# Patient Record
Sex: FEMALE | Race: WHITE | Employment: OTHER | URBAN - METROPOLITAN AREA
[De-identification: names, ages, dates, MRNs, and addresses within clinical notes are randomized per-mention and may not be internally consistent; named-entity substitution may affect disease eponyms.]

---

## 2017-01-27 PROBLEM — I77.9 BILATERAL CAROTID ARTERY DISEASE: Status: ACTIVE | Noted: 2017-01-27

## 2017-01-27 PROBLEM — I77.9 BILATERAL CAROTID ARTERY DISEASE (HCC): Status: ACTIVE | Noted: 2017-01-27

## 2017-04-03 ENCOUNTER — HOSPITAL ENCOUNTER (OUTPATIENT)
Dept: CARDIOLOGY CLINIC | Facility: HOSPITAL | Age: 77
Discharge: HOME | End: 2017-04-03
Attending: INTERNAL MEDICINE

## 2017-04-03 DIAGNOSIS — I65.23 BILATERAL CAROTID ARTERY STENOSIS: ICD-10-CM

## 2017-04-07 ENCOUNTER — HOSPITAL ENCOUNTER (OUTPATIENT)
Dept: GENERAL RADIOLOGY | Facility: HOSPITAL | Age: 77
Discharge: HOME | End: 2017-04-07
Attending: INTERNAL MEDICINE
Payer: MEDICARE

## 2017-04-07 DIAGNOSIS — I25.10 CAD (CORONARY ARTERY DISEASE): ICD-10-CM

## 2017-04-07 PROCEDURE — 71020 XR CHEST PA + LAT CHEST (CPT=71020): CPT

## 2017-04-10 RX ORDER — FLUOXETINE HYDROCHLORIDE 20 MG/1
20 CAPSULE ORAL DAILY
COMMUNITY
End: 2018-07-16

## 2017-04-10 RX ORDER — AZELASTINE HYDROCHLORIDE 0.5 MG/ML
1 SOLUTION/ DROPS OPHTHALMIC 2 TIMES DAILY PRN
Status: ON HOLD | COMMUNITY
End: 2017-09-26

## 2017-04-10 NOTE — H&P
Eduardodharmesh Wendi  : 1940  ACCOUNT:  [id number]  [phone]  PCP: Dr. Katelyn Mccann    TODAY'S DATE: 2017  DICTATED BY:  CRISSY Richmond]    CHIEF COMPLAINT: [elevated BP, Followup of .  CAD, of native vessels, Followup of Carotid artery st GI: denies melena, hematochezia. : no hematuria. INTEG: no new rashes, normal skin texture and color. MS: no limiting arthritis. NEURO: lightheaded, dizziness and w/out orthostasis. HEM/LYMPH: easy bruising. ALL: no new allergies.     PAST HISTORY: lioes adequate gait for exercise testing/testing. EXT: no clubbing or cyanosis. SKIN: ICD incision well healed. NEURO/PSYCH: alert, oriented to time, place and person and normal affect. CV: PALP: no lifts, thrills or rub and PMI not displaced.  CAROTIDS: car 75MG      ONE TABLET BY MOUTH ONE TIME DAILY  12/15/16 *Sotalol HCl          80MG      ONE TABLET BY MOUTH TWICE A DAY AS D  07/29/16 *Zetia                10MG      ONE TABLET BY MOUTH   NIGHTLY AT BED  04/28/16 *Lovastatin           40MG      2 TABLET BY

## 2017-04-11 ENCOUNTER — HOSPITAL ENCOUNTER (OUTPATIENT)
Dept: INTERVENTIONAL RADIOLOGY/VASCULAR | Facility: HOSPITAL | Age: 77
Discharge: HOME | End: 2017-04-12
Attending: INTERNAL MEDICINE | Admitting: INTERNAL MEDICINE
Payer: MEDICARE

## 2017-04-11 DIAGNOSIS — R93.1 ABNORMAL NUCLEAR CARDIAC IMAGING TEST: ICD-10-CM

## 2017-04-11 PROCEDURE — B3121ZZ FLUOROSCOPY OF LEFT SUBCLAVIAN ARTERY USING LOW OSMOLAR CONTRAST: ICD-10-PCS | Performed by: INTERNAL MEDICINE

## 2017-04-11 PROCEDURE — 027034Z DILATION OF CORONARY ARTERY, ONE ARTERY WITH DRUG-ELUTING INTRALUMINAL DEVICE, PERCUTANEOUS APPROACH: ICD-10-PCS | Performed by: INTERNAL MEDICINE

## 2017-04-11 PROCEDURE — B240ZZ3 ULTRASONOGRAPHY OF SINGLE CORONARY ARTERY, INTRAVASCULAR: ICD-10-PCS | Performed by: INTERNAL MEDICINE

## 2017-04-11 PROCEDURE — 3E033PZ INTRODUCTION OF PLATELET INHIBITOR INTO PERIPHERAL VEIN, PERCUTANEOUS APPROACH: ICD-10-PCS | Performed by: INTERNAL MEDICINE

## 2017-04-11 PROCEDURE — 85347 COAGULATION TIME ACTIVATED: CPT

## 2017-04-11 PROCEDURE — 93459 L HRT ART/GRFT ANGIO: CPT

## 2017-04-11 PROCEDURE — 4A023N7 MEASUREMENT OF CARDIAC SAMPLING AND PRESSURE, LEFT HEART, PERCUTANEOUS APPROACH: ICD-10-PCS | Performed by: INTERNAL MEDICINE

## 2017-04-11 PROCEDURE — 93010 ELECTROCARDIOGRAM REPORT: CPT | Performed by: INTERNAL MEDICINE

## 2017-04-11 PROCEDURE — 92978 ENDOLUMINL IVUS OCT C 1ST: CPT

## 2017-04-11 PROCEDURE — 93005 ELECTROCARDIOGRAM TRACING: CPT

## 2017-04-11 PROCEDURE — B2151ZZ FLUOROSCOPY OF LEFT HEART USING LOW OSMOLAR CONTRAST: ICD-10-PCS | Performed by: INTERNAL MEDICINE

## 2017-04-11 PROCEDURE — B2121ZZ FLUOROSCOPY OF SINGLE CORONARY ARTERY BYPASS GRAFT USING LOW OSMOLAR CONTRAST: ICD-10-PCS | Performed by: INTERNAL MEDICINE

## 2017-04-11 PROCEDURE — B2111ZZ FLUOROSCOPY OF MULTIPLE CORONARY ARTERIES USING LOW OSMOLAR CONTRAST: ICD-10-PCS | Performed by: INTERNAL MEDICINE

## 2017-04-11 RX ORDER — ASPIRIN 81 MG/1
324 TABLET, CHEWABLE ORAL ONCE
Status: DISCONTINUED | OUTPATIENT
Start: 2017-04-11 | End: 2017-04-11 | Stop reason: HOSPADM

## 2017-04-11 RX ORDER — HEPARIN SODIUM 5000 [USP'U]/ML
INJECTION, SOLUTION INTRAVENOUS; SUBCUTANEOUS
Status: COMPLETED
Start: 2017-04-11 | End: 2017-04-11

## 2017-04-11 RX ORDER — FLUOXETINE HYDROCHLORIDE 20 MG/1
20 CAPSULE ORAL DAILY
Status: DISCONTINUED | OUTPATIENT
Start: 2017-04-12 | End: 2017-04-12

## 2017-04-11 RX ORDER — SODIUM CHLORIDE 9 MG/ML
INJECTION, SOLUTION INTRAVENOUS CONTINUOUS
Status: ACTIVE | OUTPATIENT
Start: 2017-04-11 | End: 2017-04-11

## 2017-04-11 RX ORDER — KETOTIFEN FUMARATE 0.35 MG/ML
1 SOLUTION/ DROPS OPHTHALMIC 2 TIMES DAILY
Status: DISCONTINUED | OUTPATIENT
Start: 2017-04-11 | End: 2017-04-12

## 2017-04-11 RX ORDER — SOTALOL HYDROCHLORIDE 80 MG/1
80 TABLET ORAL
Status: DISCONTINUED | OUTPATIENT
Start: 2017-04-11 | End: 2017-04-12

## 2017-04-11 RX ORDER — SODIUM CHLORIDE 9 MG/ML
INJECTION, SOLUTION INTRAVENOUS CONTINUOUS
Status: DISCONTINUED | OUTPATIENT
Start: 2017-04-11 | End: 2017-04-12

## 2017-04-11 RX ORDER — LIDOCAINE HYDROCHLORIDE 10 MG/ML
INJECTION, SOLUTION INFILTRATION; PERINEURAL
Status: COMPLETED
Start: 2017-04-11 | End: 2017-04-11

## 2017-04-11 RX ORDER — CLOPIDOGREL BISULFATE 75 MG/1
75 TABLET ORAL DAILY
Status: DISCONTINUED | OUTPATIENT
Start: 2017-04-11 | End: 2017-04-11

## 2017-04-11 RX ORDER — MIDAZOLAM HYDROCHLORIDE 1 MG/ML
INJECTION INTRAMUSCULAR; INTRAVENOUS
Status: COMPLETED
Start: 2017-04-11 | End: 2017-04-11

## 2017-04-11 RX ORDER — ASPIRIN 81 MG/1
81 TABLET ORAL DAILY
Status: DISCONTINUED | OUTPATIENT
Start: 2017-04-12 | End: 2017-04-12

## 2017-04-11 RX ORDER — CLOPIDOGREL BISULFATE 75 MG/1
75 TABLET ORAL DAILY
Status: DISCONTINUED | OUTPATIENT
Start: 2017-04-12 | End: 2017-04-12

## 2017-04-11 RX ORDER — LOVASTATIN 40 MG/1
80 TABLET ORAL NIGHTLY
Status: DISCONTINUED | OUTPATIENT
Start: 2017-04-11 | End: 2017-04-12

## 2017-04-11 RX ADMIN — SODIUM CHLORIDE: 9 INJECTION, SOLUTION INTRAVENOUS at 16:45:00

## 2017-04-11 RX ADMIN — SODIUM CHLORIDE: 9 INJECTION, SOLUTION INTRAVENOUS at 14:30:00

## 2017-04-11 RX ADMIN — SOTALOL HYDROCHLORIDE 80 MG: 80 TABLET ORAL at 22:32:00

## 2017-04-11 RX ADMIN — LOVASTATIN 80 MG: 40 TABLET ORAL at 22:33:00

## 2017-04-11 RX ADMIN — KETOTIFEN FUMARATE 1 DROP: 0.35 SOLUTION/ DROPS OPHTHALMIC at 21:00:00

## 2017-04-11 NOTE — H&P
History & Physical Examination    Patient Name: Darline Han  MRN: [id number]  CSN: [id number]  YOB: 1940    Diagnosis: CAD,s/p CABG, recurrent chest pain    Present Illness: Cath      Prescriptions prior to admission:  FLUoxetine HCl 20 diet. Check blood sugars three times a week, alternating am and 2 hours post prandial and prn. Disp: 50 each Rfl: 12 Unknown at Unknown time   LANCETS MICRO THIN 33G Does not apply Misc 1 Device by Does not apply route daily.  Disp: 50 each Rfl: 0 Unknown a Comment Medtronic ICD, intermittently dependent    CABG  1996    Comment lima lad/svg to om svg to rca    ANGIOGRAM      ANGIOPLASTY (CORONARY)      CATH PERCUTANEOUS  TRANSLUMINAL CORONARY ANGIOPLASTY      Comment x2 stents Feb/2016    ARTHROSCOPY OF

## 2017-04-11 NOTE — PROCEDURES
[facility]  PCI Procedure Note    Jesus Alvarado Location: Cath Lab    CSN [id number] MRN [id number]   Admission Date 4/11/2017 Procedure Date 4/11/2017   Attending Physician Yusuf Castillo MD Procedure Physician Anna Jackson MD     Pre-Proced artery. The lesion was crossed with a 0.014 run through. A series of 3.5 mm balloons were used to try to expand the previously stented segment. The lesion appeared to be quite fibrotic. A 3.5 x 26 mm resolute drug-eluting stent was deployed.   It was po described above  2. Three-vessel coronary disease  3. Patent stents in the right coronary artery  4. Patent LIMA to the LAD  5. Patent left subclavian artery stent  6.   Severe in-stent restenosis in the saphenous vein graft to the obtuse marginal arter

## 2017-04-11 NOTE — PLAN OF CARE
NURSING ADMISSION NOTE      Pt admit from cath lab w/ daughter at bedside. Pt A&O, denies pain at present. R groin w/ sheath in place- ACT to be checked @ 1830. Pt updated on POC. Will monitor. 1830: Act 224- to be re-checked @ 1930.

## 2017-04-12 VITALS
BODY MASS INDEX: 31.15 KG/M2 | WEIGHT: 165 LBS | OXYGEN SATURATION: 98 % | SYSTOLIC BLOOD PRESSURE: 136 MMHG | HEART RATE: 72 BPM | RESPIRATION RATE: 20 BRPM | TEMPERATURE: 98 F | DIASTOLIC BLOOD PRESSURE: 50 MMHG | HEIGHT: 61 IN

## 2017-04-12 PROCEDURE — 80048 BASIC METABOLIC PNL TOTAL CA: CPT | Performed by: INTERNAL MEDICINE

## 2017-04-12 PROCEDURE — 85027 COMPLETE CBC AUTOMATED: CPT | Performed by: INTERNAL MEDICINE

## 2017-04-12 PROCEDURE — 99211 OFF/OP EST MAY X REQ PHY/QHP: CPT

## 2017-04-12 RX ADMIN — ASPIRIN 81 MG: 81 TABLET ORAL at 09:37:00

## 2017-04-12 RX ADMIN — FLUOXETINE HYDROCHLORIDE 20 MG: 20 CAPSULE ORAL at 09:37:00

## 2017-04-12 RX ADMIN — SOTALOL HYDROCHLORIDE 80 MG: 80 TABLET ORAL at 05:47:00

## 2017-04-12 RX ADMIN — KETOTIFEN FUMARATE 1 DROP: 0.35 SOLUTION/ DROPS OPHTHALMIC at 09:37:00

## 2017-04-12 RX ADMIN — CLOPIDOGREL BISULFATE 75 MG: 75 TABLET ORAL at 09:37:00

## 2017-04-12 NOTE — PROGRESS NOTES
[facility]  Cardiology Progress Note    Rock Cool Patient Status:  Outpatient in a Bed    3/11/1940 MRN [id number]   [facility] 8NE-A Attending Rory Butts MD   Hosp Day # 1 PCP Rafaela Cordero MD     Subjective:  No being seen by Dr. Raymond Mahan.      CRISSY Gao  4/12/2017  10:04 AM  Pt seen and examined, OK for d/c  Maxine Escalante

## 2017-04-12 NOTE — PLAN OF CARE
Patient is alert and oriented. Saturates well on room, A-paced on the monitor. Denies any pain or SOB. Sheath pulled at 2102, manual pressure held for 25 mins. Quick clot was applied with a Tegaderm. Pedal pulses with doppler only.  Dressing is C/D/I, sit

## 2017-04-12 NOTE — DIETARY NOTE
Consult received per cardiac rehab standing order. Patient to be educated by Cardiac Rehab staff and encouraged to attend outpatient class, taught by RD. RD available for questions PRN.     Bernice Lutz, [address], [place], [address] [facility]  Pager [phone] [place] [phone]

## 2017-05-22 ENCOUNTER — LAB ENCOUNTER (OUTPATIENT)
Dept: LAB | Facility: HOSPITAL | Age: 77
End: 2017-05-22
Attending: INTERNAL MEDICINE
Payer: MEDICARE

## 2017-05-22 DIAGNOSIS — I25.10 CORONARY ATHEROSCLEROSIS OF NATIVE CORONARY ARTERY: Primary | ICD-10-CM

## 2017-05-22 PROCEDURE — 80048 BASIC METABOLIC PNL TOTAL CA: CPT

## 2017-05-22 PROCEDURE — 36415 COLL VENOUS BLD VENIPUNCTURE: CPT

## 2017-09-25 ENCOUNTER — HOSPITAL ENCOUNTER (OUTPATIENT)
Dept: GENERAL RADIOLOGY | Facility: HOSPITAL | Age: 77
Discharge: HOME | End: 2017-09-25
Attending: INTERNAL MEDICINE
Payer: MEDICARE

## 2017-09-25 ENCOUNTER — LAB ENCOUNTER (OUTPATIENT)
Dept: LAB | Facility: HOSPITAL | Age: 77
End: 2017-09-25
Attending: INTERNAL MEDICINE
Payer: MEDICARE

## 2017-09-25 DIAGNOSIS — R07.9 CHEST PAIN: ICD-10-CM

## 2017-09-25 DIAGNOSIS — I25.10 CORONARY ATHEROSCLEROSIS OF NATIVE CORONARY ARTERY: Primary | ICD-10-CM

## 2017-09-25 DIAGNOSIS — I25.10 CORONARY ATHEROSCLEROSIS OF NATIVE CORONARY ARTERY: ICD-10-CM

## 2017-09-25 LAB
ALBUMIN SERPL-MCNC: 3.8 G/DL (ref 3.5–4.8)
ALP LIVER SERPL-CCNC: 71 U/L (ref 55–142)
ALT SERPL-CCNC: 29 U/L (ref 14–54)
AST SERPL-CCNC: 25 U/L (ref 15–41)
BASOPHILS # BLD AUTO: 0.05 X10(3) UL (ref 0–0.1)
BASOPHILS NFR BLD AUTO: 1.1 %
BILIRUB SERPL-MCNC: 1 MG/DL (ref 0.1–2)
BUN BLD-MCNC: 21 MG/DL (ref 8–20)
CALCIUM BLD-MCNC: 9.7 MG/DL (ref 8.3–10.3)
CHLORIDE: 105 MMOL/L (ref 101–111)
CO2: 28 MMOL/L (ref 22–32)
CREAT BLD-MCNC: 0.93 MG/DL (ref 0.55–1.02)
EOSINOPHIL # BLD AUTO: 0.13 X10(3) UL (ref 0–0.3)
EOSINOPHIL NFR BLD AUTO: 2.9 %
ERYTHROCYTE [DISTWIDTH] IN BLOOD BY AUTOMATED COUNT: 12.5 % (ref 11.5–16)
GLUCOSE BLD-MCNC: 101 MG/DL (ref 70–99)
HCT VFR BLD AUTO: 43.2 % (ref 34–50)
HGB BLD-MCNC: 15 G/DL (ref 12–16)
IMMATURE GRANULOCYTE COUNT: 0.02 X10(3) UL (ref 0–1)
IMMATURE GRANULOCYTE RATIO %: 0.4 %
LYMPHOCYTES # BLD AUTO: 1.02 X10(3) UL (ref 0.9–4)
LYMPHOCYTES NFR BLD AUTO: 22.5 %
M PROTEIN MFR SERPL ELPH: 7.5 G/DL (ref 6.1–8.3)
MCH RBC QN AUTO: 30.6 PG (ref 27–33.2)
MCHC RBC AUTO-ENTMCNC: 34.7 G/DL (ref 31–37)
MCV RBC AUTO: 88.2 FL (ref 81–100)
MONOCYTES # BLD AUTO: 0.42 X10(3) UL (ref 0.1–0.6)
MONOCYTES NFR BLD AUTO: 9.3 %
NEUTROPHIL ABS PRELIM: 2.9 X10 (3) UL (ref 1.3–6.7)
NEUTROPHILS # BLD AUTO: 2.9 X10(3) UL (ref 1.3–6.7)
NEUTROPHILS NFR BLD AUTO: 63.8 %
PLATELET # BLD AUTO: 141 10(3)UL (ref 150–450)
POTASSIUM SERPL-SCNC: 4.3 MMOL/L (ref 3.6–5.1)
RBC # BLD AUTO: 4.9 X10(6)UL (ref 3.8–5.1)
RED CELL DISTRIBUTION WIDTH-SD: 40.2 FL (ref 35.1–46.3)
SODIUM SERPL-SCNC: 140 MMOL/L (ref 136–144)
WBC # BLD AUTO: 4.5 X10(3) UL (ref 4–13)

## 2017-09-25 PROCEDURE — 36415 COLL VENOUS BLD VENIPUNCTURE: CPT

## 2017-09-25 PROCEDURE — 71020 XR CHEST PA + LAT CHEST (CPT=71020): CPT | Performed by: INTERNAL MEDICINE

## 2017-09-25 PROCEDURE — 85025 COMPLETE CBC W/AUTO DIFF WBC: CPT

## 2017-09-25 PROCEDURE — 80053 COMPREHEN METABOLIC PANEL: CPT

## 2017-09-25 NOTE — HISTORICAL OFFICE NOTE
Goran Adame  : 1940  ACCOUNT:  [id number]  [phone]  PCP: Dr. Katelyn Mccann     TODAY'S DATE: 2017  DICTATED BY:  Lara Pitts M.D. ]    CHIEF COMPLAINT: [Followup of .  CAD, of native vessels.]    HPI:    [On 2017, Richard Olvera severe native three vessel disease, patent grafts to the LAD and OM and occluded graft to the RCA    FAMILY HISTORY: Significant for premature CAD. Negative for AAA. SOCIAL HISTORY: SMOKING: Never used tobacco. Denies smoking. CAFFEINE: none.  ALCOHOL: den pacemaker dependent  9. Mixed Hyperlipidemia  10. Old Myocardial Infarction  11. Renal insufficiency, chronic  12. Subclavian Stenosis    PLAN:    [1. Restart ACE inhibitors. 2. We will increase beta-blockers. 3. Reestablish exercise pattern.    4. Revi

## 2017-09-25 NOTE — HISTORICAL OFFICE NOTE
Anshu Garcia  : 1940  ACCOUNT:  [id number]  [phone]  PCP: Dr. Chelsea Fitzpatrick     TODAY'S DATE: 2017  DICTATED BY:  CRISSY Negrete]    CHIEF COMPLAINT: [Followup of .  CAD, of native vessels.]    HPI:    [On 2017, Nura Lima An bruising. ALL: no new allergies.       PAST HISTORY: depression, hepatitis C, knee surgery, carpal tunnel surgery, T and ovarian cyst and tubular pregnancy    PAST CV HISTORY: aortic insufficiency, CABG 1996, cardiac catheterization April 2010, carotid liliane thrills or rub and PMI not displaced. CAROTIDS: carotid endarterectomy scar right. ABD AORTA: difficult to assess abd aorta. FEM: no bruit on right. PEDAL: pedal pulses intact. EXT: trace edema bilaterally to mid-calf.      LABORATORY DATA: [No recent testi chronic  13. Subclavin Stenosis    Plan: [1.  Continue on current medications. 2.  Hold on exercise. 3.  Nonfasting labs and chest x-ray today. 4.  Contact the office with any new or worsening symptoms.   5.  Cardiac catheterization in a.m. with Dr. Lane Silva

## 2017-09-26 ENCOUNTER — HOSPITAL ENCOUNTER (OUTPATIENT)
Dept: INTERVENTIONAL RADIOLOGY/VASCULAR | Facility: HOSPITAL | Age: 77
Discharge: HOME | End: 2017-09-27
Attending: INTERNAL MEDICINE | Admitting: INTERNAL MEDICINE
Payer: MEDICARE

## 2017-09-26 DIAGNOSIS — I20.8 ANGINA EFFORT: ICD-10-CM

## 2017-09-26 DIAGNOSIS — I25.10 CAD (CORONARY ARTERY DISEASE): ICD-10-CM

## 2017-09-26 LAB
ATRIAL RATE: 70 BPM
ISTAT ACTIVATED CLOTTING TIME: 423 SECONDS (ref 74–137)
P AXIS: 18 DEGREES
P-R INTERVAL: 216 MS
Q-T INTERVAL: 432 MS
QRS DURATION: 106 MS
QTC CALCULATION (BEZET): 466 MS
R AXIS: 52 DEGREES
T AXIS: 117 DEGREES
VENTRICULAR RATE: 70 BPM

## 2017-09-26 PROCEDURE — 93005 ELECTROCARDIOGRAM TRACING: CPT

## 2017-09-26 PROCEDURE — 99153 MOD SED SAME PHYS/QHP EA: CPT

## 2017-09-26 PROCEDURE — 93010 ELECTROCARDIOGRAM REPORT: CPT | Performed by: INTERNAL MEDICINE

## 2017-09-26 PROCEDURE — B2121ZZ FLUOROSCOPY OF SINGLE CORONARY ARTERY BYPASS GRAFT USING LOW OSMOLAR CONTRAST: ICD-10-PCS | Performed by: INTERNAL MEDICINE

## 2017-09-26 PROCEDURE — 85347 COAGULATION TIME ACTIVATED: CPT

## 2017-09-26 PROCEDURE — 99152 MOD SED SAME PHYS/QHP 5/>YRS: CPT

## 2017-09-26 PROCEDURE — 027034Z DILATION OF CORONARY ARTERY, ONE ARTERY WITH DRUG-ELUTING INTRALUMINAL DEVICE, PERCUTANEOUS APPROACH: ICD-10-PCS | Performed by: INTERNAL MEDICINE

## 2017-09-26 PROCEDURE — 93455 CORONARY ART/GRFT ANGIO S&I: CPT

## 2017-09-26 RX ORDER — EZETIMIBE 10 MG/1
10 TABLET ORAL NIGHTLY
Status: DISCONTINUED | OUTPATIENT
Start: 2017-09-26 | End: 2017-09-27

## 2017-09-26 RX ORDER — LOVASTATIN 40 MG/1
80 TABLET ORAL NIGHTLY
Status: DISCONTINUED | OUTPATIENT
Start: 2017-09-26 | End: 2017-09-27

## 2017-09-26 RX ORDER — ASPIRIN 81 MG/1
81 TABLET, CHEWABLE ORAL DAILY
Status: DISCONTINUED | OUTPATIENT
Start: 2017-09-26 | End: 2017-09-27

## 2017-09-26 RX ORDER — CLOPIDOGREL BISULFATE 75 MG/1
75 TABLET ORAL DAILY
Status: DISCONTINUED | OUTPATIENT
Start: 2017-09-27 | End: 2017-09-27

## 2017-09-26 RX ORDER — CLOPIDOGREL BISULFATE 75 MG/1
75 TABLET ORAL DAILY
Status: DISCONTINUED | OUTPATIENT
Start: 2017-09-26 | End: 2017-09-27

## 2017-09-26 RX ORDER — SODIUM CHLORIDE 9 MG/ML
INJECTION, SOLUTION INTRAVENOUS CONTINUOUS
Status: DISCONTINUED | OUTPATIENT
Start: 2017-09-26 | End: 2017-09-27

## 2017-09-26 RX ORDER — FLUOXETINE HYDROCHLORIDE 20 MG/1
20 CAPSULE ORAL DAILY
Status: DISCONTINUED | OUTPATIENT
Start: 2017-09-26 | End: 2017-09-27

## 2017-09-26 RX ORDER — MIDAZOLAM HYDROCHLORIDE 1 MG/ML
INJECTION INTRAMUSCULAR; INTRAVENOUS
Status: COMPLETED
Start: 2017-09-26 | End: 2017-09-26

## 2017-09-26 RX ORDER — LIDOCAINE HYDROCHLORIDE 10 MG/ML
INJECTION, SOLUTION INFILTRATION; PERINEURAL
Status: COMPLETED
Start: 2017-09-26 | End: 2017-09-26

## 2017-09-26 RX ORDER — SODIUM CHLORIDE 9 MG/ML
INJECTION, SOLUTION INTRAVENOUS CONTINUOUS
Status: ACTIVE | OUTPATIENT
Start: 2017-09-26 | End: 2017-09-26

## 2017-09-26 RX ORDER — LISINOPRIL 40 MG/1
20 TABLET ORAL DAILY
Status: DISCONTINUED | OUTPATIENT
Start: 2017-09-26 | End: 2017-09-27

## 2017-09-26 RX ORDER — HEPARIN SODIUM 5000 [USP'U]/ML
INJECTION, SOLUTION INTRAVENOUS; SUBCUTANEOUS
Status: COMPLETED
Start: 2017-09-26 | End: 2017-09-26

## 2017-09-26 RX ORDER — CHLORAL HYDRATE 500 MG
1000 CAPSULE ORAL DAILY
Status: DISCONTINUED | OUTPATIENT
Start: 2017-09-26 | End: 2017-09-26 | Stop reason: RX

## 2017-09-26 RX ORDER — SOTALOL HYDROCHLORIDE 80 MG/1
80 TABLET ORAL 2 TIMES DAILY
Status: DISCONTINUED | OUTPATIENT
Start: 2017-09-26 | End: 2017-09-27

## 2017-09-26 RX ADMIN — EZETIMIBE 10 MG: 10 TABLET ORAL at 20:38:00

## 2017-09-26 RX ADMIN — SODIUM CHLORIDE: 9 INJECTION, SOLUTION INTRAVENOUS at 07:30:00

## 2017-09-26 RX ADMIN — SODIUM CHLORIDE: 9 INJECTION, SOLUTION INTRAVENOUS at 09:30:00

## 2017-09-26 RX ADMIN — SODIUM CHLORIDE: 9 INJECTION, SOLUTION INTRAVENOUS at 11:42:00

## 2017-09-26 RX ADMIN — SOTALOL HYDROCHLORIDE 80 MG: 80 TABLET ORAL at 20:38:00

## 2017-09-26 NOTE — H&P
History & Physical Examination    Patient Name: Eliana Carvajal  MRN: [id number]  CSN: [id number]  YOB: 1940    Diagnosis: CAD    Present Illness: Cath      Prescriptions Prior to Admission:  lisinopril 20 MG Oral Tab Take 20 mg by mouth mavis each Rfl: 3 Taking       Current Facility-Administered Medications:  0.9%  NaCl infusion  Intravenous Continuous       Allergies:   Statins                 Myalgia, Other (See Comments)    Comment:Elevated LFT's,muscle weakness    Past Medical History:   D KNEE AND CARPAL TUNNEL SURGERY  4/2016: OTHER SURGICAL HISTORY Right      Comment: right carotid endarterectomy  No date: TONSILLECTOMY  Family History   Problem Relation Age of Onset   • Heart Disorder Father    • Diabetes Father    • Heart Disorder Judy

## 2017-09-26 NOTE — PROGRESS NOTES
Received report from Cath. Nurse. Pt. Lying supine in bed. Femoral, radial, and pedal pulses all present. Bp and site assessment every 15 minutes. Site assessed: no crepitus, oozing, pain, or hematoma noted at the site. Pt. Is compliant.

## 2017-09-26 NOTE — DIETARY NOTE
Nutrition Short Note    Dietitian consult received per cardiac rehab/CHF protocol. Pt to be educated by cardiac rehab staff and encouraged to attend outpatient classes taught by RD. RD available PRN.     Jose Alejandro Garcia MS, RD, LDN

## 2017-09-26 NOTE — PROGRESS NOTES
Pharmacy medication note  The following order has been discontinued per Clinical policy 557 (non-FDA approved supplements):Fish oil    Thank you.   Ean Ratliff, PharmD  9/26/17  1250

## 2017-09-26 NOTE — PROCEDURES
[facility]  PCI Procedure Note    Ashley Cummins Location: Cath Lab    CSN [id number] MRN [id number]   Admission Date 9/26/2017 Procedure Date 9/26/2017   Attending Physician Dari Henry MD Procedure Physician Delores Laura MD     Pre-Proced oxygen, heart rate and blood pressure by nurse and myself during the exam from 8:06 AM to 8:50 AM.      Impression:  1. Angiography of the right coronary arteries is remarkable for this being a right dominant system.   There is 30-40% in-stent restenosis i

## 2017-09-27 VITALS
TEMPERATURE: 98 F | HEIGHT: 61 IN | WEIGHT: 160 LBS | HEART RATE: 71 BPM | DIASTOLIC BLOOD PRESSURE: 70 MMHG | OXYGEN SATURATION: 95 % | SYSTOLIC BLOOD PRESSURE: 137 MMHG | BODY MASS INDEX: 30.21 KG/M2 | RESPIRATION RATE: 18 BRPM

## 2017-09-27 LAB
ATRIAL RATE: 70 BPM
BUN BLD-MCNC: 21 MG/DL (ref 8–20)
CALCIUM BLD-MCNC: 9.1 MG/DL (ref 8.3–10.3)
CHLORIDE: 106 MMOL/L (ref 101–111)
CO2: 29 MMOL/L (ref 22–32)
CREAT BLD-MCNC: 0.86 MG/DL (ref 0.55–1.02)
ERYTHROCYTE [DISTWIDTH] IN BLOOD BY AUTOMATED COUNT: 12.2 % (ref 11.5–16)
GLUCOSE BLD-MCNC: 89 MG/DL (ref 70–99)
HCT VFR BLD AUTO: 40.1 % (ref 34–50)
HGB BLD-MCNC: 13.6 G/DL (ref 12–16)
MCH RBC QN AUTO: 30.3 PG (ref 27–33.2)
MCHC RBC AUTO-ENTMCNC: 33.9 G/DL (ref 31–37)
MCV RBC AUTO: 89.3 FL (ref 81–100)
P AXIS: 16 DEGREES
P-R INTERVAL: 244 MS
PLATELET # BLD AUTO: 123 10(3)UL (ref 150–450)
POTASSIUM SERPL-SCNC: 4.3 MMOL/L (ref 3.6–5.1)
Q-T INTERVAL: 436 MS
QRS DURATION: 108 MS
QTC CALCULATION (BEZET): 470 MS
R AXIS: 37 DEGREES
RBC # BLD AUTO: 4.49 X10(6)UL (ref 3.8–5.1)
RED CELL DISTRIBUTION WIDTH-SD: 40.2 FL (ref 35.1–46.3)
SODIUM SERPL-SCNC: 143 MMOL/L (ref 136–144)
T AXIS: 166 DEGREES
VENTRICULAR RATE: 70 BPM
WBC # BLD AUTO: 5.5 X10(3) UL (ref 4–13)

## 2017-09-27 PROCEDURE — 99211 OFF/OP EST MAY X REQ PHY/QHP: CPT

## 2017-09-27 PROCEDURE — 85027 COMPLETE CBC AUTOMATED: CPT | Performed by: INTERNAL MEDICINE

## 2017-09-27 PROCEDURE — 80048 BASIC METABOLIC PNL TOTAL CA: CPT | Performed by: INTERNAL MEDICINE

## 2017-09-27 RX ADMIN — SOTALOL HYDROCHLORIDE 80 MG: 80 TABLET ORAL at 08:38:00

## 2017-09-27 RX ADMIN — FLUOXETINE HYDROCHLORIDE 20 MG: 20 CAPSULE ORAL at 08:38:00

## 2017-09-27 RX ADMIN — CLOPIDOGREL BISULFATE 75 MG: 75 TABLET ORAL at 08:37:00

## 2017-09-27 RX ADMIN — LISINOPRIL 20 MG: 40 TABLET ORAL at 08:38:00

## 2017-09-27 RX ADMIN — ASPIRIN 81 MG: 81 TABLET, CHEWABLE ORAL at 08:38:00

## 2017-09-27 NOTE — PROGRESS NOTES
[facility]  Cardiology Progress Note    Subjective:  No chest pain or shortness of breath. Ambulating in auguste and room without groin pain. Right groin mildly ecchymotic but soft without hematoma or bruit.     Objective:  /52 (BP Location: Left

## 2017-09-27 NOTE — PLAN OF CARE
CARDIOVASCULAR - ADULT    • Maintains optimal cardiac output and hemodynamic stability Progressing    • Absence of cardiac arrhythmias or at baseline Progressing          Pt s/p MEHDI to OM of SVG. Pt axox4. Denies pain or sob. A-paced on tele.  Right groin w

## 2017-10-23 ENCOUNTER — LAB ENCOUNTER (OUTPATIENT)
Dept: LAB | Facility: HOSPITAL | Age: 77
End: 2017-10-23
Attending: INTERNAL MEDICINE
Payer: MEDICARE

## 2017-10-23 DIAGNOSIS — Z01.818 PRE-OP TESTING: Primary | ICD-10-CM

## 2017-10-23 PROCEDURE — 36415 COLL VENOUS BLD VENIPUNCTURE: CPT

## 2017-10-23 PROCEDURE — 80053 COMPREHEN METABOLIC PANEL: CPT

## 2017-10-23 PROCEDURE — 85025 COMPLETE CBC W/AUTO DIFF WBC: CPT

## 2017-10-24 NOTE — HISTORICAL OFFICE NOTE
Macarena Oumar  : 1940  ACCOUNT:  [id number]  [phone]  PCP: Dr. Iona Reynoso     TODAY'S DATE: 10/11/2017  DICTATED BY:  Arnulfo WOODWARD]    CHIEF COMPLAINT: [f/u hosp.d/c.]    HPI:    [On 10/11/2017, Jalen Elliott, a 68year old female, p HISTORY: 9/26/2017  PTCA/MEHDI (Prom[facility]) SVGt to CX secondary to in-stent restenosis. , aortic insufficiency, CABG 1996, cardiac catheterization April 2010, carotid artery stenosis, cath 1996, cath 1997, normal left ventricular filling pressures, mild left lawrence displaced. CAROTIDS: carotid endarterectomy scar right. ABD AORTA: difficult to assess abd aorta. FEM: no bruit on right. PEDAL: pedal pulses intact. EXT: no peripheral edema.      DECISION MAKING: Coronary artery disease, status post PTCA and drug-eluting 04/28/17 *Lovastatin           40MG      2 TABLET BY MOUTH NIGHTLY AT BEDTIME     12/15/16 *Plavix               75MG      ONE TABLET BY MOUTH ONE TIME DAILY       12/15/16 *Sotalol HCl          80MG      ONE TABLET BY MOUTH TWICE A DAY AS D

## 2017-10-24 NOTE — HISTORICAL OFFICE NOTE
Flavia Cho  : 1940  ACCOUNT:  [id number]  [phone]  PCP: Dr. Trini Locke     TODAY'S DATE: 2017  DICTATED BY:  Colt Shelton M.D. ]    CHIEF COMPLAINT: [Followup of .  CAD, of native vessels.]    HPI:    [On 2017, Joel Ball severe native three vessel disease, patent grafts to the LAD and OM and occluded graft to the RCA    FAMILY HISTORY: Significant for premature CAD. Negative for AAA. SOCIAL HISTORY: SMOKING: Never used tobacco. Denies smoking. CAFFEINE: none.  ALCOHOL: den pacemaker dependent  9. Mixed Hyperlipidemia  10. Old Myocardial Infarction  11. Renal insufficiency, chronic  12. Subclavian Stenosis    PLAN:    [1. Restart ACE inhibitors. 2. We will increase beta-blockers. 3. Reestablish exercise pattern.    4. Revi

## 2017-10-31 ENCOUNTER — HOSPITAL ENCOUNTER (INPATIENT)
Dept: INTERVENTIONAL RADIOLOGY/VASCULAR | Facility: HOSPITAL | Age: 77
LOS: 2 days | Discharge: HOME | DRG: 247 | End: 2017-11-02
Attending: INTERNAL MEDICINE | Admitting: INTERNAL MEDICINE
Payer: MEDICARE

## 2017-10-31 ENCOUNTER — APPOINTMENT (OUTPATIENT)
Dept: CV DIAGNOSTICS | Facility: HOSPITAL | Age: 77
DRG: 247 | End: 2017-10-31
Attending: INTERNAL MEDICINE
Payer: MEDICARE

## 2017-10-31 DIAGNOSIS — I25.82 CHRONIC TOTAL OCCLUSION OF CORONARY ARTERY: ICD-10-CM

## 2017-10-31 PROCEDURE — 85347 COAGULATION TIME ACTIVATED: CPT

## 2017-10-31 PROCEDURE — 87081 CULTURE SCREEN ONLY: CPT | Performed by: INTERNAL MEDICINE

## 2017-10-31 PROCEDURE — B41FYZZ FLUOROSCOPY OF RIGHT LOWER EXTREMITY ARTERIES USING OTHER CONTRAST: ICD-10-PCS | Performed by: INTERNAL MEDICINE

## 2017-10-31 PROCEDURE — 02713Z6 DILATION OF CORONARY ARTERY, TWO ARTERIES, BIFURCATION, PERCUTANEOUS APPROACH: ICD-10-PCS | Performed by: INTERNAL MEDICINE

## 2017-10-31 PROCEDURE — 93307 TTE W/O DOPPLER COMPLETE: CPT | Performed by: INTERNAL MEDICINE

## 2017-10-31 PROCEDURE — 4A033BC MEASUREMENT OF ARTERIAL PRESSURE, CORONARY, PERCUTANEOUS APPROACH: ICD-10-PCS | Performed by: INTERNAL MEDICINE

## 2017-10-31 PROCEDURE — 93010 ELECTROCARDIOGRAM REPORT: CPT | Performed by: INTERNAL MEDICINE

## 2017-10-31 PROCEDURE — B240ZZ3 ULTRASONOGRAPHY OF SINGLE CORONARY ARTERY, INTRAVASCULAR: ICD-10-PCS | Performed by: INTERNAL MEDICINE

## 2017-10-31 PROCEDURE — 027135Z DILATION OF CORONARY ARTERY, TWO ARTERIES WITH TWO DRUG-ELUTING INTRALUMINAL DEVICES, PERCUTANEOUS APPROACH: ICD-10-PCS | Performed by: INTERNAL MEDICINE

## 2017-10-31 PROCEDURE — B211YZZ FLUOROSCOPY OF MULTIPLE CORONARY ARTERIES USING OTHER CONTRAST: ICD-10-PCS | Performed by: INTERNAL MEDICINE

## 2017-10-31 PROCEDURE — B41GYZZ FLUOROSCOPY OF LEFT LOWER EXTREMITY ARTERIES USING OTHER CONTRAST: ICD-10-PCS | Performed by: INTERNAL MEDICINE

## 2017-10-31 PROCEDURE — 86901 BLOOD TYPING SEROLOGIC RH(D): CPT | Performed by: INTERNAL MEDICINE

## 2017-10-31 PROCEDURE — 92978 ENDOLUMINL IVUS OCT C 1ST: CPT

## 2017-10-31 PROCEDURE — 86850 RBC ANTIBODY SCREEN: CPT | Performed by: INTERNAL MEDICINE

## 2017-10-31 PROCEDURE — 93005 ELECTROCARDIOGRAM TRACING: CPT

## 2017-10-31 PROCEDURE — 86900 BLOOD TYPING SEROLOGIC ABO: CPT | Performed by: INTERNAL MEDICINE

## 2017-10-31 PROCEDURE — 93571 IV DOP VEL&/PRESS C FLO 1ST: CPT

## 2017-10-31 RX ORDER — NOREPINEPHRINE BITARTRATE 1 MG/ML
INJECTION, SOLUTION INTRAVENOUS
Status: COMPLETED
Start: 2017-10-31 | End: 2017-10-31

## 2017-10-31 RX ORDER — LIDOCAINE HYDROCHLORIDE 10 MG/ML
INJECTION, SOLUTION INFILTRATION; PERINEURAL
Status: COMPLETED
Start: 2017-10-31 | End: 2017-10-31

## 2017-10-31 RX ORDER — ASPIRIN 81 MG/1
81 TABLET ORAL DAILY
Status: DISCONTINUED | OUTPATIENT
Start: 2017-11-01 | End: 2017-11-02

## 2017-10-31 RX ORDER — CLOPIDOGREL BISULFATE 75 MG/1
75 TABLET ORAL DAILY
Status: DISCONTINUED | OUTPATIENT
Start: 2017-11-01 | End: 2017-11-02

## 2017-10-31 RX ORDER — SODIUM CHLORIDE 9 MG/ML
INJECTION, SOLUTION INTRAVENOUS CONTINUOUS
Status: DISCONTINUED | OUTPATIENT
Start: 2017-10-31 | End: 2017-11-02

## 2017-10-31 RX ORDER — MIDAZOLAM HYDROCHLORIDE 1 MG/ML
1 INJECTION INTRAMUSCULAR; INTRAVENOUS ONCE
Status: COMPLETED | OUTPATIENT
Start: 2017-10-31 | End: 2017-10-31

## 2017-10-31 RX ORDER — FLUOXETINE HYDROCHLORIDE 20 MG/1
20 CAPSULE ORAL DAILY
Status: DISCONTINUED | OUTPATIENT
Start: 2017-11-01 | End: 2017-11-02

## 2017-10-31 RX ORDER — DIPHENHYDRAMINE HYDROCHLORIDE 50 MG/ML
INJECTION INTRAMUSCULAR; INTRAVENOUS
Status: COMPLETED
Start: 2017-10-31 | End: 2017-10-31

## 2017-10-31 RX ORDER — EZETIMIBE 10 MG/1
10 TABLET ORAL NIGHTLY
Status: DISCONTINUED | OUTPATIENT
Start: 2017-10-31 | End: 2017-11-02

## 2017-10-31 RX ORDER — LOVASTATIN 40 MG/1
80 TABLET ORAL NIGHTLY
Status: DISCONTINUED | OUTPATIENT
Start: 2017-10-31 | End: 2017-11-02

## 2017-10-31 RX ORDER — SOTALOL HYDROCHLORIDE 80 MG/1
80 TABLET ORAL 2 TIMES DAILY
Status: DISCONTINUED | OUTPATIENT
Start: 2017-10-31 | End: 2017-11-02

## 2017-10-31 RX ORDER — NITROGLYCERIN 20 MG/100ML
INJECTION INTRAVENOUS
Status: DISCONTINUED
Start: 2017-10-31 | End: 2017-10-31 | Stop reason: WASHOUT

## 2017-10-31 RX ORDER — MAGNESIUM OXIDE 400 MG (241.3 MG MAGNESIUM) TABLET
100 TABLET DAILY
Status: DISCONTINUED | OUTPATIENT
Start: 2017-11-01 | End: 2017-11-02

## 2017-10-31 RX ORDER — LISINOPRIL 20 MG/1
20 TABLET ORAL DAILY
Status: DISCONTINUED | OUTPATIENT
Start: 2017-11-01 | End: 2017-11-02

## 2017-10-31 RX ORDER — LOVASTATIN 40 MG/1
80 TABLET ORAL NIGHTLY
Status: DISCONTINUED | OUTPATIENT
Start: 2017-10-31 | End: 2017-10-31

## 2017-10-31 RX ORDER — SODIUM CHLORIDE 9 MG/ML
INJECTION, SOLUTION INTRAVENOUS CONTINUOUS
Status: ACTIVE | OUTPATIENT
Start: 2017-10-31 | End: 2017-10-31

## 2017-10-31 RX ORDER — MIDAZOLAM HYDROCHLORIDE 1 MG/ML
INJECTION INTRAMUSCULAR; INTRAVENOUS
Status: COMPLETED
Start: 2017-10-31 | End: 2017-10-31

## 2017-10-31 RX ORDER — HEPARIN SODIUM 5000 [USP'U]/ML
INJECTION, SOLUTION INTRAVENOUS; SUBCUTANEOUS
Status: COMPLETED
Start: 2017-10-31 | End: 2017-10-31

## 2017-10-31 RX ADMIN — LOVASTATIN 80 MG: 40 TABLET ORAL at 20:02:00

## 2017-10-31 RX ADMIN — MIDAZOLAM HYDROCHLORIDE 1 MG: 1 INJECTION INTRAMUSCULAR; INTRAVENOUS at 15:50:00

## 2017-10-31 RX ADMIN — SODIUM CHLORIDE: 9 INJECTION, SOLUTION INTRAVENOUS at 08:00:00

## 2017-10-31 RX ADMIN — EZETIMIBE 10 MG: 10 TABLET ORAL at 20:02:00

## 2017-10-31 RX ADMIN — SODIUM CHLORIDE: 9 INJECTION, SOLUTION INTRAVENOUS at 14:15:00

## 2017-10-31 NOTE — PROGRESS NOTES
Called to room to pull 8F right femoral arterial sheath and 6F right femoral vein sheath. Both sheaths pulled without complications-patient tolerated well. Femostop applied to right groin. Manuela Bartlett RN, assessed site after Femostop applied.

## 2017-10-31 NOTE — PLAN OF CARE
Assumed care of patient at [phone] from cath lab. Left groin with femstop in place, bruised, but soft. Right groin with venous and arterial sheaths in place. Serial ACT's done and cath lab called to pull sheaths once ACT<180.  Patient given versed/fentanyl annalisa

## 2017-10-31 NOTE — PROGRESS NOTES
Patient with hx CABG multiple PCI OM graft, multiple PCI RCA PCI    8 Swazi sheath RFA and LFA  AR Guide with normal FFR  Then AL1 guide OM graft and 3.5 EBU LCA. Diffuse left main and 100% Cx. Retrograde with initally spiral and then Corsair Pro.  Retrogr

## 2017-11-01 ENCOUNTER — APPOINTMENT (OUTPATIENT)
Dept: ULTRASOUND IMAGING | Facility: HOSPITAL | Age: 77
DRG: 247 | End: 2017-11-01
Attending: INTERNAL MEDICINE
Payer: MEDICARE

## 2017-11-01 PROCEDURE — 3E053GC INTRODUCTION OF OTHER THERAPEUTIC SUBSTANCE INTO PERIPHERAL ARTERY, PERCUTANEOUS APPROACH: ICD-10-PCS | Performed by: RADIOLOGY

## 2017-11-01 PROCEDURE — 76882 US LMTD JT/FCL EVL NVASC XTR: CPT | Performed by: INTERNAL MEDICINE

## 2017-11-01 PROCEDURE — 36002 PSEUDOANEURYSM INJECTION TRT: CPT | Performed by: INTERNAL MEDICINE

## 2017-11-01 PROCEDURE — 76942 ECHO GUIDE FOR BIOPSY: CPT | Performed by: INTERNAL MEDICINE

## 2017-11-01 PROCEDURE — 80048 BASIC METABOLIC PNL TOTAL CA: CPT | Performed by: INTERNAL MEDICINE

## 2017-11-01 PROCEDURE — B44LZZZ ULTRASONOGRAPHY OF FEMORAL ARTERY: ICD-10-PCS | Performed by: RADIOLOGY

## 2017-11-01 PROCEDURE — 93925 LOWER EXTREMITY STUDY: CPT | Performed by: INTERNAL MEDICINE

## 2017-11-01 PROCEDURE — 85027 COMPLETE CBC AUTOMATED: CPT | Performed by: INTERNAL MEDICINE

## 2017-11-01 RX ADMIN — ASPIRIN 81 MG: 81 TABLET ORAL at 08:17:00

## 2017-11-01 RX ADMIN — SOTALOL HYDROCHLORIDE 80 MG: 80 TABLET ORAL at 20:55:00

## 2017-11-01 RX ADMIN — EZETIMIBE 10 MG: 10 TABLET ORAL at 20:55:00

## 2017-11-01 RX ADMIN — FLUOXETINE HYDROCHLORIDE 20 MG: 20 CAPSULE ORAL at 08:17:00

## 2017-11-01 RX ADMIN — MAGNESIUM OXIDE 400 MG (241.3 MG MAGNESIUM) TABLET 100 MG: TABLET at 08:19:00

## 2017-11-01 RX ADMIN — LOVASTATIN 80 MG: 40 TABLET ORAL at 20:55:00

## 2017-11-01 RX ADMIN — CLOPIDOGREL BISULFATE 75 MG: 75 TABLET ORAL at 08:17:00

## 2017-11-01 NOTE — PLAN OF CARE
Pt has remained hemodynamically stable throughout the day. Pt BP meds held this am for hypotension, in which BP has continued to improved. Pt remains asymptomatic, denies any dizziness or lightheadedness.   Pt up to the bathroom without difficulty or symp

## 2017-11-01 NOTE — PLAN OF CARE
Assumed care of pt @ 1930. Rec'd pt in bed, resting. VSS, afebrile. Tele=A paced, rate 70's. 'X systolic. Weaning levophed as tolerated to keep SBP >90.  R. DP absent, R. PT and L. DP/PT per doppler.   Bilateral groin sites intact, no hematoma no

## 2017-11-01 NOTE — PROCEDURES
[facility]  Procedure Note    Jesus Alvarado Patient Status:  Inpatient    3/11/1940 MRN [id number]   [facility] 2NE-A Attending Yusuf Castillo MD   Hosp Day # 1 PCP Fredo Herrera MD     Procedure: us guided pseudoaneurysm

## 2017-11-01 NOTE — PROGRESS NOTES
[facility]  Progress Note    Igor Theodore Patient Status:  Inpatient    3/11/1940 MRN [id number]   [facility] 6NE-A Attending Nneka Castillo MD   Hosp Day # 1 PCP Courtney Machado MD     Subjective:  Patient seen and exa 11/01/2017   MCHC 34.1 11/01/2017   RDW 12.4 11/01/2017   .0 11/01/2017       Lab Results  Component Value Date   INR 1.01 04/04/2016       Medications:    Current Facility-Administered Medications:  0.9%  NaCl infusion  Intravenous Continuous   nor

## 2017-11-01 NOTE — DIETARY NOTE
Nutrition Short Note    Dietitian consult received per cardiac rehab/CHF protocol. Pt to be educated by cardiac rehab staff and encouraged to attend outpatient classes taught by RD. RD available PRN.     Marie Garcia, [address] [address]

## 2017-11-02 ENCOUNTER — APPOINTMENT (OUTPATIENT)
Dept: ULTRASOUND IMAGING | Facility: HOSPITAL | Age: 77
DRG: 247 | End: 2017-11-02
Attending: RADIOLOGY
Payer: MEDICARE

## 2017-11-02 VITALS
RESPIRATION RATE: 18 BRPM | DIASTOLIC BLOOD PRESSURE: 56 MMHG | SYSTOLIC BLOOD PRESSURE: 139 MMHG | HEIGHT: 61.5 IN | TEMPERATURE: 98 F | WEIGHT: 164.25 LBS | BODY MASS INDEX: 30.61 KG/M2 | HEART RATE: 71 BPM | OXYGEN SATURATION: 99 %

## 2017-11-02 PROCEDURE — 93926 LOWER EXTREMITY STUDY: CPT | Performed by: RADIOLOGY

## 2017-11-02 RX ADMIN — MAGNESIUM OXIDE 400 MG (241.3 MG MAGNESIUM) TABLET 100 MG: TABLET at 08:10:00

## 2017-11-02 RX ADMIN — FLUOXETINE HYDROCHLORIDE 20 MG: 20 CAPSULE ORAL at 08:10:00

## 2017-11-02 RX ADMIN — CLOPIDOGREL BISULFATE 75 MG: 75 TABLET ORAL at 08:10:00

## 2017-11-02 RX ADMIN — SOTALOL HYDROCHLORIDE 80 MG: 80 TABLET ORAL at 08:10:00

## 2017-11-02 RX ADMIN — LISINOPRIL 20 MG: 20 TABLET ORAL at 08:10:00

## 2017-11-02 RX ADMIN — ASPIRIN 81 MG: 81 TABLET ORAL at 08:10:00

## 2017-11-02 NOTE — PLAN OF CARE
Problem: Patient/Family Goals  Goal: Patient/Family Long Term Goal  Patient's Long Term Goal: to be discharged home    Interventions:  -Assess discharge needs  -Provide discharge instructions  -Collaborate with interdisciplinary team & patient in regards t integrity  - Assess and document dressing/incision, wound bed, drain sites and surrounding tissue  - Implement wound care per orders  - Initiate isolation precautions as appropriate  - Initiate Pressure Ulcer prevention bundle as indicated   Outcome: Compl

## 2017-11-02 NOTE — CM/SW NOTE
11/02/17 1311   CM/SW Screening   Referral [phone] [facility] staff; Chart review;Nursing rounds   Patient's Mental Status Alert;Oriented   Patient's [address]   Patient lives with Alone   Patient Status Prior to Ad

## 2017-11-02 NOTE — PLAN OF CARE
Received from IR after ultrasound guided thrombin injection to left groin pseudoaneurysm  Awake alert and oriented x3, denies chest pain  Both groins soft, puffy and with ecchymosis, tender to touch  Left groin site with bandaid no bleeding noted  Flat in

## 2017-11-02 NOTE — PLAN OF CARE
NURSING DISCHARGE NOTE    Discharged to home via wheelchair. Accompanied by family, staff. Belongings sent. DC instructions given, pt verbalizes understanding.

## 2017-11-02 NOTE — PROGRESS NOTES
[facility]  Cardiology Progress Note    Irma Riley Patient Status:  Inpatient    3/11/1940 MRN [id number]   [facility] 2NE-A Attending Colette Mackey MD   Hosp Day # 2 PCP Neel Infante MD     Subjective:  Lucero Martel

## 2017-11-08 NOTE — DISCHARGE SUMMARY
[facility]  [phone]  : 1940  ACCOUNT:  [id number]  PCP: Ezequiel Bhatt M.D. CARDIOLOGIST: Rebekah Kolb M.D.    Hospital: [facility]   Admitted: 10/31/2017  Discharged:  2017    DISCHARGE SUMMARY    DISCHARGE DIAGNOSES:   1. Status pos

## 2017-11-30 NOTE — H&P
[facility]    PATIENT'S NAME: Saranya Cain   ATTENDING PHYSICIAN: Marion Chen M.D.    PATIENT ACCOUNT#:   [de-identified]    LOCATION:  [address] [place] [address]  MEDICAL RECORD #:   [id number]       YOB: 1940  ADMISSION DATE:       10/31 15:20:38  [facility] [id number]  [facility]/

## 2017-11-30 NOTE — PROCEDURES
[facility]    PATIENT'S NAME: Gotti Harris   ATTENDING PHYSICIAN: Patrick Lopez M.D. OPERATING PHYSICIAN: Chiki Billy M.D.    PATIENT ACCOUNT#:   [de-identified]    LOCATION:  [address] [place] [address]  MEDICAL RECORD #:   [id number]       DATE OF BIR

## 2018-02-06 PROBLEM — E78.00 PURE HYPERCHOLESTEROLEMIA: Status: ACTIVE | Noted: 2018-02-06

## 2018-02-06 PROBLEM — I25.5 ISCHEMIC CARDIOMYOPATHY: Status: ACTIVE | Noted: 2018-02-06

## 2018-02-06 PROCEDURE — 81003 URINALYSIS AUTO W/O SCOPE: CPT | Performed by: INTERNAL MEDICINE

## 2018-02-06 PROCEDURE — 82570 ASSAY OF URINE CREATININE: CPT | Performed by: INTERNAL MEDICINE

## 2018-02-06 PROCEDURE — 82043 UR ALBUMIN QUANTITATIVE: CPT | Performed by: INTERNAL MEDICINE

## 2018-05-16 ENCOUNTER — HOSPITAL ENCOUNTER (OUTPATIENT)
Dept: CARDIOLOGY CLINIC | Facility: HOSPITAL | Age: 78
Discharge: HOME | End: 2018-05-16
Attending: INTERNAL MEDICINE

## 2018-05-16 DIAGNOSIS — I65.23 BILATERAL CAROTID ARTERY STENOSIS: ICD-10-CM

## 2018-08-30 ENCOUNTER — LAB ENCOUNTER (OUTPATIENT)
Dept: LAB | Age: 78
End: 2018-08-30
Attending: INTERNAL MEDICINE
Payer: MEDICARE

## 2018-08-30 DIAGNOSIS — I10 ESSENTIAL HYPERTENSION, MALIGNANT: ICD-10-CM

## 2018-08-30 DIAGNOSIS — I65.23 BILATERAL CAROTID ARTERY STENOSIS: ICD-10-CM

## 2018-08-30 DIAGNOSIS — E78.2 MIXED HYPERLIPIDEMIA: Primary | ICD-10-CM

## 2018-08-30 LAB
ALT SERPL-CCNC: 33 U/L (ref 14–54)
AST SERPL-CCNC: 30 U/L (ref 15–41)
CHOLEST SMN-MCNC: 124 MG/DL (ref ?–200)
HDLC SERPL-MCNC: 65 MG/DL (ref 40–59)
LDLC SERPL CALC-MCNC: 42 MG/DL (ref ?–100)
NONHDLC SERPL-MCNC: 59 MG/DL (ref ?–130)
TRIGL SERPL-MCNC: 84 MG/DL (ref 30–149)
VLDLC SERPL CALC-MCNC: 17 MG/DL (ref 0–30)

## 2018-08-30 PROCEDURE — 84450 TRANSFERASE (AST) (SGOT): CPT

## 2018-08-30 PROCEDURE — 36415 COLL VENOUS BLD VENIPUNCTURE: CPT

## 2018-08-30 PROCEDURE — 80061 LIPID PANEL: CPT

## 2018-08-30 PROCEDURE — 84460 ALANINE AMINO (ALT) (SGPT): CPT

## 2019-02-08 PROCEDURE — 81003 URINALYSIS AUTO W/O SCOPE: CPT | Performed by: INTERNAL MEDICINE

## 2019-02-08 PROCEDURE — 82570 ASSAY OF URINE CREATININE: CPT | Performed by: INTERNAL MEDICINE

## 2019-02-08 PROCEDURE — 82043 UR ALBUMIN QUANTITATIVE: CPT | Performed by: INTERNAL MEDICINE

## 2019-06-03 PROBLEM — S42.251D CLOSED DISPLACED FRACTURE OF GREATER TUBEROSITY OF RIGHT HUMERUS WITH ROUTINE HEALING, SUBSEQUENT ENCOUNTER: Status: ACTIVE | Noted: 2019-06-03

## 2020-03-24 NOTE — HISTORICAL OFFICE NOTE
Progress Notes  - documented in this encounter  Daysi Diego MD - 2020 1:30 PM CST  Formatting of this note might be different from the original.  [address] [place] [address]  Clinic Note    Jojo Nipjeromy  : 3/11/1940  PCP: Joyce Simmons smoked. She has never used smokeless tobacco. She reports current alcohol use.     Allergies:  ALLERGIES:   Allergen Reactions   • Atorvastatin Other (See Comments)   Unknown   • No Name Available Other (See Comments)   No Known Food Allergies     Medicatio ]    Data:    Diagnosis:  Patient Active Problem List   Diagnosis   • Carotid artery occlusion   • Chronic total occlusion of coronary artery   • Essential hypertension   • Hx of CABG   • Ischemic cardiomyopathy   • Pure hypercholesterolemia   • S/P chano

## 2020-03-26 ENCOUNTER — HOSPITAL ENCOUNTER (OUTPATIENT)
Dept: INTERVENTIONAL RADIOLOGY/VASCULAR | Facility: HOSPITAL | Age: 80
Discharge: HOME | End: 2020-03-26
Attending: INTERNAL MEDICINE | Admitting: INTERNAL MEDICINE
Payer: MEDICARE

## 2020-03-26 VITALS
SYSTOLIC BLOOD PRESSURE: 105 MMHG | HEART RATE: 69 BPM | DIASTOLIC BLOOD PRESSURE: 38 MMHG | TEMPERATURE: 97 F | RESPIRATION RATE: 25 BRPM | OXYGEN SATURATION: 97 %

## 2020-03-26 DIAGNOSIS — I25.5 ISCHEMIC CARDIOMYOPATHY: ICD-10-CM

## 2020-03-26 DIAGNOSIS — Z45.02 ICD (IMPLANTABLE CARDIOVERTER-DEFIBRILLATOR) BATTERY DEPLETION: ICD-10-CM

## 2020-03-26 LAB
BASOPHILS # BLD AUTO: 0.06 X10(3) UL (ref 0–0.2)
BASOPHILS NFR BLD AUTO: 1.1 %
DEPRECATED RDW RBC AUTO: 40.1 FL (ref 35.1–46.3)
EOSINOPHIL # BLD AUTO: 0.16 X10(3) UL (ref 0–0.7)
EOSINOPHIL NFR BLD AUTO: 2.9 %
ERYTHROCYTE [DISTWIDTH] IN BLOOD BY AUTOMATED COUNT: 12 % (ref 11–15)
HCT VFR BLD AUTO: 45.9 % (ref 35–48)
HGB BLD-MCNC: 15.6 G/DL (ref 12–16)
IMM GRANULOCYTES # BLD AUTO: 0.01 X10(3) UL (ref 0–1)
IMM GRANULOCYTES NFR BLD: 0.2 %
LYMPHOCYTES # BLD AUTO: 1.6 X10(3) UL (ref 1–4)
LYMPHOCYTES NFR BLD AUTO: 29 %
MCH RBC QN AUTO: 31.1 PG (ref 26–34)
MCHC RBC AUTO-ENTMCNC: 34 G/DL (ref 31–37)
MCV RBC AUTO: 91.6 FL (ref 80–100)
MONOCYTES # BLD AUTO: 0.52 X10(3) UL (ref 0.1–1)
MONOCYTES NFR BLD AUTO: 9.4 %
NEUTROPHILS # BLD AUTO: 3.16 X10 (3) UL (ref 1.5–7.7)
NEUTROPHILS # BLD AUTO: 3.16 X10(3) UL (ref 1.5–7.7)
NEUTROPHILS NFR BLD AUTO: 57.4 %
PLATELET # BLD AUTO: 161 10(3)UL (ref 150–450)
RBC # BLD AUTO: 5.01 X10(6)UL (ref 3.8–5.3)
WBC # BLD AUTO: 5.5 X10(3) UL (ref 4–11)

## 2020-03-26 PROCEDURE — 99153 MOD SED SAME PHYS/QHP EA: CPT

## 2020-03-26 PROCEDURE — 99152 MOD SED SAME PHYS/QHP 5/>YRS: CPT

## 2020-03-26 PROCEDURE — 0JH608Z INSERTION OF DEFIBRILLATOR GENERATOR INTO CHEST SUBCUTANEOUS TISSUE AND FASCIA, OPEN APPROACH: ICD-10-PCS | Performed by: INTERNAL MEDICINE

## 2020-03-26 PROCEDURE — 3E0102A INTRODUCTION OF ANTI-INFECTIVE ENVELOPE INTO SUBCUTANEOUS TISSUE, OPEN APPROACH: ICD-10-PCS | Performed by: INTERNAL MEDICINE

## 2020-03-26 PROCEDURE — 36415 COLL VENOUS BLD VENIPUNCTURE: CPT

## 2020-03-26 PROCEDURE — 0JPT0PZ REMOVAL OF CARDIAC RHYTHM RELATED DEVICE FROM TRUNK SUBCUTANEOUS TISSUE AND FASCIA, OPEN APPROACH: ICD-10-PCS | Performed by: INTERNAL MEDICINE

## 2020-03-26 PROCEDURE — 85025 COMPLETE CBC W/AUTO DIFF WBC: CPT | Performed by: INTERNAL MEDICINE

## 2020-03-26 PROCEDURE — 33263 RMVL & RPLCMT DFB GEN 2 LEAD: CPT

## 2020-03-26 RX ORDER — SODIUM CHLORIDE 9 MG/ML
INJECTION, SOLUTION INTRAVENOUS
Status: COMPLETED | OUTPATIENT
Start: 2020-03-27 | End: 2020-03-26

## 2020-03-26 RX ORDER — CHLORHEXIDINE GLUCONATE 4 G/100ML
30 SOLUTION TOPICAL
Status: COMPLETED | OUTPATIENT
Start: 2020-03-27 | End: 2020-03-26

## 2020-03-26 RX ORDER — CEFAZOLIN SODIUM/WATER 2 G/20 ML
2 SYRINGE (ML) INTRAVENOUS
Status: DISCONTINUED | OUTPATIENT
Start: 2020-03-26 | End: 2020-03-26 | Stop reason: HOSPADM

## 2020-03-26 RX ORDER — MIDAZOLAM HYDROCHLORIDE 1 MG/ML
INJECTION INTRAMUSCULAR; INTRAVENOUS
Status: COMPLETED
Start: 2020-03-26 | End: 2020-03-26

## 2020-03-26 RX ORDER — BACITRACIN 50000 [USP'U]/1
INJECTION, POWDER, LYOPHILIZED, FOR SOLUTION INTRAMUSCULAR
Status: COMPLETED
Start: 2020-03-26 | End: 2020-03-26

## 2020-03-26 RX ORDER — CEFAZOLIN SODIUM/WATER 2 G/20 ML
SYRINGE (ML) INTRAVENOUS
Status: COMPLETED
Start: 2020-03-26 | End: 2020-03-26

## 2020-03-26 RX ORDER — LIDOCAINE HYDROCHLORIDE 10 MG/ML
INJECTION, SOLUTION EPIDURAL; INFILTRATION; INTRACAUDAL; PERINEURAL
Status: COMPLETED
Start: 2020-03-26 | End: 2020-03-26

## 2020-03-26 RX ADMIN — CHLORHEXIDINE GLUCONATE 30 ML: 4 SOLUTION TOPICAL at 07:51:00

## 2020-03-26 RX ADMIN — SODIUM CHLORIDE: 9 INJECTION, SOLUTION INTRAVENOUS at 07:50:00

## 2020-03-26 NOTE — H&P
[facility]  H&P    Maribel Ball Patient Status:  Outpatient in a Bed    3/11/1940 MRN [id number]   Location [address] [facility] Attending Gonzalez Arciniega MD   Hosp Day # 0 PCP Katelyn Mccann 2/2015;    S/p PCI/MEHDI SVG-OM2/2016   • Atherosclerosis of coronary artery    • [facility]    • Cardiomyopathy ([facility]     EF 40%   • CKD (chronic kidney disease)     2-3   • Coronary atherosclerosis of unspecified type of vessel, native or g 02/13/2013    stent to RCA   • ANGIOPLASTY (CORONARY)  02/27/2013    Left subclavian angioplasty   • ANGIOPLASTY (CORONARY)  07/13/2011    Left subclavian angioplasty/stent   • ANGIOPLASTY (CORONARY)  06/08/2011    stent to RCA   • ARTHROSCOPY OF JOINT UNL (36.3 °C), Min:97.4 °F (36.3 °C), Max:97.4 °F (36.3 °C)    Wt Readings from Last 3 Encounters:  02/17/20 : 158 lb  07/29/19 : 155 lb  07/02/19 : 156 lb        General: Alert and oriented in no apparent distress. HEENT: No focal deficits.   Neck: No JVD, ca

## 2020-03-26 NOTE — PROGRESS NOTES
Rc'd pt from cath lab in stable condition s/p gen chagne. VSS. Aquacel to left chest is soft, clean and dry. No bleeding or hematoma. Pt denies c/o pain or discomfort. Dr Jhonatan Matamoros at bedside along with Prince Joshi from [address] [place] [address] Str.. Daughter at bedside.      11:00: Pt

## 2020-03-26 NOTE — PROCEDURES
OPERATION(S) PERFORMED:   1. Dual chamber ICD implant. 2. ICD generator removal.     : Amor Zapien MD  INDICATION: Device at MARK  COMPLICATIONS: None     ESTIMATED BLOOD LOSS: Minimal.  SEDATION: IV was maintained by RN.  Patient was assessed by my

## 2020-06-22 ENCOUNTER — HOSPITAL ENCOUNTER (OUTPATIENT)
Dept: CARDIOLOGY CLINIC | Facility: HOSPITAL | Age: 80
Discharge: HOME | End: 2020-06-22
Attending: INTERNAL MEDICINE
Payer: MEDICARE

## 2020-06-22 DIAGNOSIS — I65.23 BILATERAL CAROTID ARTERY STENOSIS: ICD-10-CM

## 2021-04-22 PROBLEM — I25.5 ISCHEMIC CARDIOMYOPATHY: Status: RESOLVED | Noted: 2018-02-06 | Resolved: 2021-04-22

## 2021-06-07 ENCOUNTER — HOSPITAL ENCOUNTER (OUTPATIENT)
Dept: CARDIOLOGY CLINIC | Facility: HOSPITAL | Age: 81
Discharge: HOME | End: 2021-06-07
Attending: INTERNAL MEDICINE
Payer: MEDICARE

## 2021-06-07 DIAGNOSIS — I65.23 BILATERAL CAROTID ARTERY STENOSIS: ICD-10-CM

## 2022-02-28 ENCOUNTER — HOSPITAL ENCOUNTER (OUTPATIENT)
Dept: GENERAL RADIOLOGY | Facility: HOSPITAL | Age: 82
Discharge: HOME | End: 2022-02-28
Attending: INTERNAL MEDICINE
Payer: MEDICARE

## 2022-02-28 ENCOUNTER — LAB ENCOUNTER (OUTPATIENT)
Dept: LAB | Facility: HOSPITAL | Age: 82
End: 2022-02-28
Attending: INTERNAL MEDICINE
Payer: MEDICARE

## 2022-02-28 DIAGNOSIS — E55.9 VITAMIN D DEFICIENCY: ICD-10-CM

## 2022-02-28 DIAGNOSIS — I10 ESSENTIAL HYPERTENSION, MALIGNANT: ICD-10-CM

## 2022-02-28 DIAGNOSIS — I65.29 CAROTID ARTERY OCCLUSION: ICD-10-CM

## 2022-02-28 DIAGNOSIS — I25.82 COMPLETE OCCLUSION OF CORONARY ARTERY, CHRONIC: ICD-10-CM

## 2022-02-28 DIAGNOSIS — I25.5 ISCHEMIC CARDIOMYOPATHY: ICD-10-CM

## 2022-02-28 DIAGNOSIS — E11.22 TYPE 2 DIABETES MELLITUS WITH STAGE 3 CHRONIC KIDNEY DISEASE, WITHOUT LONG-TERM CURRENT USE OF INSULIN, UNSPECIFIED WHETHER STAGE 3A OR 3B CKD (HCC): ICD-10-CM

## 2022-02-28 DIAGNOSIS — E78.00 PURE HYPERCHOLESTEROLEMIA: ICD-10-CM

## 2022-02-28 DIAGNOSIS — I65.29 CAROTID ARTERY STENOSIS: Primary | ICD-10-CM

## 2022-02-28 DIAGNOSIS — D69.6 THROMBOCYTOPENIA (HCC): ICD-10-CM

## 2022-02-28 DIAGNOSIS — N18.30 STAGE 3 CHRONIC KIDNEY DISEASE, UNSPECIFIED WHETHER STAGE 3A OR 3B CKD (HCC): ICD-10-CM

## 2022-02-28 DIAGNOSIS — N18.30 TYPE 2 DIABETES MELLITUS WITH STAGE 3 CHRONIC KIDNEY DISEASE, WITHOUT LONG-TERM CURRENT USE OF INSULIN, UNSPECIFIED WHETHER STAGE 3A OR 3B CKD (HCC): ICD-10-CM

## 2022-02-28 LAB
ALBUMIN SERPL-MCNC: 3.6 G/DL (ref 3.4–5)
ALBUMIN/GLOB SERPL: 1.1 {RATIO} (ref 1–2)
ALP LIVER SERPL-CCNC: 81 U/L
ALT SERPL-CCNC: 28 U/L
ANION GAP SERPL CALC-SCNC: 5 MMOL/L (ref 0–18)
AST SERPL-CCNC: 24 U/L (ref 15–37)
BASOPHILS # BLD AUTO: 0.05 X10(3) UL (ref 0–0.2)
BASOPHILS NFR BLD AUTO: 0.8 %
BILIRUB SERPL-MCNC: 0.5 MG/DL (ref 0.1–2)
BILIRUB UR QL STRIP.AUTO: NEGATIVE
BUN BLD-MCNC: 22 MG/DL (ref 7–18)
CALCIUM BLD-MCNC: 9.4 MG/DL (ref 8.5–10.1)
CHLORIDE SERPL-SCNC: 106 MMOL/L (ref 98–112)
CHOLEST SERPL-MCNC: 166 MG/DL (ref ?–200)
CLARITY UR REFRACT.AUTO: CLEAR
CO2 SERPL-SCNC: 31 MMOL/L (ref 21–32)
COLOR UR AUTO: YELLOW
CREAT BLD-MCNC: 0.89 MG/DL
CREAT UR-SCNC: 53.2 MG/DL
EOSINOPHIL # BLD AUTO: 0.17 X10(3) UL (ref 0–0.7)
EOSINOPHIL NFR BLD AUTO: 2.7 %
ERYTHROCYTE [DISTWIDTH] IN BLOOD BY AUTOMATED COUNT: 12.1 %
EST. AVERAGE GLUCOSE BLD GHB EST-MCNC: 131 MG/DL (ref 68–126)
FASTING PATIENT LIPID ANSWER: NO
FASTING STATUS PATIENT QL REPORTED: NO
GLOBULIN PLAS-MCNC: 3.3 G/DL (ref 2.8–4.4)
GLUCOSE BLD-MCNC: 118 MG/DL (ref 70–99)
GLUCOSE UR STRIP.AUTO-MCNC: NEGATIVE MG/DL
HBA1C MFR BLD: 6.2 % (ref ?–5.7)
HCT VFR BLD AUTO: 44 %
HDLC SERPL-MCNC: 76 MG/DL (ref 40–59)
HGB BLD-MCNC: 14.6 G/DL
IMM GRANULOCYTES # BLD AUTO: 0.04 X10(3) UL (ref 0–1)
IMM GRANULOCYTES NFR BLD: 0.6 %
KETONES UR STRIP.AUTO-MCNC: NEGATIVE MG/DL
LDLC SERPL CALC-MCNC: 74 MG/DL (ref ?–100)
LEUKOCYTE ESTERASE UR QL STRIP.AUTO: NEGATIVE
LYMPHOCYTES # BLD AUTO: 1.09 X10(3) UL (ref 1–4)
LYMPHOCYTES NFR BLD AUTO: 17.5 %
MCH RBC QN AUTO: 30.4 PG (ref 26–34)
MCHC RBC AUTO-ENTMCNC: 33.2 G/DL (ref 31–37)
MCV RBC AUTO: 91.5 FL
MICROALBUMIN/CREAT 24H UR-RTO: 16.7 UG/MG (ref ?–30)
MONOCYTES # BLD AUTO: 0.55 X10(3) UL (ref 0.1–1)
MONOCYTES NFR BLD AUTO: 8.8 %
NEUTROPHILS # BLD AUTO: 4.34 X10 (3) UL (ref 1.5–7.7)
NEUTROPHILS # BLD AUTO: 4.34 X10(3) UL (ref 1.5–7.7)
NEUTROPHILS NFR BLD AUTO: 69.6 %
NITRITE UR QL STRIP.AUTO: NEGATIVE
NONHDLC SERPL-MCNC: 90 MG/DL (ref ?–130)
OSMOLALITY SERPL CALC.SUM OF ELEC: 298 MOSM/KG (ref 275–295)
PH UR STRIP.AUTO: 5 [PH] (ref 5–8)
PLATELET # BLD AUTO: 146 10(3)UL (ref 150–450)
POTASSIUM SERPL-SCNC: 4.2 MMOL/L (ref 3.5–5.1)
PROT SERPL-MCNC: 6.9 G/DL (ref 6.4–8.2)
PROT UR STRIP.AUTO-MCNC: NEGATIVE MG/DL
RBC # BLD AUTO: 4.81 X10(6)UL
RBC UR QL AUTO: NEGATIVE
SODIUM SERPL-SCNC: 142 MMOL/L (ref 136–145)
SP GR UR STRIP.AUTO: 1.01 (ref 1–1.03)
TRIGL SERPL-MCNC: 85 MG/DL (ref 30–149)
UROBILINOGEN UR STRIP.AUTO-MCNC: <2 MG/DL
VIT D+METAB SERPL-MCNC: 55.2 NG/ML (ref 30–100)
VLDLC SERPL CALC-MCNC: 13 MG/DL (ref 0–30)
WBC # BLD AUTO: 6.2 X10(3) UL (ref 4–11)

## 2022-02-28 PROCEDURE — 82043 UR ALBUMIN QUANTITATIVE: CPT

## 2022-02-28 PROCEDURE — 85025 COMPLETE CBC W/AUTO DIFF WBC: CPT

## 2022-02-28 PROCEDURE — 81003 URINALYSIS AUTO W/O SCOPE: CPT

## 2022-02-28 PROCEDURE — 80053 COMPREHEN METABOLIC PANEL: CPT

## 2022-02-28 PROCEDURE — 82570 ASSAY OF URINE CREATININE: CPT

## 2022-02-28 PROCEDURE — 71046 X-RAY EXAM CHEST 2 VIEWS: CPT | Performed by: INTERNAL MEDICINE

## 2022-02-28 PROCEDURE — 82306 VITAMIN D 25 HYDROXY: CPT

## 2022-02-28 PROCEDURE — 83036 HEMOGLOBIN GLYCOSYLATED A1C: CPT

## 2022-02-28 PROCEDURE — 80061 LIPID PANEL: CPT

## 2022-02-28 PROCEDURE — 36415 COLL VENOUS BLD VENIPUNCTURE: CPT

## 2022-03-19 ENCOUNTER — LAB ENCOUNTER (OUTPATIENT)
Dept: LAB | Facility: HOSPITAL | Age: 82
End: 2022-03-19
Attending: INTERNAL MEDICINE
Payer: MEDICARE

## 2022-03-19 DIAGNOSIS — Z01.818 ENCOUNTER FOR PREOPERATIVE ASSESSMENT: ICD-10-CM

## 2022-03-19 LAB — SARS-COV-2 RNA RESP QL NAA+PROBE: NOT DETECTED

## 2022-03-22 ENCOUNTER — HOSPITAL ENCOUNTER (OUTPATIENT)
Dept: INTERVENTIONAL RADIOLOGY/VASCULAR | Facility: HOSPITAL | Age: 82
Discharge: HOME | End: 2022-03-22
Attending: INTERNAL MEDICINE | Admitting: INTERNAL MEDICINE
Payer: MEDICARE

## 2022-03-22 VITALS
RESPIRATION RATE: 12 BRPM | TEMPERATURE: 97 F | OXYGEN SATURATION: 95 % | SYSTOLIC BLOOD PRESSURE: 109 MMHG | HEART RATE: 69 BPM | DIASTOLIC BLOOD PRESSURE: 58 MMHG

## 2022-03-22 DIAGNOSIS — R94.39 ABNORMAL NUCLEAR STRESS TEST: ICD-10-CM

## 2022-03-22 PROCEDURE — 4A023N7 MEASUREMENT OF CARDIAC SAMPLING AND PRESSURE, LEFT HEART, PERCUTANEOUS APPROACH: ICD-10-PCS | Performed by: INTERNAL MEDICINE

## 2022-03-22 PROCEDURE — B2131ZZ FLUOROSCOPY OF MULTIPLE CORONARY ARTERY BYPASS GRAFTS USING LOW OSMOLAR CONTRAST: ICD-10-PCS | Performed by: INTERNAL MEDICINE

## 2022-03-22 PROCEDURE — 99152 MOD SED SAME PHYS/QHP 5/>YRS: CPT

## 2022-03-22 PROCEDURE — 93459 L HRT ART/GRFT ANGIO: CPT

## 2022-03-22 RX ORDER — LIDOCAINE HYDROCHLORIDE 10 MG/ML
INJECTION, SOLUTION EPIDURAL; INFILTRATION; INTRACAUDAL; PERINEURAL
Status: COMPLETED
Start: 2022-03-22 | End: 2022-03-22

## 2022-03-22 RX ORDER — SODIUM CHLORIDE 9 MG/ML
INJECTION, SOLUTION INTRAVENOUS
Status: DISCONTINUED | OUTPATIENT
Start: 2022-03-23 | End: 2022-03-22 | Stop reason: HOSPADM

## 2022-03-22 RX ORDER — HEPARIN SODIUM 5000 [USP'U]/ML
INJECTION, SOLUTION INTRAVENOUS; SUBCUTANEOUS
Status: COMPLETED
Start: 2022-03-22 | End: 2022-03-22

## 2022-03-22 RX ORDER — MIDAZOLAM HYDROCHLORIDE 1 MG/ML
INJECTION INTRAMUSCULAR; INTRAVENOUS
Status: COMPLETED
Start: 2022-03-22 | End: 2022-03-22

## 2022-03-22 NOTE — PROGRESS NOTES
S/p lhc, right groin soft C/D/I. Pt arrived via bed awake without complaints, denies pain 0/10 on pain scale. Bedrest completed, tolerating PO intake. Up in room without complaints, voiding without complaints. MD speaking with patient/daughtert. Discharge instructions given/explained to patient/daughter, all questions answered, verbalized understanding. Tele dc'd, IV dc'd catheter intact, patient discharged via w/c instable condition. POC updated for patient to come back next week for OP laser procedure. [facility] called left message with Nelsy Nelson at [phone] for [facility]  to call patient to schedule procedure.

## 2022-03-24 ENCOUNTER — ORDER TRANSCRIPTION (OUTPATIENT)
Dept: ADMINISTRATIVE | Facility: HOSPITAL | Age: 82
End: 2022-03-24

## 2022-03-26 ENCOUNTER — LAB ENCOUNTER (OUTPATIENT)
Dept: LAB | Facility: HOSPITAL | Age: 82
End: 2022-03-26
Attending: INTERNAL MEDICINE
Payer: MEDICARE

## 2022-03-26 DIAGNOSIS — Z01.818 PREOP EXAMINATION: ICD-10-CM

## 2022-03-27 LAB — SARS-COV-2 RNA RESP QL NAA+PROBE: NOT DETECTED

## 2022-03-29 ENCOUNTER — HOSPITAL ENCOUNTER (OUTPATIENT)
Dept: INTERVENTIONAL RADIOLOGY/VASCULAR | Facility: HOSPITAL | Age: 82
Setting detail: OBSERVATION
Discharge: HOME | End: 2022-03-30
Attending: INTERNAL MEDICINE | Admitting: INTERNAL MEDICINE
Payer: MEDICARE

## 2022-03-29 DIAGNOSIS — I25.10 CAD (CORONARY ARTERY DISEASE): ICD-10-CM

## 2022-03-29 LAB
HCT VFR BLD AUTO: 38.7 %
HGB BLD-MCNC: 13 G/DL
ISTAT ACTIVATED CLOTTING TIME: 345 SECONDS (ref 74–137)
ISTAT ACTIVATED CLOTTING TIME: 458 SECONDS (ref 74–137)

## 2022-03-29 PROCEDURE — 99153 MOD SED SAME PHYS/QHP EA: CPT

## 2022-03-29 PROCEDURE — 93005 ELECTROCARDIOGRAM TRACING: CPT

## 2022-03-29 PROCEDURE — 92978 ENDOLUMINL IVUS OCT C 1ST: CPT

## 2022-03-29 PROCEDURE — 02F03ZZ FRAGMENTATION IN CORONARY ARTERY, ONE ARTERY, PERCUTANEOUS APPROACH: ICD-10-PCS | Performed by: INTERNAL MEDICINE

## 2022-03-29 PROCEDURE — B240ZZ3 ULTRASONOGRAPHY OF SINGLE CORONARY ARTERY, INTRAVASCULAR: ICD-10-PCS | Performed by: INTERNAL MEDICINE

## 2022-03-29 PROCEDURE — 93010 ELECTROCARDIOGRAM REPORT: CPT | Performed by: INTERNAL MEDICINE

## 2022-03-29 PROCEDURE — 027034Z DILATION OF CORONARY ARTERY, ONE ARTERY WITH DRUG-ELUTING INTRALUMINAL DEVICE, PERCUTANEOUS APPROACH: ICD-10-PCS | Performed by: INTERNAL MEDICINE

## 2022-03-29 PROCEDURE — 02C03ZZ EXTIRPATION OF MATTER FROM CORONARY ARTERY, ONE ARTERY, PERCUTANEOUS APPROACH: ICD-10-PCS | Performed by: INTERNAL MEDICINE

## 2022-03-29 PROCEDURE — 85014 HEMATOCRIT: CPT | Performed by: NURSE PRACTITIONER

## 2022-03-29 PROCEDURE — 99152 MOD SED SAME PHYS/QHP 5/>YRS: CPT

## 2022-03-29 PROCEDURE — 85347 COAGULATION TIME ACTIVATED: CPT

## 2022-03-29 PROCEDURE — 85018 HEMOGLOBIN: CPT | Performed by: NURSE PRACTITIONER

## 2022-03-29 RX ORDER — SODIUM CHLORIDE 9 MG/ML
INJECTION, SOLUTION INTRAVENOUS
Status: COMPLETED | OUTPATIENT
Start: 2022-03-30 | End: 2022-03-29

## 2022-03-29 RX ORDER — LIDOCAINE HYDROCHLORIDE 10 MG/ML
INJECTION, SOLUTION EPIDURAL; INFILTRATION; INTRACAUDAL; PERINEURAL
Status: COMPLETED
Start: 2022-03-29 | End: 2022-03-29

## 2022-03-29 RX ORDER — HEPARIN SODIUM 5000 [USP'U]/ML
INJECTION, SOLUTION INTRAVENOUS; SUBCUTANEOUS
Status: COMPLETED
Start: 2022-03-29 | End: 2022-03-29

## 2022-03-29 RX ORDER — NITROGLYCERIN 20 MG/100ML
INJECTION INTRAVENOUS
Status: COMPLETED
Start: 2022-03-29 | End: 2022-03-29

## 2022-03-29 RX ORDER — LOSARTAN POTASSIUM 50 MG/1
50 TABLET ORAL DAILY
Refills: 3 | Status: DISCONTINUED | OUTPATIENT
Start: 2022-03-30 | End: 2022-03-30

## 2022-03-29 RX ORDER — SOTALOL HYDROCHLORIDE 80 MG/1
80 TABLET ORAL
Status: DISCONTINUED | OUTPATIENT
Start: 2022-03-29 | End: 2022-03-30

## 2022-03-29 RX ORDER — MIDAZOLAM HYDROCHLORIDE 1 MG/ML
INJECTION INTRAMUSCULAR; INTRAVENOUS
Status: COMPLETED
Start: 2022-03-29 | End: 2022-03-29

## 2022-03-29 RX ORDER — ASPIRIN 81 MG/1
81 TABLET, CHEWABLE ORAL DAILY
Status: DISCONTINUED | OUTPATIENT
Start: 2022-03-30 | End: 2022-03-30

## 2022-03-29 RX ORDER — ACETAMINOPHEN 325 MG/1
650 TABLET ORAL EVERY 6 HOURS PRN
Status: DISCONTINUED | OUTPATIENT
Start: 2022-03-29 | End: 2022-03-30

## 2022-03-29 RX ORDER — ADENOSINE 3 MG/ML
INJECTION, SOLUTION INTRAVENOUS
Status: COMPLETED
Start: 2022-03-29 | End: 2022-03-29

## 2022-03-29 RX ORDER — DIPHENHYDRAMINE HYDROCHLORIDE 50 MG/ML
INJECTION INTRAMUSCULAR; INTRAVENOUS
Status: COMPLETED
Start: 2022-03-29 | End: 2022-03-29

## 2022-03-29 RX ORDER — HYDROCODONE BITARTRATE AND ACETAMINOPHEN 5; 325 MG/1; MG/1
2 TABLET ORAL EVERY 4 HOURS PRN
Status: DISCONTINUED | OUTPATIENT
Start: 2022-03-29 | End: 2022-03-30

## 2022-03-29 RX ORDER — CLOPIDOGREL BISULFATE 75 MG/1
75 TABLET ORAL DAILY
Status: DISCONTINUED | OUTPATIENT
Start: 2022-03-30 | End: 2022-03-30

## 2022-03-29 RX ORDER — ATORVASTATIN CALCIUM 20 MG/1
20 TABLET, FILM COATED ORAL NIGHTLY
Status: DISCONTINUED | OUTPATIENT
Start: 2022-03-29 | End: 2022-03-29

## 2022-03-29 RX ORDER — HYDROCODONE BITARTRATE AND ACETAMINOPHEN 5; 325 MG/1; MG/1
1 TABLET ORAL EVERY 4 HOURS PRN
Status: DISCONTINUED | OUTPATIENT
Start: 2022-03-29 | End: 2022-03-30

## 2022-03-29 RX ORDER — CLOPIDOGREL BISULFATE 75 MG/1
TABLET ORAL
Status: COMPLETED
Start: 2022-03-29 | End: 2022-03-29

## 2022-03-29 RX ADMIN — ACETAMINOPHEN 650 MG: 325 TABLET ORAL at 23:39:00

## 2022-03-29 RX ADMIN — SODIUM CHLORIDE: 9 INJECTION, SOLUTION INTRAVENOUS at 09:11:00

## 2022-03-29 RX ADMIN — SOTALOL HYDROCHLORIDE 80 MG: 80 TABLET ORAL at 18:58:00

## 2022-03-29 RX ADMIN — ACETAMINOPHEN 650 MG: 325 TABLET ORAL at 17:10:00

## 2022-03-29 NOTE — IVS NOTE
Cath lab charge was called by DELIA Jesus regarding groin oozing despite manual pressure x20 min. Angioseal deployed at time of angiogram (see dictation by Dr. Regi Grimes). Groin site is soft, +oozing, patient stable laying supine. Additional 30 minutes of manual pressure. Oozing improved but is still present. femstop applied. Dr. Regi Grimes called. New shift nurse DELIA Dowell and daughter Terrell Caren at bedside as well.

## 2022-03-29 NOTE — PROGRESS NOTES
Assumed care of patient around . Cath lab staff present at bedside assisting with oozing left groin site. Femstop applied. After about 15 minutes, gauze on site saturated. Cards APN and cath lab staff notified. Manual pressure held and quick clot and femstop reapplied by cath lab rn. Quick clot eventually saturated. Cards APN notified. APN and MD at bedside. Applied gauze and pressure tape as well as femstop. Instructed to keep on femstop for two hours and remain on bedrest for remainder of night. Vitals remain stable.

## 2022-03-29 NOTE — PROGRESS NOTES
NURSING ADMISSION NOTE      Patient admitted via Cart  Oriented to room. Safety precautions initiated. Bed in low position. Call light in reach. Admission navigator completed. Pt post cath. Oozing to left groin. Cardiology APN notified. This RN held pressure for approximately 20 minutes. Cath lab came to assist and look at site. Cath lab RN held pressure for additional 30 minutes. Fem stop applied 1515. Left groin still oozing. Pedal pulse 1+. Dr. Enrique Ramana to come bedside. Pt A&O4. A paced. Lungs clear on RA. Call light within reach. Skin check performed with [facility].

## 2022-03-29 NOTE — PROGRESS NOTES
Reviewed with Dr. Klaus Boles- will repostion fem-stop. Small ooze- discussed risk/ complication of surgery with patient/ family.  Since small intermittent ooze - should stop and believe this should stop

## 2022-03-30 VITALS
RESPIRATION RATE: 18 BRPM | OXYGEN SATURATION: 93 % | DIASTOLIC BLOOD PRESSURE: 51 MMHG | BODY MASS INDEX: 30.02 KG/M2 | WEIGHT: 159 LBS | HEART RATE: 70 BPM | SYSTOLIC BLOOD PRESSURE: 146 MMHG | TEMPERATURE: 98 F | HEIGHT: 61 IN

## 2022-03-30 LAB
ANION GAP SERPL CALC-SCNC: 3 MMOL/L (ref 0–18)
BASOPHILS # BLD AUTO: 0.02 X10(3) UL (ref 0–0.2)
BUN BLD-MCNC: 18 MG/DL (ref 7–18)
CALCIUM BLD-MCNC: 9.1 MG/DL (ref 8.5–10.1)
CHLORIDE SERPL-SCNC: 106 MMOL/L (ref 98–112)
CO2 SERPL-SCNC: 32 MMOL/L (ref 21–32)
CREAT BLD-MCNC: 0.75 MG/DL
EOSINOPHIL # BLD AUTO: 0.28 X10(3) UL (ref 0–0.7)
EOSINOPHIL NFR BLD AUTO: 5.1 %
ERYTHROCYTE [DISTWIDTH] IN BLOOD BY AUTOMATED COUNT: 12.2 %
GLUCOSE BLD-MCNC: 87 MG/DL (ref 70–99)
HCT VFR BLD AUTO: 37 %
HGB BLD-MCNC: 12.5 G/DL
IMM GRANULOCYTES # BLD AUTO: 0.01 X10(3) UL (ref 0–1)
LYMPHOCYTES # BLD AUTO: 0.7 X10(3) UL (ref 1–4)
LYMPHOCYTES NFR BLD AUTO: 12.7 %
MCH RBC QN AUTO: 31.1 PG (ref 26–34)
MCHC RBC AUTO-ENTMCNC: 33.8 G/DL (ref 31–37)
MCV RBC AUTO: 92 FL
MONOCYTES # BLD AUTO: 0.51 X10(3) UL (ref 0.1–1)
MONOCYTES NFR BLD AUTO: 9.3 %
NEUTROPHILS # BLD AUTO: 3.99 X10 (3) UL (ref 1.5–7.7)
NEUTROPHILS # BLD AUTO: 3.99 X10(3) UL (ref 1.5–7.7)
NEUTROPHILS NFR BLD AUTO: 72.3 %
OSMOLALITY SERPL CALC.SUM OF ELEC: 293 MOSM/KG (ref 275–295)
PLATELET # BLD AUTO: 129 10(3)UL (ref 150–450)
POTASSIUM SERPL-SCNC: 3.6 MMOL/L (ref 3.5–5.1)
RBC # BLD AUTO: 4.02 X10(6)UL
SODIUM SERPL-SCNC: 141 MMOL/L (ref 136–145)
WBC # BLD AUTO: 5.5 X10(3) UL (ref 4–11)

## 2022-03-30 PROCEDURE — 85025 COMPLETE CBC W/AUTO DIFF WBC: CPT

## 2022-03-30 PROCEDURE — 80048 BASIC METABOLIC PNL TOTAL CA: CPT

## 2022-03-30 PROCEDURE — 99211 OFF/OP EST MAY X REQ PHY/QHP: CPT

## 2022-03-30 RX ORDER — POTASSIUM CHLORIDE 20 MEQ/1
40 TABLET, EXTENDED RELEASE ORAL EVERY 4 HOURS
Status: DISCONTINUED | OUTPATIENT
Start: 2022-03-30 | End: 2022-03-30

## 2022-03-30 RX ADMIN — SOTALOL HYDROCHLORIDE 80 MG: 80 TABLET ORAL at 06:45:00

## 2022-03-30 RX ADMIN — ASPIRIN 81 MG: 81 TABLET, CHEWABLE ORAL at 10:08:00

## 2022-03-30 RX ADMIN — LOSARTAN POTASSIUM 50 MG: 50 TABLET ORAL at 10:08:00

## 2022-03-30 RX ADMIN — POTASSIUM CHLORIDE 40 MEQ: 20 TABLET, EXTENDED RELEASE ORAL at 10:08:00

## 2022-03-30 RX ADMIN — CLOPIDOGREL BISULFATE 75 MG: 75 TABLET ORAL at 10:08:00

## 2022-03-30 NOTE — PROGRESS NOTES
Assumed care at 0730. Pt is A&Ox4. Pt is on RA with , sats maintaining >90%, lungs clear. NSR on tele, VSS. No complaints of cardiac symptoms. Continent of B&B. Denies pain at this time. Up w/ SBA after bed rest due to oozing, tolerating well. Plan of care reviewed with patient, verbalizes understanding, all needs addressed at this time, pt seems to be resting comfortably. Cleared for DC by all consults.

## 2022-03-30 NOTE — PROCEDURES
[facility]    PATIENT'S NAME: Serge Swan   ATTENDING PHYSICIAN: Amber Melendez M.D. OPERATING PHYSICIAN: Amber Melendez M.D. PATIENT ACCOUNT#:   [de-identified]    LOCATION:  [address] [place] [address]  MEDICAL RECORD #:   [id number]       YOB: 1940  ADMISSION DATE:       03/29/2022      OPERATION DATE:  03/29/2022    CARDIAC PROCEDURE TRANSCRIPTION    CARDIAC CATHETERIZATION/PERCUTANEOUS CORONARY INTERVENTION     PREOPERATIVE DIAGNOSIS:    POSTOPERATIVE DIAGNOSIS:    PROCEDURE PERFORMED:      INDICATION:  This is an 80-year-old female with history of prior bypass surgery with stenting of the native circumflex, hypertension, chronic kidney disease, with EF down to 38%. She has prior ICD. She has had prior stenting of the right. Angiography had shown a near subtotal lesion at the origin of the right coronary artery. DESCRIPTION OF PROCEDURE:  The right and left groin were anesthetized. A 7-Slovenian sheath was introduced. An AR1 guide was used. There is a tiny bit of prior stent sticking out in the order of it may be 1 mm. It is easy to access with the AR1. It was accessed, and a Runthrough wire was placed. I initially tried laser, had to predilate with a 2.5 balloon. Then laser atherectomy was done at 60/40 and 80/80. IVUS was performed and showed some moderate heavy calcification of the proximal right. There was 1 area that was significantly compressed concentrically. Following the laser, the patient did have ST elevation we treated with intracoronary nitro and intracoronary adenosine. A Shockwave 3.0 was done in overlapping fashion. Then, the vessel was predilated with a 3.0 NC. A 3.0 x 28 Skypoint was deployed. This was postdilated with a 3.5 NC with an overall excellent result. IMPRESSION:    1. Subtotal ostial right with moderate to severe proximal disease. 2.   Successful laser atherectomy with 0.9 laser and Shockwave with a 3.0 Shockwave.   3.   Stent deployment of a 3.0 x 28 Skypoint. 4.   IVUS-guided intervention was performed.      Dictated By Evgeny Yanes M.D.  d: 03/29/2022 13:17:29  t: 03/29/2022 18:44:48  [facility] [id number]  [facility]

## 2022-03-30 NOTE — CARDIAC REHAB
CAD education completed with patient. Stent card given to patient. Patient referred to outpatient cardiac rehab at [facility] at [phone].

## 2022-03-30 NOTE — PLAN OF CARE
Received patient, alert and oriented. Denied chest pain, denied SOB. On bedrest S/P LHC. Left groin with pressure dressing and femstop. Femstop removed at 2000 as endorsed by AM RN. Pressure dressing with bloody dressing. Patient reminded on bedrest. Discussed POC. . Turned and repositioned. Safety measures reinforced, call light within reach. Bed alarm on. Needs attended to. Will continue to monitor.

## 2022-03-31 NOTE — PROCEDURES
[facility]    PATIENT'S NAME: Fadia Ferrera   ATTENDING PHYSICIAN: Jadon [place] M.D. OPERATING PHYSICIAN: Jadon [place] M.D. PATIENT ACCOUNT#:   [de-identified]    LOCATION:  [address]  MEDICAL RECORD #:   [id number]       YOB: 1940  ADMISSION DATE:       03/22/2022      OPERATION DATE:  03/22/2022    CARDIAC PROCEDURE TRANSCRIPTION    CARDIAC CATHETERIZATION     PREOPERATIVE DIAGNOSIS:    POSTOPERATIVE DIAGNOSIS:    PROCEDURE PERFORMED:      INDICATION:  Patient with prior stenting of her circumflex and right coronary artery, with an ejection fraction that dropped to 38% and some reversible defect. DESCRIPTION OF PROCEDURE:  LV angiogram revealed an inferobasal, almost akinetic segment, with overall ejection fraction approximately 40% to 45%. Left coronary arteriography:  Stent to the left main and circumflex is widely patent. The vein graft to the circumflex is occluded. The vein graft to the right is occluded. The right coronary artery has about a 95% in-stent restenosis at the origin of the right coronary artery stent. The LIMA to the LAD is patent. Angiography of the left subclavian artery stent is widely patent. IMPRESSION:    1. Mild left ventricular dysfunction with inferobasal hypokinesis, with overall ejection fraction of 40% to 45%. 2.   Occluded LAD. 3.   Patent LIMA to LAD. 4.   Patent stent to the circumflex. 5.   Occluded vein graft to the right. 6.   Occluded vein graft to the circumflex. 7.   A 95% stenosis, in-stent restenosis of the right coronary artery. RECOMMENDATION:  Consider intervention of the right coronary artery.     Dictated By Jadon [place] M.D.  d: 03/30/2022 15:19:02  t: 03/30/2022 23:19:57  [facility] [id number]  [facility]/

## 2022-04-01 LAB
ATRIAL RATE: 70 BPM
P-R INTERVAL: 128 MS
Q-T INTERVAL: 474 MS
QRS DURATION: 106 MS
QTC CALCULATION (BEZET): 511 MS
R AXIS: 26 DEGREES
T AXIS: 66 DEGREES
VENTRICULAR RATE: 70 BPM

## 2023-02-13 ENCOUNTER — HOSPITAL ENCOUNTER (INPATIENT)
Facility: HOSPITAL | Age: 83
LOS: 1 days | Discharge: HOME | End: 2023-02-15
Attending: EMERGENCY MEDICINE | Admitting: INTERNAL MEDICINE
Payer: MEDICARE

## 2023-02-13 ENCOUNTER — APPOINTMENT (OUTPATIENT)
Dept: CV DIAGNOSTICS | Facility: HOSPITAL | Age: 83
End: 2023-02-13
Attending: NURSE PRACTITIONER
Payer: MEDICARE

## 2023-02-13 DIAGNOSIS — I10 ESSENTIAL HYPERTENSION: ICD-10-CM

## 2023-02-13 DIAGNOSIS — I21.4 NSTEMI (NON-ST ELEVATED MYOCARDIAL INFARCTION) (HCC): Primary | ICD-10-CM

## 2023-02-13 DIAGNOSIS — R94.31 ABNORMAL EKG: ICD-10-CM

## 2023-02-13 DIAGNOSIS — R06.09 DOE (DYSPNEA ON EXERTION): ICD-10-CM

## 2023-02-13 LAB
ALBUMIN SERPL-MCNC: 3.6 G/DL (ref 3.4–5)
ALBUMIN/GLOB SERPL: 1 {RATIO} (ref 1–2)
ALP LIVER SERPL-CCNC: 85 U/L
ALT SERPL-CCNC: 30 U/L
ANION GAP SERPL CALC-SCNC: 4 MMOL/L (ref 0–18)
APTT PPP: 139 SECONDS (ref 23.3–35.6)
APTT PPP: 24.6 SECONDS (ref 23.3–35.6)
AST SERPL-CCNC: 30 U/L (ref 15–37)
ATRIAL RATE: 394 BPM
BASOPHILS # BLD AUTO: 0.03 X10(3) UL (ref 0–0.2)
BASOPHILS NFR BLD AUTO: 0.6 %
BILIRUB SERPL-MCNC: 0.8 MG/DL (ref 0.1–2)
BUN BLD-MCNC: 24 MG/DL (ref 7–18)
CALCIUM BLD-MCNC: 9.7 MG/DL (ref 8.5–10.1)
CHLORIDE SERPL-SCNC: 104 MMOL/L (ref 98–112)
CHOLEST SERPL-MCNC: 242 MG/DL (ref ?–200)
CO2 SERPL-SCNC: 32 MMOL/L (ref 21–32)
CREAT BLD-MCNC: 0.95 MG/DL
EOSINOPHIL # BLD AUTO: 0.09 X10(3) UL (ref 0–0.7)
EOSINOPHIL NFR BLD AUTO: 1.8 %
ERYTHROCYTE [DISTWIDTH] IN BLOOD BY AUTOMATED COUNT: 12 %
GFR SERPLBLD BASED ON 1.73 SQ M-ARVRAT: 60 ML/MIN/1.73M2 (ref 60–?)
GLOBULIN PLAS-MCNC: 3.7 G/DL (ref 2.8–4.4)
GLUCOSE BLD-MCNC: 119 MG/DL (ref 70–99)
GLUCOSE BLD-MCNC: 85 MG/DL (ref 70–99)
HCT VFR BLD AUTO: 45.2 %
HDLC SERPL-MCNC: 71 MG/DL (ref 40–59)
HGB BLD-MCNC: 15.4 G/DL
IMM GRANULOCYTES # BLD AUTO: 0.01 X10(3) UL (ref 0–1)
IMM GRANULOCYTES NFR BLD: 0.2 %
INR BLD: 1.03 (ref 0.85–1.16)
LDLC SERPL CALC-MCNC: 159 MG/DL (ref ?–100)
LYMPHOCYTES # BLD AUTO: 1.05 X10(3) UL (ref 1–4)
LYMPHOCYTES NFR BLD AUTO: 20.8 %
MAGNESIUM SERPL-MCNC: 2.1 MG/DL (ref 1.6–2.6)
MCH RBC QN AUTO: 30.7 PG (ref 26–34)
MCHC RBC AUTO-ENTMCNC: 34.1 G/DL (ref 31–37)
MCV RBC AUTO: 90.2 FL
MONOCYTES # BLD AUTO: 0.5 X10(3) UL (ref 0.1–1)
MONOCYTES NFR BLD AUTO: 9.9 %
NEUTROPHILS # BLD AUTO: 3.36 X10 (3) UL (ref 1.5–7.7)
NEUTROPHILS # BLD AUTO: 3.36 X10(3) UL (ref 1.5–7.7)
NEUTROPHILS NFR BLD AUTO: 66.7 %
NONHDLC SERPL-MCNC: 171 MG/DL (ref ?–130)
NT-PROBNP SERPL-MCNC: 3629 PG/ML (ref ?–450)
OSMOLALITY SERPL CALC.SUM OF ELEC: 295 MOSM/KG (ref 275–295)
P-R INTERVAL: 244 MS
PLATELET # BLD AUTO: 145 10(3)UL (ref 150–450)
POTASSIUM SERPL-SCNC: 3.8 MMOL/L (ref 3.5–5.1)
PROT SERPL-MCNC: 7.3 G/DL (ref 6.4–8.2)
PROTHROMBIN TIME: 13.5 SECONDS (ref 11.6–14.8)
Q-T INTERVAL: 394 MS
QRS DURATION: 112 MS
QTC CALCULATION (BEZET): 443 MS
R AXIS: 72 DEGREES
RBC # BLD AUTO: 5.01 X10(6)UL
SARS-COV-2 RNA RESP QL NAA+PROBE: NOT DETECTED
SODIUM SERPL-SCNC: 140 MMOL/L (ref 136–145)
T AXIS: -13 DEGREES
TRIGL SERPL-MCNC: 71 MG/DL (ref 30–149)
TROPONIN I HIGH SENSITIVITY: 730 NG/L
VENTRICULAR RATE: 76 BPM
VLDLC SERPL CALC-MCNC: 14 MG/DL (ref 0–30)
WBC # BLD AUTO: 5 X10(3) UL (ref 4–11)

## 2023-02-13 PROCEDURE — 82962 GLUCOSE BLOOD TEST: CPT

## 2023-02-13 PROCEDURE — 93306 TTE W/DOPPLER COMPLETE: CPT | Performed by: NURSE PRACTITIONER

## 2023-02-13 PROCEDURE — 85730 THROMBOPLASTIN TIME PARTIAL: CPT | Performed by: NURSE PRACTITIONER

## 2023-02-13 PROCEDURE — 96375 TX/PRO/DX INJ NEW DRUG ADDON: CPT

## 2023-02-13 PROCEDURE — 93010 ELECTROCARDIOGRAM REPORT: CPT

## 2023-02-13 PROCEDURE — 99291 CRITICAL CARE FIRST HOUR: CPT

## 2023-02-13 PROCEDURE — 85610 PROTHROMBIN TIME: CPT | Performed by: EMERGENCY MEDICINE

## 2023-02-13 PROCEDURE — 85025 COMPLETE CBC W/AUTO DIFF WBC: CPT

## 2023-02-13 PROCEDURE — 80053 COMPREHEN METABOLIC PANEL: CPT

## 2023-02-13 PROCEDURE — 85730 THROMBOPLASTIN TIME PARTIAL: CPT | Performed by: EMERGENCY MEDICINE

## 2023-02-13 PROCEDURE — 96365 THER/PROPH/DIAG IV INF INIT: CPT

## 2023-02-13 PROCEDURE — 84484 ASSAY OF TROPONIN QUANT: CPT

## 2023-02-13 PROCEDURE — 80061 LIPID PANEL: CPT

## 2023-02-13 PROCEDURE — 83880 ASSAY OF NATRIURETIC PEPTIDE: CPT | Performed by: EMERGENCY MEDICINE

## 2023-02-13 PROCEDURE — 99285 EMERGENCY DEPT VISIT HI MDM: CPT

## 2023-02-13 PROCEDURE — 93005 ELECTROCARDIOGRAM TRACING: CPT

## 2023-02-13 PROCEDURE — 83735 ASSAY OF MAGNESIUM: CPT | Performed by: EMERGENCY MEDICINE

## 2023-02-13 RX ORDER — METOPROLOL SUCCINATE 25 MG/1
25 TABLET, EXTENDED RELEASE ORAL NIGHTLY
Status: DISCONTINUED | OUTPATIENT
Start: 2023-02-13 | End: 2023-02-15

## 2023-02-13 RX ORDER — METOCLOPRAMIDE HYDROCHLORIDE 5 MG/ML
5 INJECTION INTRAMUSCULAR; INTRAVENOUS EVERY 8 HOURS PRN
Status: DISCONTINUED | OUTPATIENT
Start: 2023-02-13 | End: 2023-02-15

## 2023-02-13 RX ORDER — CLOPIDOGREL BISULFATE 75 MG/1
75 TABLET ORAL DAILY
Status: DISCONTINUED | OUTPATIENT
Start: 2023-02-14 | End: 2023-02-15

## 2023-02-13 RX ORDER — ACETAMINOPHEN 500 MG
500 TABLET ORAL EVERY 4 HOURS PRN
Status: DISCONTINUED | OUTPATIENT
Start: 2023-02-13 | End: 2023-02-15

## 2023-02-13 RX ORDER — CLOPIDOGREL BISULFATE 75 MG/1
75 TABLET ORAL DAILY
Status: DISCONTINUED | OUTPATIENT
Start: 2023-02-13 | End: 2023-02-13

## 2023-02-13 RX ORDER — ASPIRIN 81 MG/1
81 TABLET ORAL DAILY
Status: DISCONTINUED | OUTPATIENT
Start: 2023-02-14 | End: 2023-02-15

## 2023-02-13 RX ORDER — LOSARTAN POTASSIUM 25 MG/1
25 TABLET ORAL DAILY
Status: DISCONTINUED | OUTPATIENT
Start: 2023-02-14 | End: 2023-02-15

## 2023-02-13 RX ORDER — HEPARIN SODIUM 1000 [USP'U]/ML
60 INJECTION, SOLUTION INTRAVENOUS; SUBCUTANEOUS ONCE
Status: COMPLETED | OUTPATIENT
Start: 2023-02-13 | End: 2023-02-13

## 2023-02-13 RX ORDER — FLUOXETINE HYDROCHLORIDE 20 MG/1
20 CAPSULE ORAL EVERY MORNING
Status: DISCONTINUED | OUTPATIENT
Start: 2023-02-14 | End: 2023-02-15

## 2023-02-13 RX ORDER — FUROSEMIDE 10 MG/ML
40 INJECTION INTRAMUSCULAR; INTRAVENOUS DAILY
Status: DISCONTINUED | OUTPATIENT
Start: 2023-02-13 | End: 2023-02-15

## 2023-02-13 RX ORDER — ONDANSETRON 2 MG/ML
4 INJECTION INTRAMUSCULAR; INTRAVENOUS EVERY 6 HOURS PRN
Status: DISCONTINUED | OUTPATIENT
Start: 2023-02-13 | End: 2023-02-15

## 2023-02-13 RX ORDER — METOPROLOL SUCCINATE 25 MG/1
25 TABLET, EXTENDED RELEASE ORAL NIGHTLY
COMMUNITY

## 2023-02-13 RX ORDER — SODIUM CHLORIDE 9 MG/ML
INJECTION, SOLUTION INTRAVENOUS
Status: ACTIVE | OUTPATIENT
Start: 2023-02-14 | End: 2023-02-14

## 2023-02-13 RX ORDER — MELATONIN
3 NIGHTLY PRN
Status: DISCONTINUED | OUTPATIENT
Start: 2023-02-13 | End: 2023-02-15

## 2023-02-13 RX ORDER — MULTIVITAMIN/IRON/FOLIC ACID 18MG-0.4MG
125 TABLET ORAL DAILY
Status: DISCONTINUED | OUTPATIENT
Start: 2023-02-14 | End: 2023-02-15

## 2023-02-13 RX ORDER — HEPARIN SODIUM AND DEXTROSE 10000; 5 [USP'U]/100ML; G/100ML
12 INJECTION INTRAVENOUS ONCE
Status: COMPLETED | OUTPATIENT
Start: 2023-02-13 | End: 2023-02-14

## 2023-02-13 RX ORDER — HEPARIN SODIUM AND DEXTROSE 10000; 5 [USP'U]/100ML; G/100ML
INJECTION INTRAVENOUS CONTINUOUS
Status: DISCONTINUED | OUTPATIENT
Start: 2023-02-13 | End: 2023-02-15

## 2023-02-13 RX ORDER — POTASSIUM CHLORIDE 20 MEQ/1
40 TABLET, EXTENDED RELEASE ORAL DAILY
Status: DISCONTINUED | OUTPATIENT
Start: 2023-02-13 | End: 2023-02-15

## 2023-02-13 NOTE — ED INITIAL ASSESSMENT (HPI)
Pt to the ER via walk in d/t SOB and nausea that has been on going since Thursday. Pt states she has had similar symptoms and it was her heart.  \"47 stents, a pace maker\"

## 2023-02-13 NOTE — ED QUICK NOTES
Orders for admission, patient is aware of plan and ready to go upstairs. Any questions, please call ED DELIA Nieto at extension [phone].      Patient Covid vaccination status: Fully vaccinated     COVID Test Ordered in ED: Rapid SARS-CoV-2 by PCR    COVID Suspicion at Admission: Low clinical suspicion for COVID    Running Infusions:  None    Mental Status/LOC at time of transport: a/ox4    Other pertinent information:   CIWA score: N/A   NIH score:  N/A     Elevated troponin  SOB w/ exertion

## 2023-02-13 NOTE — ED PROVIDER NOTES
Briefly reviewed EKG from triage. CAD with multiple stents, occluded grafts. Questionable changes in the high lateral leads. I requested the patient be brought back immediately for further evaluation. ..  Dr. Shawanda Castro made aware

## 2023-02-14 ENCOUNTER — APPOINTMENT (OUTPATIENT)
Dept: INTERVENTIONAL RADIOLOGY/VASCULAR | Facility: HOSPITAL | Age: 83
End: 2023-02-14
Attending: NURSE PRACTITIONER
Payer: MEDICARE

## 2023-02-14 LAB
ANION GAP SERPL CALC-SCNC: 3 MMOL/L (ref 0–18)
APTT PPP: 47.5 SECONDS (ref 23.3–35.6)
ATRIAL RATE: 71 BPM
BASOPHILS # BLD AUTO: 0.05 X10(3) UL (ref 0–0.2)
BASOPHILS NFR BLD AUTO: 0.9 %
BILIRUB UR QL STRIP.AUTO: NEGATIVE
BUN BLD-MCNC: 24 MG/DL (ref 7–18)
CALCIUM BLD-MCNC: 9.8 MG/DL (ref 8.5–10.1)
CHLORIDE SERPL-SCNC: 104 MMOL/L (ref 98–112)
CO2 SERPL-SCNC: 32 MMOL/L (ref 21–32)
CREAT BLD-MCNC: 0.85 MG/DL
EOSINOPHIL # BLD AUTO: 0.1 X10(3) UL (ref 0–0.7)
EOSINOPHIL NFR BLD AUTO: 1.8 %
ERYTHROCYTE [DISTWIDTH] IN BLOOD BY AUTOMATED COUNT: 12 %
GFR SERPLBLD BASED ON 1.73 SQ M-ARVRAT: 68 ML/MIN/1.73M2 (ref 60–?)
GLUCOSE BLD-MCNC: 102 MG/DL (ref 70–99)
GLUCOSE UR STRIP.AUTO-MCNC: NEGATIVE MG/DL
HCT VFR BLD AUTO: 42.5 %
HGB BLD-MCNC: 15 G/DL
IMM GRANULOCYTES # BLD AUTO: 0.01 X10(3) UL (ref 0–1)
IMM GRANULOCYTES NFR BLD: 0.2 %
KETONES UR STRIP.AUTO-MCNC: NEGATIVE MG/DL
LEUKOCYTE ESTERASE UR QL STRIP.AUTO: NEGATIVE
LYMPHOCYTES # BLD AUTO: 1.49 X10(3) UL (ref 1–4)
LYMPHOCYTES NFR BLD AUTO: 27 %
MCH RBC QN AUTO: 31.2 PG (ref 26–34)
MCHC RBC AUTO-ENTMCNC: 35.3 G/DL (ref 31–37)
MCV RBC AUTO: 88.4 FL
MONOCYTES # BLD AUTO: 0.62 X10(3) UL (ref 0.1–1)
MONOCYTES NFR BLD AUTO: 11.2 %
NEUTROPHILS # BLD AUTO: 3.25 X10 (3) UL (ref 1.5–7.7)
NEUTROPHILS # BLD AUTO: 3.25 X10(3) UL (ref 1.5–7.7)
NEUTROPHILS NFR BLD AUTO: 58.9 %
NITRITE UR QL STRIP.AUTO: NEGATIVE
OSMOLALITY SERPL CALC.SUM OF ELEC: 292 MOSM/KG (ref 275–295)
P AXIS: 87 DEGREES
P-R INTERVAL: 242 MS
PH UR STRIP.AUTO: 5.5 [PH] (ref 5–8)
PLATELET # BLD AUTO: 148 10(3)UL (ref 150–450)
PLATELET # BLD AUTO: 148 10(3)UL (ref 150–450)
POTASSIUM SERPL-SCNC: 3.8 MMOL/L (ref 3.5–5.1)
Q-T INTERVAL: 444 MS
QRS DURATION: 110 MS
QTC CALCULATION (BEZET): 482 MS
R AXIS: 61 DEGREES
RBC # BLD AUTO: 4.81 X10(6)UL
RBC #/AREA URNS AUTO: >10 /HPF
RBC #/AREA URNS AUTO: >10 /HPF
SODIUM SERPL-SCNC: 139 MMOL/L (ref 136–145)
SP GR UR STRIP.AUTO: 1.01 (ref 1–1.03)
T AXIS: -25 DEGREES
TROPONIN I HIGH SENSITIVITY: 616 NG/L
UROBILINOGEN UR STRIP.AUTO-MCNC: 0.2 MG/DL
VENTRICULAR RATE: 71 BPM
WBC # BLD AUTO: 5.5 X10(3) UL (ref 4–11)
WBC #/AREA URNS AUTO: >50 /HPF
WBC #/AREA URNS AUTO: >50 /HPF

## 2023-02-14 PROCEDURE — 93459 L HRT ART/GRFT ANGIO: CPT | Performed by: INTERNAL MEDICINE

## 2023-02-14 PROCEDURE — 85730 THROMBOPLASTIN TIME PARTIAL: CPT | Performed by: HOSPITALIST

## 2023-02-14 PROCEDURE — 02C03ZZ EXTIRPATION OF MATTER FROM CORONARY ARTERY, ONE ARTERY, PERCUTANEOUS APPROACH: ICD-10-PCS | Performed by: INTERNAL MEDICINE

## 2023-02-14 PROCEDURE — 81001 URINALYSIS AUTO W/SCOPE: CPT | Performed by: INTERNAL MEDICINE

## 2023-02-14 PROCEDURE — 85347 COAGULATION TIME ACTIVATED: CPT

## 2023-02-14 PROCEDURE — 81015 MICROSCOPIC EXAM OF URINE: CPT | Performed by: INTERNAL MEDICINE

## 2023-02-14 PROCEDURE — B2121ZZ FLUOROSCOPY OF SINGLE CORONARY ARTERY BYPASS GRAFT USING LOW OSMOLAR CONTRAST: ICD-10-PCS | Performed by: INTERNAL MEDICINE

## 2023-02-14 PROCEDURE — 85049 AUTOMATED PLATELET COUNT: CPT | Performed by: NURSE PRACTITIONER

## 2023-02-14 PROCEDURE — 93005 ELECTROCARDIOGRAM TRACING: CPT

## 2023-02-14 PROCEDURE — 4A023N7 MEASUREMENT OF CARDIAC SAMPLING AND PRESSURE, LEFT HEART, PERCUTANEOUS APPROACH: ICD-10-PCS | Performed by: INTERNAL MEDICINE

## 2023-02-14 PROCEDURE — 92978 ENDOLUMINL IVUS OCT C 1ST: CPT | Performed by: INTERNAL MEDICINE

## 2023-02-14 PROCEDURE — 84484 ASSAY OF TROPONIN QUANT: CPT | Performed by: NURSE PRACTITIONER

## 2023-02-14 PROCEDURE — 99152 MOD SED SAME PHYS/QHP 5/>YRS: CPT | Performed by: INTERNAL MEDICINE

## 2023-02-14 PROCEDURE — 027034Z DILATION OF CORONARY ARTERY, ONE ARTERY WITH DRUG-ELUTING INTRALUMINAL DEVICE, PERCUTANEOUS APPROACH: ICD-10-PCS | Performed by: INTERNAL MEDICINE

## 2023-02-14 PROCEDURE — B2151ZZ FLUOROSCOPY OF LEFT HEART USING LOW OSMOLAR CONTRAST: ICD-10-PCS | Performed by: INTERNAL MEDICINE

## 2023-02-14 PROCEDURE — 85025 COMPLETE CBC W/AUTO DIFF WBC: CPT | Performed by: NURSE PRACTITIONER

## 2023-02-14 PROCEDURE — B2111ZZ FLUOROSCOPY OF MULTIPLE CORONARY ARTERIES USING LOW OSMOLAR CONTRAST: ICD-10-PCS | Performed by: INTERNAL MEDICINE

## 2023-02-14 PROCEDURE — 80048 BASIC METABOLIC PNL TOTAL CA: CPT | Performed by: NURSE PRACTITIONER

## 2023-02-14 PROCEDURE — 99153 MOD SED SAME PHYS/QHP EA: CPT | Performed by: INTERNAL MEDICINE

## 2023-02-14 PROCEDURE — 02F03ZZ FRAGMENTATION IN CORONARY ARTERY, ONE ARTERY, PERCUTANEOUS APPROACH: ICD-10-PCS | Performed by: INTERNAL MEDICINE

## 2023-02-14 RX ORDER — MIDAZOLAM HYDROCHLORIDE 1 MG/ML
INJECTION INTRAMUSCULAR; INTRAVENOUS
Status: COMPLETED
Start: 2023-02-14 | End: 2023-02-14

## 2023-02-14 RX ORDER — LIDOCAINE HYDROCHLORIDE 10 MG/ML
INJECTION, SOLUTION EPIDURAL; INFILTRATION; INTRACAUDAL; PERINEURAL
Status: COMPLETED
Start: 2023-02-14 | End: 2023-02-14

## 2023-02-14 RX ORDER — HEPARIN SODIUM 5000 [USP'U]/ML
INJECTION, SOLUTION INTRAVENOUS; SUBCUTANEOUS
Status: COMPLETED
Start: 2023-02-14 | End: 2023-02-14

## 2023-02-14 NOTE — PROGRESS NOTES
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

## 2023-02-14 NOTE — PLAN OF CARE
Received pt at 0730. AxOx4. Room air. Denies pain/SOB. Vitals stable. Apaced on tele. Heparin gtt infusing per ACS protocol. Hematuria consistent w previous description. Please refer to flowsheets for further assessments. Plan to continue heparin gtt, cath today, consents signed, pt prepped. NPO. Plan of care updated with pt, questions answered, verbalized understanding. Safety precautions in place. Instructed to call staff for help. Will continue to monitor. 1410: Pt returned from cath lab. R groin incision clean, dry, intact. Soft w no hematoma. Vitals running per protocol. Pt resting comfortably in bed. Daughter at bedside. Problem: Patient/Family Goals  Goal: Patient/Family Long Term Goal  Description: Patient's Long Term Goal: return home and stay out of hospital    Interventions:  - follow up appts  - medication regimen  - See additional Care Plan goals for specific interventions  Outcome: Progressing  Goal: Patient/Family Short Term Goal  Description: Patient's Short Term Goal: no shortness of breath    Interventions:   - cath lab  - cardiac consult  - See additional Care Plan goals for specific interventions  Outcome: Progressing     Problem: CARDIOVASCULAR - ADULT  Goal: Maintains optimal cardiac output and hemodynamic stability  Description: INTERVENTIONS:  - Monitor vital signs, rhythm, and trends  - Monitor for bleeding, hypotension and signs of decreased cardiac output  - Evaluate effectiveness of vasoactive medications to optimize hemodynamic stability  - Monitor arterial and/or venous puncture sites for bleeding and/or hematoma  - Assess quality of pulses, skin color and temperature  - Assess for signs of decreased coronary artery perfusion - ex.  Angina  - Evaluate fluid balance, assess for edema, trend weights  Outcome: Progressing  Goal: Absence of cardiac arrhythmias or at baseline  Description: INTERVENTIONS:  - Continuous cardiac monitoring, monitor vital signs, obtain 12 lead EKG if indicated  - Evaluate effectiveness of antiarrhythmic and heart rate control medications as ordered  - Initiate emergency measures for life threatening arrhythmias  - Monitor electrolytes and administer replacement therapy as ordered  Outcome: Progressing

## 2023-02-14 NOTE — PLAN OF CARE
Assumed care of patient at 1. Patient alert and oriented x4. A-paced on telemetry. On room air. Declines any pain. Heparin gtt infusing per orders. Plan for angiogram tomorrow. Consent signed, groins shaved and prepped. Patient updated on plan of care. Call light within reach, bed alarm in place. 0030: Urine appears to be red. MD notified. Order obtained for UA. UA sent. Patient also having severe leg cramping. Tylenol given. 0007: UA showing large amounts of blood in urine. MD notified. MD ordered to resume heparin gtt at 500units per hour (instead of 700units per protocol) due to blood in urine and elevated ptt. Problem: Patient/Family Goals  Goal: Patient/Family Long Term Goal  Description: Patient's Long Term Goal: return home and stay out of hospital    Interventions:  - follow up appts  - medication regimen  - See additional Care Plan goals for specific interventions  2/13/2023 2353 by Cesar Andrade RN  Outcome: Progressing    Goal: Patient/Family Short Term Goal  Description: Patient's Short Term Goal: no shortness of breath    Interventions:   - cath lab  - cardiac consult  - See additional Care Plan goals for specific interventions  2/13/2023 2353 by Cesar Andrade RN  Outcome: Progressing       Problem: CARDIOVASCULAR - ADULT  Goal: Maintains optimal cardiac output and hemodynamic stability  Description: INTERVENTIONS:  - Monitor vital signs, rhythm, and trends  - Monitor for bleeding, hypotension and signs of decreased cardiac output  - Evaluate effectiveness of vasoactive medications to optimize hemodynamic stability  - Monitor arterial and/or venous puncture sites for bleeding and/or hematoma  - Assess quality of pulses, skin color and temperature  - Assess for signs of decreased coronary artery perfusion - ex.  Angina  - Evaluate fluid balance, assess for edema, trend weights  Outcome: Progressing  Goal: Absence of cardiac arrhythmias or at baseline  Description: INTERVENTIONS:  - Continuous cardiac monitoring, monitor vital signs, obtain 12 lead EKG if indicated  - Evaluate effectiveness of antiarrhythmic and heart rate control medications as ordered  - Initiate emergency measures for life threatening arrhythmias  - Monitor electrolytes and administer replacement therapy as ordered  Outcome: Progressing

## 2023-02-15 VITALS
RESPIRATION RATE: 18 BRPM | DIASTOLIC BLOOD PRESSURE: 58 MMHG | SYSTOLIC BLOOD PRESSURE: 116 MMHG | OXYGEN SATURATION: 92 % | WEIGHT: 152.13 LBS | BODY MASS INDEX: 28.72 KG/M2 | HEIGHT: 61 IN | TEMPERATURE: 98 F | HEART RATE: 72 BPM

## 2023-02-15 LAB
ANION GAP SERPL CALC-SCNC: 4 MMOL/L (ref 0–18)
BASOPHILS # BLD AUTO: 0.03 X10(3) UL (ref 0–0.2)
BASOPHILS NFR BLD AUTO: 0.4 %
BUN BLD-MCNC: 22 MG/DL (ref 7–18)
CALCIUM BLD-MCNC: 9.5 MG/DL (ref 8.5–10.1)
CHLORIDE SERPL-SCNC: 102 MMOL/L (ref 98–112)
CO2 SERPL-SCNC: 31 MMOL/L (ref 21–32)
CREAT BLD-MCNC: 0.96 MG/DL
EOSINOPHIL # BLD AUTO: 0.19 X10(3) UL (ref 0–0.7)
EOSINOPHIL NFR BLD AUTO: 2.6 %
ERYTHROCYTE [DISTWIDTH] IN BLOOD BY AUTOMATED COUNT: 12.3 %
GFR SERPLBLD BASED ON 1.73 SQ M-ARVRAT: 59 ML/MIN/1.73M2 (ref 60–?)
GLUCOSE BLD-MCNC: 115 MG/DL (ref 70–99)
HCT VFR BLD AUTO: 42.3 %
HGB BLD-MCNC: 14.3 G/DL
IMM GRANULOCYTES # BLD AUTO: 0.02 X10(3) UL (ref 0–1)
IMM GRANULOCYTES NFR BLD: 0.3 %
ISTAT ACTIVATED CLOTTING TIME: 107 SECONDS (ref 74–137)
ISTAT ACTIVATED CLOTTING TIME: 342 SECONDS (ref 74–137)
LYMPHOCYTES # BLD AUTO: 0.64 X10(3) UL (ref 1–4)
LYMPHOCYTES NFR BLD AUTO: 8.9 %
MCH RBC QN AUTO: 30.8 PG (ref 26–34)
MCHC RBC AUTO-ENTMCNC: 33.8 G/DL (ref 31–37)
MCV RBC AUTO: 91.2 FL
MONOCYTES # BLD AUTO: 0.73 X10(3) UL (ref 0.1–1)
MONOCYTES NFR BLD AUTO: 10.1 %
NEUTROPHILS # BLD AUTO: 5.61 X10 (3) UL (ref 1.5–7.7)
NEUTROPHILS # BLD AUTO: 5.61 X10(3) UL (ref 1.5–7.7)
NEUTROPHILS NFR BLD AUTO: 77.7 %
OSMOLALITY SERPL CALC.SUM OF ELEC: 288 MOSM/KG (ref 275–295)
PLATELET # BLD AUTO: 145 10(3)UL (ref 150–450)
PLATELET # BLD AUTO: 145 10(3)UL (ref 150–450)
POTASSIUM SERPL-SCNC: 4.4 MMOL/L (ref 3.5–5.1)
RBC # BLD AUTO: 4.64 X10(6)UL
SODIUM SERPL-SCNC: 137 MMOL/L (ref 136–145)
WBC # BLD AUTO: 7.2 X10(3) UL (ref 4–11)

## 2023-02-15 PROCEDURE — 85025 COMPLETE CBC W/AUTO DIFF WBC: CPT | Performed by: HOSPITALIST

## 2023-02-15 PROCEDURE — 85049 AUTOMATED PLATELET COUNT: CPT | Performed by: NURSE PRACTITIONER

## 2023-02-15 PROCEDURE — 99211 OFF/OP EST MAY X REQ PHY/QHP: CPT

## 2023-02-15 PROCEDURE — 80048 BASIC METABOLIC PNL TOTAL CA: CPT | Performed by: HOSPITALIST

## 2023-02-15 RX ORDER — IRBESARTAN 150 MG/1
75 TABLET ORAL DAILY
Qty: 30 TABLET | Refills: 2 | Status: SHIPPED | OUTPATIENT
Start: 2023-02-15

## 2023-02-15 NOTE — PLAN OF CARE
PT A/O, 94% ON RA, APACED, UP VOIDING IN BATHROOM, URINE LIGHT PICK, DENIES PAIN, RT GROIN DRESSING WITH SCANT OLD DRAINAGE, NO HEMATOMA, LABS IN AM    Problem: CARDIOVASCULAR - ADULT  Goal: Maintains optimal cardiac output and hemodynamic stability  Description: INTERVENTIONS:  - Monitor vital signs, rhythm, and trends  - Monitor for bleeding, hypotension and signs of decreased cardiac output  - Evaluate effectiveness of vasoactive medications to optimize hemodynamic stability  - Monitor arterial and/or venous puncture sites for bleeding and/or hematoma  - Assess quality of pulses, skin color and temperature  - Assess for signs of decreased coronary artery perfusion - ex.  Angina  - Evaluate fluid balance, assess for edema, trend weights  Outcome: Progressing

## 2023-02-15 NOTE — PLAN OF CARE
Assumed patient care at 0730 this Am. Patient A&O x4. SPO2 maintained on RA, no c/o SOB. Received IV lasix this Am. Apaced on tele. Right groin dressing cdi, site without complications. Ambulated in halls, tolerated well. Continent of B&B. Denies pain. Up SBA for safety. Updated on POC, needs met. DC planning. 1130: Explained discharge instructions including medications and follow ups to the patient, verbalize understanding. Printed script for Irbesartan provided to pt. Clarified Lovastatin order with cardiology CRISSY Navarrete- pt does not need to take- med rec updated, AVS manually updated and reviewed with pt- verbalzed understanding. Reviewed incision site care/precautions, verbalized understanding. PIV removed, tele monitor discontinued, will be transported via wheelchair to [facility] home with yuli. Problem: CARDIOVASCULAR - ADULT  Goal: Maintains optimal cardiac output and hemodynamic stability  Description: INTERVENTIONS:  - Monitor vital signs, rhythm, and trends  - Monitor for bleeding, hypotension and signs of decreased cardiac output  - Evaluate effectiveness of vasoactive medications to optimize hemodynamic stability  - Monitor arterial and/or venous puncture sites for bleeding and/or hematoma  - Assess quality of pulses, skin color and temperature  - Assess for signs of decreased coronary artery perfusion - ex.  Angina  - Evaluate fluid balance, assess for edema, trend weights  Outcome: Adequate for Discharge  Goal: Absence of cardiac arrhythmias or at baseline  Description: INTERVENTIONS:  - Continuous cardiac monitoring, monitor vital signs, obtain 12 lead EKG if indicated  - Evaluate effectiveness of antiarrhythmic and heart rate control medications as ordered  - Initiate emergency measures for life threatening arrhythmias  - Monitor electrolytes and administer replacement therapy as ordered  Outcome: Adequate for Discharge

## 2023-02-15 NOTE — CARDIAC REHAB
Cardiac rehab received consult for CAD education. Education completed with patient. Stent card given to patient. Phase 2 offered and explained to patient, she will call if interested. She may go closer to home.

## 2023-03-27 ENCOUNTER — APPOINTMENT (OUTPATIENT)
Dept: GENERAL RADIOLOGY | Facility: HOSPITAL | Age: 83
End: 2023-03-27
Attending: EMERGENCY MEDICINE
Payer: MEDICARE

## 2023-03-27 ENCOUNTER — HOSPITAL ENCOUNTER (OUTPATIENT)
Facility: HOSPITAL | Age: 83
Setting detail: OBSERVATION
Discharge: HOME | End: 2023-03-29
Attending: EMERGENCY MEDICINE | Admitting: HOSPITALIST
Payer: MEDICARE

## 2023-03-27 DIAGNOSIS — R06.09 DOE (DYSPNEA ON EXERTION): Primary | ICD-10-CM

## 2023-03-27 LAB
ALBUMIN SERPL-MCNC: 3.7 G/DL (ref 3.4–5)
ALBUMIN/GLOB SERPL: 1.1 {RATIO} (ref 1–2)
ALP LIVER SERPL-CCNC: 101 U/L
ALT SERPL-CCNC: 46 U/L
ANION GAP SERPL CALC-SCNC: 4 MMOL/L (ref 0–18)
AST SERPL-CCNC: 47 U/L (ref 15–37)
BASOPHILS # BLD AUTO: 0.05 X10(3) UL (ref 0–0.2)
BASOPHILS NFR BLD AUTO: 0.9 %
BILIRUB SERPL-MCNC: 0.9 MG/DL (ref 0.1–2)
BUN BLD-MCNC: 25 MG/DL (ref 7–18)
CALCIUM BLD-MCNC: 9.7 MG/DL (ref 8.5–10.1)
CHLORIDE SERPL-SCNC: 107 MMOL/L (ref 98–112)
CO2 SERPL-SCNC: 30 MMOL/L (ref 21–32)
CREAT BLD-MCNC: 0.99 MG/DL
EOSINOPHIL # BLD AUTO: 0.12 X10(3) UL (ref 0–0.7)
EOSINOPHIL NFR BLD AUTO: 2.2 %
ERYTHROCYTE [DISTWIDTH] IN BLOOD BY AUTOMATED COUNT: 12.5 %
GFR SERPLBLD BASED ON 1.73 SQ M-ARVRAT: 57 ML/MIN/1.73M2 (ref 60–?)
GLOBULIN PLAS-MCNC: 3.4 G/DL (ref 2.8–4.4)
GLUCOSE BLD-MCNC: 138 MG/DL (ref 70–99)
HCT VFR BLD AUTO: 40.7 %
HGB BLD-MCNC: 14 G/DL
IMM GRANULOCYTES # BLD AUTO: 0.01 X10(3) UL (ref 0–1)
IMM GRANULOCYTES NFR BLD: 0.2 %
LYMPHOCYTES # BLD AUTO: 1.06 X10(3) UL (ref 1–4)
LYMPHOCYTES NFR BLD AUTO: 19.7 %
MCH RBC QN AUTO: 31.3 PG (ref 26–34)
MCHC RBC AUTO-ENTMCNC: 34.4 G/DL (ref 31–37)
MCV RBC AUTO: 90.8 FL
MONOCYTES # BLD AUTO: 0.51 X10(3) UL (ref 0.1–1)
MONOCYTES NFR BLD AUTO: 9.5 %
NEUTROPHILS # BLD AUTO: 3.64 X10 (3) UL (ref 1.5–7.7)
NEUTROPHILS # BLD AUTO: 3.64 X10(3) UL (ref 1.5–7.7)
NEUTROPHILS NFR BLD AUTO: 67.5 %
OSMOLALITY SERPL CALC.SUM OF ELEC: 299 MOSM/KG (ref 275–295)
PLATELET # BLD AUTO: 143 10(3)UL (ref 150–450)
POTASSIUM SERPL-SCNC: 4.1 MMOL/L (ref 3.5–5.1)
PROT SERPL-MCNC: 7.1 G/DL (ref 6.4–8.2)
RBC # BLD AUTO: 4.48 X10(6)UL
SARS-COV-2 RNA RESP QL NAA+PROBE: NOT DETECTED
SODIUM SERPL-SCNC: 141 MMOL/L (ref 136–145)
TROPONIN I HIGH SENSITIVITY: 16 NG/L
WBC # BLD AUTO: 5.4 X10(3) UL (ref 4–11)

## 2023-03-27 PROCEDURE — 84484 ASSAY OF TROPONIN QUANT: CPT | Performed by: EMERGENCY MEDICINE

## 2023-03-27 PROCEDURE — 85025 COMPLETE CBC W/AUTO DIFF WBC: CPT | Performed by: EMERGENCY MEDICINE

## 2023-03-27 PROCEDURE — 80053 COMPREHEN METABOLIC PANEL: CPT | Performed by: EMERGENCY MEDICINE

## 2023-03-27 PROCEDURE — 96372 THER/PROPH/DIAG INJ SC/IM: CPT

## 2023-03-27 PROCEDURE — 85025 COMPLETE CBC W/AUTO DIFF WBC: CPT

## 2023-03-27 PROCEDURE — 93005 ELECTROCARDIOGRAM TRACING: CPT

## 2023-03-27 PROCEDURE — 36415 COLL VENOUS BLD VENIPUNCTURE: CPT

## 2023-03-27 PROCEDURE — 84484 ASSAY OF TROPONIN QUANT: CPT

## 2023-03-27 PROCEDURE — 99285 EMERGENCY DEPT VISIT HI MDM: CPT

## 2023-03-27 PROCEDURE — 80053 COMPREHEN METABOLIC PANEL: CPT

## 2023-03-27 PROCEDURE — 71045 X-RAY EXAM CHEST 1 VIEW: CPT | Performed by: EMERGENCY MEDICINE

## 2023-03-27 PROCEDURE — 93010 ELECTROCARDIOGRAM REPORT: CPT

## 2023-03-27 RX ORDER — FLUOXETINE HYDROCHLORIDE 20 MG/1
20 CAPSULE ORAL EVERY MORNING
Status: DISCONTINUED | OUTPATIENT
Start: 2023-03-28 | End: 2023-03-29

## 2023-03-27 RX ORDER — BISACODYL 10 MG
10 SUPPOSITORY, RECTAL RECTAL
Status: DISCONTINUED | OUTPATIENT
Start: 2023-03-27 | End: 2023-03-29

## 2023-03-27 RX ORDER — SENNOSIDES 8.6 MG
17.2 TABLET ORAL NIGHTLY PRN
Status: DISCONTINUED | OUTPATIENT
Start: 2023-03-27 | End: 2023-03-29

## 2023-03-27 RX ORDER — ONDANSETRON 2 MG/ML
4 INJECTION INTRAMUSCULAR; INTRAVENOUS EVERY 6 HOURS PRN
Status: DISCONTINUED | OUTPATIENT
Start: 2023-03-27 | End: 2023-03-29

## 2023-03-27 RX ORDER — ASCORBIC ACID 500 MG
500 TABLET ORAL DAILY
Status: DISCONTINUED | OUTPATIENT
Start: 2023-03-28 | End: 2023-03-29

## 2023-03-27 RX ORDER — MELATONIN
1000 DAILY
Status: DISCONTINUED | OUTPATIENT
Start: 2023-03-28 | End: 2023-03-29

## 2023-03-27 RX ORDER — ACETAMINOPHEN 500 MG
500 TABLET ORAL EVERY 4 HOURS PRN
Status: DISCONTINUED | OUTPATIENT
Start: 2023-03-27 | End: 2023-03-29

## 2023-03-27 RX ORDER — POLYETHYLENE GLYCOL 3350 17 G/17G
17 POWDER, FOR SOLUTION ORAL DAILY PRN
Status: DISCONTINUED | OUTPATIENT
Start: 2023-03-27 | End: 2023-03-29

## 2023-03-27 RX ORDER — CLOPIDOGREL BISULFATE 75 MG/1
75 TABLET ORAL DAILY
Status: DISCONTINUED | OUTPATIENT
Start: 2023-03-28 | End: 2023-03-29

## 2023-03-27 RX ORDER — ASPIRIN 81 MG/1
81 TABLET, CHEWABLE ORAL DAILY
Status: DISCONTINUED | OUTPATIENT
Start: 2023-03-28 | End: 2023-03-29

## 2023-03-27 RX ORDER — HEPARIN SODIUM 5000 [USP'U]/ML
5000 INJECTION, SOLUTION INTRAVENOUS; SUBCUTANEOUS EVERY 8 HOURS SCHEDULED
Status: DISCONTINUED | OUTPATIENT
Start: 2023-03-27 | End: 2023-03-29

## 2023-03-27 RX ORDER — METOCLOPRAMIDE HYDROCHLORIDE 5 MG/ML
5 INJECTION INTRAMUSCULAR; INTRAVENOUS EVERY 8 HOURS PRN
Status: DISCONTINUED | OUTPATIENT
Start: 2023-03-27 | End: 2023-03-29

## 2023-03-27 RX ORDER — LOSARTAN POTASSIUM 25 MG/1
25 TABLET ORAL DAILY
Status: DISCONTINUED | OUTPATIENT
Start: 2023-03-28 | End: 2023-03-29

## 2023-03-27 RX ORDER — METOPROLOL SUCCINATE 25 MG/1
25 TABLET, EXTENDED RELEASE ORAL NIGHTLY
Status: DISCONTINUED | OUTPATIENT
Start: 2023-03-27 | End: 2023-03-29

## 2023-03-27 NOTE — ED INITIAL ASSESSMENT (HPI)
Pt reports shortness of breath intermittently x 1 week. Pt reports shortness of breath gets worse with movement. Pt also reports chest heaviness. Pt is a&ox4 and ambulatory.

## 2023-03-28 ENCOUNTER — APPOINTMENT (OUTPATIENT)
Dept: INTERVENTIONAL RADIOLOGY/VASCULAR | Facility: HOSPITAL | Age: 83
End: 2023-03-28
Attending: NURSE PRACTITIONER
Payer: MEDICARE

## 2023-03-28 LAB
ALBUMIN SERPL-MCNC: 3.2 G/DL (ref 3.4–5)
ALBUMIN/GLOB SERPL: 1 {RATIO} (ref 1–2)
ALP LIVER SERPL-CCNC: 82 U/L
ALT SERPL-CCNC: 40 U/L
ANION GAP SERPL CALC-SCNC: 3 MMOL/L (ref 0–18)
AST SERPL-CCNC: 36 U/L (ref 15–37)
ATRIAL RATE: 75 BPM
BASOPHILS # BLD AUTO: 0.05 X10(3) UL (ref 0–0.2)
BASOPHILS NFR BLD AUTO: 1.1 %
BILIRUB SERPL-MCNC: 1.1 MG/DL (ref 0.1–2)
BUN BLD-MCNC: 23 MG/DL (ref 7–18)
CALCIUM BLD-MCNC: 8.9 MG/DL (ref 8.5–10.1)
CHLORIDE SERPL-SCNC: 108 MMOL/L (ref 98–112)
CHOLEST SERPL-MCNC: 205 MG/DL (ref ?–200)
CO2 SERPL-SCNC: 30 MMOL/L (ref 21–32)
CREAT BLD-MCNC: 0.85 MG/DL
EOSINOPHIL # BLD AUTO: 0.15 X10(3) UL (ref 0–0.7)
EOSINOPHIL NFR BLD AUTO: 3.3 %
ERYTHROCYTE [DISTWIDTH] IN BLOOD BY AUTOMATED COUNT: 12.6 %
GFR SERPLBLD BASED ON 1.73 SQ M-ARVRAT: 68 ML/MIN/1.73M2 (ref 60–?)
GLOBULIN PLAS-MCNC: 3.1 G/DL (ref 2.8–4.4)
GLUCOSE BLD-MCNC: 96 MG/DL (ref 70–99)
HCT VFR BLD AUTO: 36.8 %
HDLC SERPL-MCNC: 69 MG/DL (ref 40–59)
HGB BLD-MCNC: 12.4 G/DL
IMM GRANULOCYTES # BLD AUTO: 0.02 X10(3) UL (ref 0–1)
IMM GRANULOCYTES NFR BLD: 0.4 %
LDLC SERPL CALC-MCNC: 122 MG/DL (ref ?–100)
LYMPHOCYTES # BLD AUTO: 1.41 X10(3) UL (ref 1–4)
LYMPHOCYTES NFR BLD AUTO: 30.7 %
MCH RBC QN AUTO: 30.7 PG (ref 26–34)
MCHC RBC AUTO-ENTMCNC: 33.7 G/DL (ref 31–37)
MCV RBC AUTO: 91.1 FL
MONOCYTES # BLD AUTO: 0.54 X10(3) UL (ref 0.1–1)
MONOCYTES NFR BLD AUTO: 11.8 %
NEUTROPHILS # BLD AUTO: 2.42 X10 (3) UL (ref 1.5–7.7)
NEUTROPHILS # BLD AUTO: 2.42 X10(3) UL (ref 1.5–7.7)
NEUTROPHILS NFR BLD AUTO: 52.7 %
NONHDLC SERPL-MCNC: 136 MG/DL (ref ?–130)
NT-PROBNP SERPL-MCNC: 3178 PG/ML (ref ?–450)
OSMOLALITY SERPL CALC.SUM OF ELEC: 296 MOSM/KG (ref 275–295)
PLATELET # BLD AUTO: 131 10(3)UL (ref 150–450)
POTASSIUM SERPL-SCNC: 3.7 MMOL/L (ref 3.5–5.1)
PROT SERPL-MCNC: 6.3 G/DL (ref 6.4–8.2)
Q-T INTERVAL: 408 MS
QRS DURATION: 110 MS
QTC CALCULATION (BEZET): 461 MS
R AXIS: 30 DEGREES
RBC # BLD AUTO: 4.04 X10(6)UL
SODIUM SERPL-SCNC: 141 MMOL/L (ref 136–145)
T AXIS: -10 DEGREES
TRIGL SERPL-MCNC: 79 MG/DL (ref 30–149)
TROPONIN I HIGH SENSITIVITY: 22 NG/L
VENTRICULAR RATE: 77 BPM
VLDLC SERPL CALC-MCNC: 14 MG/DL (ref 0–30)
WBC # BLD AUTO: 4.6 X10(3) UL (ref 4–11)

## 2023-03-28 PROCEDURE — 80053 COMPREHEN METABOLIC PANEL: CPT | Performed by: HOSPITALIST

## 2023-03-28 PROCEDURE — B2111ZZ FLUOROSCOPY OF MULTIPLE CORONARY ARTERIES USING LOW OSMOLAR CONTRAST: ICD-10-PCS | Performed by: INTERNAL MEDICINE

## 2023-03-28 PROCEDURE — 4A023N8 MEASUREMENT OF CARDIAC SAMPLING AND PRESSURE, BILATERAL, PERCUTANEOUS APPROACH: ICD-10-PCS | Performed by: INTERNAL MEDICINE

## 2023-03-28 PROCEDURE — 99152 MOD SED SAME PHYS/QHP 5/>YRS: CPT | Performed by: INTERNAL MEDICINE

## 2023-03-28 PROCEDURE — 93460 R&L HRT ART/VENTRICLE ANGIO: CPT | Performed by: INTERNAL MEDICINE

## 2023-03-28 PROCEDURE — 83880 ASSAY OF NATRIURETIC PEPTIDE: CPT | Performed by: HOSPITALIST

## 2023-03-28 PROCEDURE — 84484 ASSAY OF TROPONIN QUANT: CPT | Performed by: HOSPITALIST

## 2023-03-28 PROCEDURE — 93461 R&L HRT ART/VENTRICLE ANGIO: CPT | Performed by: INTERNAL MEDICINE

## 2023-03-28 PROCEDURE — 85347 COAGULATION TIME ACTIVATED: CPT

## 2023-03-28 PROCEDURE — B2161ZZ FLUOROSCOPY OF RIGHT AND LEFT HEART USING LOW OSMOLAR CONTRAST: ICD-10-PCS | Performed by: INTERNAL MEDICINE

## 2023-03-28 PROCEDURE — B2121ZZ FLUOROSCOPY OF SINGLE CORONARY ARTERY BYPASS GRAFT USING LOW OSMOLAR CONTRAST: ICD-10-PCS | Performed by: INTERNAL MEDICINE

## 2023-03-28 PROCEDURE — 80061 LIPID PANEL: CPT | Performed by: HOSPITALIST

## 2023-03-28 PROCEDURE — 85025 COMPLETE CBC W/AUTO DIFF WBC: CPT | Performed by: HOSPITALIST

## 2023-03-28 PROCEDURE — 96372 THER/PROPH/DIAG INJ SC/IM: CPT

## 2023-03-28 RX ORDER — LIDOCAINE HYDROCHLORIDE 10 MG/ML
INJECTION, SOLUTION EPIDURAL; INFILTRATION; INTRACAUDAL; PERINEURAL
Status: COMPLETED
Start: 2023-03-28 | End: 2023-03-28

## 2023-03-28 RX ORDER — HEPARIN SODIUM 5000 [USP'U]/ML
INJECTION, SOLUTION INTRAVENOUS; SUBCUTANEOUS
Status: COMPLETED
Start: 2023-03-28 | End: 2023-03-28

## 2023-03-28 RX ORDER — SODIUM CHLORIDE 9 MG/ML
INJECTION, SOLUTION INTRAVENOUS
Status: DISCONTINUED | OUTPATIENT
Start: 2023-03-29 | End: 2023-03-28 | Stop reason: HOSPADM

## 2023-03-28 RX ORDER — MIDAZOLAM HYDROCHLORIDE 1 MG/ML
INJECTION INTRAMUSCULAR; INTRAVENOUS
Status: COMPLETED
Start: 2023-03-28 | End: 2023-03-28

## 2023-03-28 NOTE — PROGRESS NOTES
79 y/o female with HTN, DM, CABG, stent RCA,   EF 45%, ICD with progressive exertional SOB significantly worse in last 1-2 weeks. Cathl today. If corns ok then presume HFpEF.  Has statin intolerance, on Repatha

## 2023-03-28 NOTE — PLAN OF CARE
Patient is A&O x4. Currently on room air. Continent of bowel and bladder. Currently A paced on the monitor. Went for CATH today. Held losartan per pre-cath orders.

## 2023-03-28 NOTE — PLAN OF CARE
Pt admitted overnight. On room air. Says she feels short of breath with longer walks. O2 sats wnl. Denies chest pain. NPO. All  needs met at this time. Problem: Patient/Family Goals  Goal: Patient/Family Long Term Goal  Description: Patient's Long Term Goal: \"to stay out of the hospital\"    Interventions:  - Comply with medication regimen  - Follow up with cardiology appointments  - Eat a heart healthy diet    - See additional Care Plan goals for specific interventions  Outcome: Progressing  Goal: Patient/Family Short Term Goal  Description: Patient's Short Term Goal: \"to go home\"    Interventions:   - Cardiology consult and plan of care  - q4 pain assessments  - See additional Care Plan goals for specific interventions  Outcome: Progressing     Problem: CARDIOVASCULAR - ADULT  Goal: Maintains optimal cardiac output and hemodynamic stability  Description: INTERVENTIONS:  - Monitor vital signs, rhythm, and trends  - Monitor for bleeding, hypotension and signs of decreased cardiac output  - Evaluate effectiveness of vasoactive medications to optimize hemodynamic stability  - Monitor arterial and/or venous puncture sites for bleeding and/or hematoma  - Assess quality of pulses, skin color and temperature  - Assess for signs of decreased coronary artery perfusion - ex.  Angina  - Evaluate fluid balance, assess for edema, trend weights  Outcome: Progressing  Goal: Absence of cardiac arrhythmias or at baseline  Description: INTERVENTIONS:  - Continuous cardiac monitoring, monitor vital signs, obtain 12 lead EKG if indicated  - Evaluate effectiveness of antiarrhythmic and heart rate control medications as ordered  - Initiate emergency measures for life threatening arrhythmias  - Monitor electrolytes and administer replacement therapy as ordered  Outcome: Progressing

## 2023-03-28 NOTE — ED QUICK NOTES
Orders for admission, patient is aware of plan and ready to go upstairs. Any questions, please call ED RN yoli at extension [phone].      Patient Covid vaccination status: Fully vaccinated     COVID Test Ordered in ED: Rapid SARS-CoV-2 by PCR    COVID Suspicion at Admission: Low clinical suspicion for COVID    Running Infusions:  None    Mental Status/LOC at time of transport: aox4    Other pertinent information:   CIWA score: N/A   NIH score:  N/A

## 2023-03-28 NOTE — PROGRESS NOTES
EF35% with inf basal aneurysm  LAD 1000%  Circ stent patent  RCA stent patent  Grafts occluded  LIMA patent  Left subclavian stent patent    [facility]  PA78/37 [id number]  Ao sat 97.4  PA sat 65.1    Change losartan to entresto  Start lasix  HF service to see

## 2023-03-29 ENCOUNTER — APPOINTMENT (OUTPATIENT)
Dept: ULTRASOUND IMAGING | Facility: HOSPITAL | Age: 83
End: 2023-03-29
Attending: HOSPITALIST
Payer: MEDICARE

## 2023-03-29 VITALS
OXYGEN SATURATION: 94 % | SYSTOLIC BLOOD PRESSURE: 131 MMHG | HEIGHT: 61 IN | BODY MASS INDEX: 30.3 KG/M2 | RESPIRATION RATE: 18 BRPM | HEART RATE: 74 BPM | DIASTOLIC BLOOD PRESSURE: 38 MMHG | WEIGHT: 160.5 LBS | TEMPERATURE: 98 F

## 2023-03-29 LAB — POTASSIUM SERPL-SCNC: 3.8 MMOL/L (ref 3.5–5.1)

## 2023-03-29 PROCEDURE — 96374 THER/PROPH/DIAG INJ IV PUSH: CPT

## 2023-03-29 PROCEDURE — 76882 US LMTD JT/FCL EVL NVASC XTR: CPT | Performed by: HOSPITALIST

## 2023-03-29 PROCEDURE — 84132 ASSAY OF SERUM POTASSIUM: CPT | Performed by: HOSPITALIST

## 2023-03-29 PROCEDURE — 96372 THER/PROPH/DIAG INJ SC/IM: CPT

## 2023-03-29 RX ORDER — FUROSEMIDE 10 MG/ML
40 INJECTION INTRAMUSCULAR; INTRAVENOUS ONCE
Status: COMPLETED | OUTPATIENT
Start: 2023-03-29 | End: 2023-03-29

## 2023-03-29 RX ORDER — SACUBITRIL AND VALSARTAN 24; 26 MG/1; MG/1
1 TABLET, FILM COATED ORAL 2 TIMES DAILY
Qty: 60 TABLET | Refills: 3 | Status: SHIPPED | OUTPATIENT
Start: 2023-03-29

## 2023-03-29 NOTE — PLAN OF CARE
Around 17:00 pt received from cath lab. R arterial and venous sheath in place. R groin c/d/i, soft. . APN paged for orders. 1930: arterial sheath removed. @ 1940: venous sheath removed. Manual pressure held until 19:50. Hemostasis achieved. Pt tolerated.

## 2023-03-29 NOTE — PLAN OF CARE
Patient alert and oriented x4 on room air , s/p  cardiac cath procedure , RT groin incision site intact , no hematoma, or bleeding , early night slight bleeding noted around the catheter , IS  provided , 750 only reached an encouraged ,voided and ambulated on the hallway, plan of care updated made comfortable, Us of Rt upper arm in am , no acute distress noted. Will continue to monitor closely    Problem: Patient/Family Goals  Goal: Patient/Family Long Term Goal  Description: Patient's Long Term Goal: \"to stay out of the hospital\"    Interventions:  - Comply with medication regimen  - Follow up with cardiology appointments  - Eat a heart healthy diet    - See additional Care Plan goals for specific interventions  Outcome: Progressing  Goal: Patient/Family Short Term Goal  Description: Patient's Short Term Goal: \"to go home\"    Interventions:   - Cardiology consult and plan of care  - q4 pain assessments  - See additional Care Plan goals for specific interventions  Outcome: Progressing     Problem: CARDIOVASCULAR - ADULT  Goal: Maintains optimal cardiac output and hemodynamic stability  Description: INTERVENTIONS:  - Monitor vital signs, rhythm, and trends  - Monitor for bleeding, hypotension and signs of decreased cardiac output  - Evaluate effectiveness of vasoactive medications to optimize hemodynamic stability  - Monitor arterial and/or venous puncture sites for bleeding and/or hematoma  - Assess quality of pulses, skin color and temperature  - Assess for signs of decreased coronary artery perfusion - ex.  Angina  - Evaluate fluid balance, assess for edema, trend weights  Outcome: Progressing  Goal: Absence of cardiac arrhythmias or at baseline  Description: INTERVENTIONS:  - Continuous cardiac monitoring, monitor vital signs, obtain 12 lead EKG if indicated  - Evaluate effectiveness of antiarrhythmic and heart rate control medications as ordered  - Initiate emergency measures for life threatening arrhythmias  - Monitor electrolytes and administer replacement therapy as ordered  Outcome: Progressing

## 2023-03-29 NOTE — PLAN OF CARE
Shift Note:  Assumed care of patient. Patient alert and oriented x4, resting in bed comfortable, pleasant. Spoke the patient this morning about code status and advance directives paperwork; per patient she has her son as POA, this RN added to contact list, and also has a filled living will. Does not recall what code status option, after discussion, patient prefers to be DNAR/Comfort. Hosp updated on code status; Patient wishes to hold on filling a POLST form until she is sure of what current paperwork lists. Patient on room air, denies difficulty breathing, lung sounds clear. Denies any cardiac symptoms, A-Paced with HR in 70s, on tele. Denies pain at this time, no further chest discomfort thus far. Continent of bowel and bladder. R Groin from [facility] appears C/D/I, soft with no hematoma, patient denies any tingling or numbness to extremity. Ambulates with stand by assist and a walker, from home alone with supportive family for resources, call light within reach, tolerating care well. POC:  - Jb Donovan today   - HF coordinator to see   - Hugo Alvarado ?

## 2023-03-29 NOTE — PROGRESS NOTES
Discharge Note:     Patient tele discontinued, IV discontinued with catheter intact. Patient discharge instructions reviewed with patient and daughter, verbalize understanding. This RN discussed extensively on HF guideline, patient was given binder. New prescription brought to patient via meds to beds, endorsed understanding to hold irbesartan for 3 days and then start Entresto. Patient escorted via wheelchair to the [facility] by this RN.

## 2023-03-30 ENCOUNTER — PATIENT OUTREACH (OUTPATIENT)
Dept: CASE MANAGEMENT | Age: 83
End: 2023-03-30

## 2023-03-30 NOTE — PROGRESS NOTES
VM received; pt requesting assistance w/confirming apts (dc 03/29)    Dr Gus Pena & Dr Sanchez Nicole  [address] [place] [id number] [phone]

## 2023-04-03 LAB
ISTAT ACTIVATED CLOTTING TIME: 155 SECONDS (ref 74–137)
ISTAT ACTIVATED CLOTTING TIME: 191 SECONDS (ref 74–137)

## 2023-04-13 ENCOUNTER — LAB ENCOUNTER (OUTPATIENT)
Dept: LAB | Age: 83
End: 2023-04-13
Attending: INTERNAL MEDICINE
Payer: MEDICARE

## 2023-04-13 DIAGNOSIS — I25.5 ISCHEMIC CARDIOMYOPATHY: ICD-10-CM

## 2023-04-13 DIAGNOSIS — R06.09 DOE (DYSPNEA ON EXERTION): Primary | ICD-10-CM

## 2023-04-13 DIAGNOSIS — N18.30 CHRONIC KIDNEY DISEASE (CKD), STAGE III (MODERATE) (HCC): ICD-10-CM

## 2023-04-13 DIAGNOSIS — I10 ESSENTIAL HYPERTENSION: ICD-10-CM

## 2023-04-13 DIAGNOSIS — E78.00 PURE HYPERCHOLESTEROLEMIA: ICD-10-CM

## 2023-04-13 LAB
ANION GAP SERPL CALC-SCNC: 6 MMOL/L (ref 0–18)
BUN BLD-MCNC: 24 MG/DL (ref 7–18)
BUN/CREAT SERPL: 23.5 (ref 10–20)
CALCIUM BLD-MCNC: 9.6 MG/DL (ref 8.5–10.1)
CHLORIDE SERPL-SCNC: 107 MMOL/L (ref 98–112)
CO2 SERPL-SCNC: 30 MMOL/L (ref 21–32)
CREAT BLD-MCNC: 1.02 MG/DL
FASTING STATUS PATIENT QL REPORTED: NO
GFR SERPLBLD BASED ON 1.73 SQ M-ARVRAT: 55 ML/MIN/1.73M2 (ref 60–?)
GLUCOSE BLD-MCNC: 167 MG/DL (ref 70–99)
OSMOLALITY SERPL CALC.SUM OF ELEC: 304 MOSM/KG (ref 275–295)
POTASSIUM SERPL-SCNC: 4.7 MMOL/L (ref 3.5–5.1)
SODIUM SERPL-SCNC: 143 MMOL/L (ref 136–145)

## 2023-04-13 PROCEDURE — 80048 BASIC METABOLIC PNL TOTAL CA: CPT

## 2023-04-13 PROCEDURE — 36415 COLL VENOUS BLD VENIPUNCTURE: CPT

## 2023-04-13 NOTE — CONSULTS
[address]    PATIENT'S NAME: Kirby Redmond   ATTENDING PHYSICIAN: Gloria Martinez. Sara Marroquin MD   CONSULTING PHYSICIAN: Jaylene Randall M.D. PATIENT ACCOUNT#:   [de-identified]    LOCATION:  [address] [place] [address]  MEDICAL RECORD #:   [id number]       YOB: 1940  ADMISSION DATE:       03/27/2023      CONSULT DATE:  03/28/2023    REPORT OF CONSULTATION    HISTORY OF PRESENT ILLNESS:  The patient is an 80-year-old female with hypertension, diabetes, bypass surgery, and prior stent to right coronary artery. Patient had a prior ejection fraction of 45%. Patient has known ICD. Patient complained of progressive exertion and shortness of breath, worse in the last 1 to 2 weeks. She denies any syncope. This is clearly a difference compared to how she felt before. Patient has primary biliary hepatitis with cirrhosis. PAST MEDICAL HISTORY:  Hepatitis C. Cirrhosis. ICD. Bypass surgery, ejection fraction 45%. Stenting of the right coronary artery. Hypertension. Dyslipidemia. Thrombocytopenia. REVIEW OF SYSTEMS:  Denies hemoptysis. Denies hematemesis. Denies hematuria. Rest of review of systems is normal except as in HPI. PHYSICAL EXAMINATION:    VITAL SIGNS:  Blood pressure 130/70, pulse 70. HEENT:  Pupils equal and reactive to light and accommodation. LUNGS:  Clear. HEART:  S1, S2. No S3. Soft systolic murmur. EXTREMITIES:  No edema. NEUROLOGIC:  Alert and oriented to person, place, and time. IMPRESSION:    1. Exertional dyspnea. Patient with prior bypass surgery with prior normal ejection fraction. At the present time, patient should have an echo. We will probably proceed with cardiac catheterization to rule out progression of ischemia. If coronaries are normal, we may have to presume heart failure with preserved ejection fraction. 2.   Statin intolerance on Repatha. 3.   History of hepatitis C with cirrhosis. 4.   Hypertension. 5.   Diabetes.   6.   History of stenting of the right coronary artery.      Dictated By Lois Dewey M.D.  d: 04/12/2023 16:19:57  t: 04/12/2023 18:09:09  [facility] [id number]  [facility]/

## 2023-04-13 NOTE — PROCEDURES
[address]    PATIENT'S NAME: Ale Fraga   ATTENDING PHYSICIAN: Cleo Melendrez MD   OPERATING PHYSICIAN: Allegra Wong M.D. PATIENT ACCOUNT#:   [de-identified]    LOCATION:  [address] [place] [address]  MEDICAL RECORD #:   [id number]       YOB: 1940  ADMISSION DATE:       03/27/2023      OPERATION DATE:  03/28/2023    CARDIAC PROCEDURE TRANSCRIPTION    CARDIAC CATHETERIZATION    PREOPERATIVE DIAGNOSIS:    POSTOPERATIVE DIAGNOSIS:    PROCEDURE PERFORMED:      INDICATIONS:  An 80-year-old female with bypass surgery, progressive shortness of breath, cirrhosis, hypertension, diabetes. DESCRIPTION OF PROCEDURE:  Right groin was anesthetized, a 6-Gambian sheath was introduced. Ejection fraction 35% with inferobasal aneurysm. Left coronary arteriography:  The LAD is 100%. The circumflex stent is patent. Right coronary stent is patent. Grafts are occluded. LIMA to LAD is patent. Left subclavian stent is patent. The right femoral vein was accessed. PA pressures were 78/37 with a wedge of 31. AO sat was 97. PA sat was 65. IMPRESSION:    1. Ejection fraction 35% with 100% left anterior descending and patent left internal mammary artery to the left anterior descending. 2.   Occluded grafts. 3.   Patent stents to the circumflex and right. 4.   Moderate pulmonary hypertension. RECOMMENDATIONS:    1.   Start Lasix. 2.   Change losartan to Entresto. 3.   Have Heart Failure Service to see.     Dictated By Allegra Wong M.D.  d: 04/12/2023 16:21:09  t: 04/13/2023 05:20:46  Job [id number]  [facility] [place] [place]/

## 2023-05-23 NOTE — CARDIAC REHAB
CAD education done. Pt states will attend cardiac rehab at [facility] which closer to her house. Occupational Therapy    Patient not seen in therapy.     Discontinue therapy: physician request    No skilled Occupational Therapy needs per Dr Nieves          OBJECTIVE                          Therapy procedure time and total treatment time can be found documented on the Time Entry flowsheet

## 2023-07-03 ENCOUNTER — LAB ENCOUNTER (OUTPATIENT)
Dept: LAB | Age: 83
End: 2023-07-03
Attending: STUDENT IN AN ORGANIZED HEALTH CARE EDUCATION/TRAINING PROGRAM
Payer: MEDICARE

## 2023-07-03 DIAGNOSIS — E78.00 PURE HYPERCHOLESTEROLEMIA: ICD-10-CM

## 2023-07-03 DIAGNOSIS — I65.29 CAROTID ARTERY OCCLUSION: ICD-10-CM

## 2023-07-03 DIAGNOSIS — M85.80 OSTEOPENIA, UNSPECIFIED LOCATION: Primary | ICD-10-CM

## 2023-07-03 DIAGNOSIS — R06.09 DOE (DYSPNEA ON EXERTION): ICD-10-CM

## 2023-07-03 DIAGNOSIS — I10 ESSENTIAL HYPERTENSION: ICD-10-CM

## 2023-07-03 DIAGNOSIS — I34.0 MODERATE MITRAL REGURGITATION: ICD-10-CM

## 2023-07-03 DIAGNOSIS — Z95.1 HX OF CABG: ICD-10-CM

## 2023-07-03 LAB
ANION GAP SERPL CALC-SCNC: 3 MMOL/L (ref 0–18)
BUN BLD-MCNC: 22 MG/DL (ref 7–18)
BUN/CREAT SERPL: 19.5 (ref 10–20)
CALCIUM BLD-MCNC: 9.8 MG/DL (ref 8.5–10.1)
CHLORIDE SERPL-SCNC: 107 MMOL/L (ref 98–112)
CO2 SERPL-SCNC: 30 MMOL/L (ref 21–32)
CREAT BLD-MCNC: 1.13 MG/DL
FASTING STATUS PATIENT QL REPORTED: NO
GFR SERPLBLD BASED ON 1.73 SQ M-ARVRAT: 48 ML/MIN/1.73M2 (ref 60–?)
GLUCOSE BLD-MCNC: 168 MG/DL (ref 70–99)
OSMOLALITY SERPL CALC.SUM OF ELEC: 297 MOSM/KG (ref 275–295)
POTASSIUM SERPL-SCNC: 4.3 MMOL/L (ref 3.5–5.1)
SODIUM SERPL-SCNC: 140 MMOL/L (ref 136–145)
VIT D+METAB SERPL-MCNC: 104.7 NG/ML (ref 30–100)

## 2023-07-03 PROCEDURE — 36415 COLL VENOUS BLD VENIPUNCTURE: CPT

## 2023-07-03 PROCEDURE — 80048 BASIC METABOLIC PNL TOTAL CA: CPT

## 2023-07-03 PROCEDURE — 82306 VITAMIN D 25 HYDROXY: CPT

## 2023-10-02 ENCOUNTER — LAB ENCOUNTER (OUTPATIENT)
Dept: LAB | Age: 83
End: 2023-10-02
Attending: INTERNAL MEDICINE
Payer: MEDICARE

## 2023-10-02 DIAGNOSIS — R06.09 DOE (DYSPNEA ON EXERTION): Primary | ICD-10-CM

## 2023-10-02 LAB
ANION GAP SERPL CALC-SCNC: 5 MMOL/L (ref 0–18)
BUN BLD-MCNC: 25 MG/DL (ref 7–18)
BUN/CREAT SERPL: 20.8 (ref 10–20)
CALCIUM BLD-MCNC: 9.5 MG/DL (ref 8.5–10.1)
CHLORIDE SERPL-SCNC: 105 MMOL/L (ref 98–112)
CO2 SERPL-SCNC: 32 MMOL/L (ref 21–32)
CREAT BLD-MCNC: 1.2 MG/DL
EGFRCR SERPLBLD CKD-EPI 2021: 45 ML/MIN/1.73M2 (ref 60–?)
FASTING STATUS PATIENT QL REPORTED: NO
GLUCOSE BLD-MCNC: 130 MG/DL (ref 70–99)
OSMOLALITY SERPL CALC.SUM OF ELEC: 300 MOSM/KG (ref 275–295)
POTASSIUM SERPL-SCNC: 4 MMOL/L (ref 3.5–5.1)
SODIUM SERPL-SCNC: 142 MMOL/L (ref 136–145)

## 2023-10-02 PROCEDURE — 80048 BASIC METABOLIC PNL TOTAL CA: CPT

## 2023-10-02 PROCEDURE — 36415 COLL VENOUS BLD VENIPUNCTURE: CPT

## 2023-12-05 ENCOUNTER — LAB ENCOUNTER (OUTPATIENT)
Dept: LAB | Age: 83
End: 2023-12-05
Attending: INTERNAL MEDICINE
Payer: MEDICARE

## 2023-12-05 DIAGNOSIS — R53.83 FATIGUE: ICD-10-CM

## 2023-12-05 DIAGNOSIS — I10 ESSENTIAL HYPERTENSION: ICD-10-CM

## 2023-12-05 DIAGNOSIS — I65.29 CAROTID ARTERY OCCLUSION: Primary | ICD-10-CM

## 2023-12-05 LAB
ANION GAP SERPL CALC-SCNC: 3 MMOL/L (ref 0–18)
BASOPHILS # BLD AUTO: 0.07 X10(3) UL (ref 0–0.2)
BASOPHILS NFR BLD AUTO: 1.7 %
BUN BLD-MCNC: 29 MG/DL (ref 9–23)
BUN/CREAT SERPL: 24.4 (ref 10–20)
CALCIUM BLD-MCNC: 9.8 MG/DL (ref 8.7–10.4)
CHLORIDE SERPL-SCNC: 103 MMOL/L (ref 98–112)
CO2 SERPL-SCNC: 32 MMOL/L (ref 21–32)
CREAT BLD-MCNC: 1.19 MG/DL
DEPRECATED RDW RBC AUTO: 40.8 FL (ref 35.1–46.3)
EGFRCR SERPLBLD CKD-EPI 2021: 45 ML/MIN/1.73M2 (ref 60–?)
EOSINOPHIL # BLD AUTO: 0.14 X10(3) UL (ref 0–0.7)
EOSINOPHIL NFR BLD AUTO: 3.3 %
ERYTHROCYTE [DISTWIDTH] IN BLOOD BY AUTOMATED COUNT: 12.1 % (ref 11–15)
FASTING STATUS PATIENT QL REPORTED: NO
GLUCOSE BLD-MCNC: 204 MG/DL (ref 70–99)
HCT VFR BLD AUTO: 41.7 %
HGB BLD-MCNC: 14.1 G/DL
IMM GRANULOCYTES # BLD AUTO: 0 X10(3) UL (ref 0–1)
IMM GRANULOCYTES NFR BLD: 0 %
LYMPHOCYTES # BLD AUTO: 1.12 X10(3) UL (ref 1–4)
LYMPHOCYTES NFR BLD AUTO: 26.6 %
MCH RBC QN AUTO: 31 PG (ref 26–34)
MCHC RBC AUTO-ENTMCNC: 33.8 G/DL (ref 31–37)
MCV RBC AUTO: 91.6 FL
MONOCYTES # BLD AUTO: 0.34 X10(3) UL (ref 0.1–1)
MONOCYTES NFR BLD AUTO: 8.1 %
NEUTROPHILS # BLD AUTO: 2.54 X10 (3) UL (ref 1.5–7.7)
NEUTROPHILS # BLD AUTO: 2.54 X10(3) UL (ref 1.5–7.7)
NEUTROPHILS NFR BLD AUTO: 60.3 %
OSMOLALITY SERPL CALC.SUM OF ELEC: 298 MOSM/KG (ref 275–295)
PLATELET # BLD AUTO: 153 10(3)UL (ref 150–450)
POTASSIUM SERPL-SCNC: 4.5 MMOL/L (ref 3.5–5.1)
RBC # BLD AUTO: 4.55 X10(6)UL
SODIUM SERPL-SCNC: 138 MMOL/L (ref 136–145)
WBC # BLD AUTO: 4.2 X10(3) UL (ref 4–11)

## 2023-12-05 PROCEDURE — 36415 COLL VENOUS BLD VENIPUNCTURE: CPT

## 2023-12-05 PROCEDURE — 80048 BASIC METABOLIC PNL TOTAL CA: CPT

## 2023-12-05 PROCEDURE — 85025 COMPLETE CBC W/AUTO DIFF WBC: CPT

## 2023-12-11 NOTE — PROGRESS NOTES
Preprocedure Instructions    Visitor Instructions    Adult Patients: 2 Adult Care Partners can accompany the patient day of procedure. 2 Care Partners may visit [phone] during inpatient stay     PreOp Instructions    You are scheduled for: a Cardiac Procedure    Date of Procedure: 12/13/23    Diet Instructions: Do not eat or drink anything after midnight    Other Medications: Last dose of Eliquis will be Dec 10th, continue all other medications as usual, even the aspirin. Skin Prep: Shower with antibacterial soap using a clean washcloth, prior to procedure    Arrival Time: You will receive a call the afternoon before your procedure after 3 pm on what time you should arrive the day of your procedure    Driving After Procedure: If sedation is given, you WILL NOT be able to drive home. You will need a responsible adult  to drive you home. Discharge Teaching: Your nurse will give you specific instructions before discharge; Most people can resume normal activities in 2-3 days; Any questions, please call the physician's office    [employer] parking is available starting at 6 am or park in the [place] parking garage at [address] in at the [place] reception desk. Our  will be there to check you in for your procedure. Please bring your insurance cards and ID with you. Please DO NOT respond to this message, the inbasket is not monitored for messages. For any questions, please call the physician's office.

## 2023-12-13 ENCOUNTER — HOSPITAL ENCOUNTER (OUTPATIENT)
Dept: INTERVENTIONAL RADIOLOGY/VASCULAR | Facility: HOSPITAL | Age: 83
Discharge: HOME | End: 2023-12-13
Attending: INTERNAL MEDICINE | Admitting: INTERNAL MEDICINE
Payer: MEDICARE

## 2023-12-13 VITALS
SYSTOLIC BLOOD PRESSURE: 115 MMHG | BODY MASS INDEX: 29.27 KG/M2 | HEIGHT: 61 IN | DIASTOLIC BLOOD PRESSURE: 51 MMHG | RESPIRATION RATE: 22 BRPM | TEMPERATURE: 97 F | OXYGEN SATURATION: 96 % | HEART RATE: 72 BPM | WEIGHT: 155 LBS

## 2023-12-13 DIAGNOSIS — R94.39 ABNORMAL STRESS TEST: ICD-10-CM

## 2023-12-13 DIAGNOSIS — I25.10 CAD (CORONARY ARTERY DISEASE): ICD-10-CM

## 2023-12-13 LAB
ATRIAL RATE: 70 BPM
P-R INTERVAL: 244 MS
Q-T INTERVAL: 440 MS
QRS DURATION: 112 MS
QTC CALCULATION (BEZET): 475 MS
R AXIS: 32 DEGREES
T AXIS: -37 DEGREES
VENTRICULAR RATE: 70 BPM

## 2023-12-13 PROCEDURE — 93459 L HRT ART/GRFT ANGIO: CPT | Performed by: INTERNAL MEDICINE

## 2023-12-13 PROCEDURE — 93010 ELECTROCARDIOGRAM REPORT: CPT | Performed by: INTERNAL MEDICINE

## 2023-12-13 PROCEDURE — 4A023N7 MEASUREMENT OF CARDIAC SAMPLING AND PRESSURE, LEFT HEART, PERCUTANEOUS APPROACH: ICD-10-PCS | Performed by: INTERNAL MEDICINE

## 2023-12-13 PROCEDURE — B2181ZZ FLUOROSCOPY OF LEFT INTERNAL MAMMARY BYPASS GRAFT USING LOW OSMOLAR CONTRAST: ICD-10-PCS | Performed by: INTERNAL MEDICINE

## 2023-12-13 PROCEDURE — B2131ZZ FLUOROSCOPY OF MULTIPLE CORONARY ARTERY BYPASS GRAFTS USING LOW OSMOLAR CONTRAST: ICD-10-PCS | Performed by: INTERNAL MEDICINE

## 2023-12-13 PROCEDURE — B2151ZZ FLUOROSCOPY OF LEFT HEART USING LOW OSMOLAR CONTRAST: ICD-10-PCS | Performed by: INTERNAL MEDICINE

## 2023-12-13 PROCEDURE — 99152 MOD SED SAME PHYS/QHP 5/>YRS: CPT | Performed by: INTERNAL MEDICINE

## 2023-12-13 PROCEDURE — B2111ZZ FLUOROSCOPY OF MULTIPLE CORONARY ARTERIES USING LOW OSMOLAR CONTRAST: ICD-10-PCS | Performed by: INTERNAL MEDICINE

## 2023-12-13 PROCEDURE — 93005 ELECTROCARDIOGRAM TRACING: CPT

## 2023-12-13 RX ORDER — SODIUM CHLORIDE 9 MG/ML
INJECTION, SOLUTION INTRAVENOUS
Status: DISCONTINUED | OUTPATIENT
Start: 2023-12-14 | End: 2023-12-13 | Stop reason: HOSPADM

## 2023-12-13 RX ORDER — HEPARIN SODIUM 5000 [USP'U]/ML
INJECTION, SOLUTION INTRAVENOUS; SUBCUTANEOUS
Status: COMPLETED
Start: 2023-12-13 | End: 2023-12-13

## 2023-12-13 RX ORDER — LIDOCAINE HYDROCHLORIDE 10 MG/ML
INJECTION, SOLUTION EPIDURAL; INFILTRATION; INTRACAUDAL; PERINEURAL
Status: COMPLETED
Start: 2023-12-13 | End: 2023-12-13

## 2023-12-13 RX ORDER — MIDAZOLAM HYDROCHLORIDE 1 MG/ML
INJECTION INTRAMUSCULAR; INTRAVENOUS
Status: DISCONTINUED
Start: 2023-12-13 | End: 2023-12-13 | Stop reason: WASHOUT

## 2023-12-13 RX ORDER — MIDAZOLAM HYDROCHLORIDE 1 MG/ML
INJECTION INTRAMUSCULAR; INTRAVENOUS
Status: COMPLETED
Start: 2023-12-13 | End: 2023-12-13

## 2023-12-13 RX ORDER — ACETAMINOPHEN 325 MG/1
TABLET ORAL
Status: COMPLETED
Start: 2023-12-13 | End: 2023-12-13

## 2023-12-13 RX ADMIN — ACETAMINOPHEN 650 MG: 325 TABLET ORAL at 09:40:00

## 2023-12-13 NOTE — PROCEDURES
[facility]    PATIENT'S NAME: Viri Hutchison   ATTENDING PHYSICIAN: Sweta Alba M.D. OPERATING PHYSICIAN: Sweta Alba M.D. PATIENT ACCOUNT#:   [de-identified]    LOCATION:  [address]  MEDICAL RECORD #:   [id number]       YOB: 1940  ADMISSION DATE:       12/13/2023      OPERATION DATE:  12/13/2023    CARDIAC PROCEDURE TRANSCRIPTION     #####EDITING MAY BE REQUIRED#####     CARDIAC CATHETERIZATION    PREOPERATIVE DIAGNOSIS:    POSTOPERATIVE DIAGNOSIS:    PROCEDURE PERFORMED:      DESCRIPTION OF PROCEDURE:  Right groin was anesthetized. There is some disease ______ small abdominal aneurysm. A 6-Malagasy sheath was placed. LV angiogram revealed inferobasal aneurysm with overall ejection fraction of approximately 40% to 45%. The stent to the circumflex is patent. The LAD is essentially occluded. The vein graft to the obtuse marginal is occluded. The vein graft to the right is occluded. The right coronary artery is 100% occluded. There are collaterals from the circumflex and also from the LAD into the PDA when we shot the subclavian.   ______ subclavian stent with a widely patent LIMA to the LAD with collaterals over the right coronary artery. IMPRESSION:    1. 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 2.   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 3.   A 100% LAD. 4.   Patent stent to the circumflex. 5.   Patent subclavian stent with patent LIMA to the LAD. 6.   Occluded right coronary artery with collaterals from the circumflex and the LAD. Will discuss  PCI in the future.     Dictated By Sweta Alba M.D.  d: 12/13/2023 07:40:48  t: 12/13/2023 08:02:06  Job [id number]  [facility]/

## 2023-12-13 NOTE — PROGRESS NOTES
Pt s/p [facility] with Dr. Yvon Brock. Pt recovered in holding. Pt daughter at bedside. Right groin dressing cdi, soft. +pedals. Pt flat for one hour then HOB elevated. Pt ate and drank. Discharge instructions reviewed with pt and daughter, copy given. After recovery pt out of bed with Bobbi Faustino to bathroom and auguste with no complaints. Right groin dressing remained cdi, soft. IV removed and pt dressed. Pt waited to talk to Dr. Yvon Brock before discharge. Pt discharged to [facility] via wheelchair by volunteer. Pt left with belonging. Pt daughter drove pt home.

## 2023-12-20 ENCOUNTER — HOSPITAL ENCOUNTER (OUTPATIENT)
Dept: CT IMAGING | Age: 83
Discharge: HOME | End: 2023-12-20
Attending: OBSTETRICS & GYNECOLOGY
Payer: MEDICARE

## 2023-12-20 DIAGNOSIS — C54.1 ENDOMETRIOID CARCINOMA OF ENDOMETRIUM (HCC): ICD-10-CM

## 2023-12-20 PROCEDURE — 71260 CT THORAX DX C+: CPT | Performed by: OBSTETRICS & GYNECOLOGY

## 2023-12-20 PROCEDURE — 74177 CT ABD & PELVIS W/CONTRAST: CPT | Performed by: OBSTETRICS & GYNECOLOGY

## 2024-02-13 NOTE — H&P
Purpose of the visit was to evaluate for: goals of care/advanced care planning. Spoke with RN, patient, and family and discussed palliative care and [facility].      Assessment:The patient is on 5stu, 2 liters nasal canula, heart monitor, does not have o2 at home, chronic pain and uses morphine 30 mg tablets. Reports pain is being managed here. The patient has no anxiety or nausea at this time.    Recommendations/Plan: [facility] referral.    Tasks Completed: EMS DNR, Code Status clarification, and Emotional Support.    Other Comments: Palliative care nurse visited with the patient and a son, daughter and grand daughter at the bedside. The patient talked about how his spouse had [facility] prior to her passing. The patient request to DC home with [facility] and will need oxygen and an hospital bed. [place] pharmacy [place] [facility] [place] [address]. The family request that the patient stay here until [facility] see's him and everything is set up in the home. I educated about the DC process and discussed code status. The patient wants to be NO CPR/DNI a this time and I educated on and completed an EMS DNR. Copy to chart and MR, original to family for home use as the patient reports he wants to DC via Car. Primary nurse and provider updated.[facility] referral made.     Update: [facility] appointment 2/14 at 11:30 with Maddie ARAUJO, RN, [facility]   Palliative Care    2/13/2024 11:33 EST     Seen and examined  No change

## 2024-03-14 ENCOUNTER — HOSPITAL ENCOUNTER (OUTPATIENT)
Dept: CT IMAGING | Age: 84
Discharge: HOME | End: 2024-03-14
Attending: RADIOLOGY
Payer: MEDICARE

## 2024-03-14 DIAGNOSIS — C54.1 ENDOMETRIAL CANCER (HCC): ICD-10-CM

## 2024-03-14 PROCEDURE — 71250 CT THORAX DX C-: CPT | Performed by: RADIOLOGY

## 2024-03-26 ENCOUNTER — HOSPITAL ENCOUNTER (INPATIENT)
Facility: HOSPITAL | Age: 84
LOS: 3 days | Discharge: HOME HEALTH | End: 2024-03-30
Attending: EMERGENCY MEDICINE | Admitting: HOSPITALIST
Payer: MEDICARE

## 2024-03-26 ENCOUNTER — APPOINTMENT (OUTPATIENT)
Dept: GENERAL RADIOLOGY | Facility: HOSPITAL | Age: 84
End: 2024-03-26
Attending: EMERGENCY MEDICINE
Payer: MEDICARE

## 2024-03-26 DIAGNOSIS — R06.00 DYSPNEA, UNSPECIFIED TYPE: Primary | ICD-10-CM

## 2024-03-26 DIAGNOSIS — D70.1 CHEMOTHERAPY INDUCED NEUTROPENIA (HCC): ICD-10-CM

## 2024-03-26 DIAGNOSIS — T45.1X5A CHEMOTHERAPY INDUCED NEUTROPENIA (HCC): ICD-10-CM

## 2024-03-26 PROBLEM — R19.7 DIARRHEA: Status: ACTIVE | Noted: 2024-03-26

## 2024-03-26 PROBLEM — J18.9 COMMUNITY ACQUIRED PNEUMONIA: Status: ACTIVE | Noted: 2024-03-26

## 2024-03-26 PROBLEM — E86.0 DEHYDRATION: Status: ACTIVE | Noted: 2024-03-26

## 2024-03-26 LAB
ALBUMIN SERPL-MCNC: 3.4 G/DL (ref 3.4–5)
ALBUMIN/GLOB SERPL: 1.1 {RATIO} (ref 1–2)
ALP LIVER SERPL-CCNC: 78 U/L
ALT SERPL-CCNC: 36 U/L
ANION GAP SERPL CALC-SCNC: 6 MMOL/L (ref 0–18)
AST SERPL-CCNC: 27 U/L (ref 15–37)
ATRIAL RATE: 80 BPM
BASOPHILS # BLD AUTO: 0.02 X10(3) UL (ref 0–0.2)
BASOPHILS NFR BLD AUTO: 1 %
BILIRUB SERPL-MCNC: 1 MG/DL (ref 0.1–2)
BILIRUB UR QL STRIP.AUTO: NEGATIVE
BUN BLD-MCNC: 30 MG/DL (ref 9–23)
CALCIUM BLD-MCNC: 9.8 MG/DL (ref 8.5–10.1)
CHLORIDE SERPL-SCNC: 103 MMOL/L (ref 98–112)
CLARITY UR REFRACT.AUTO: CLEAR
CO2 SERPL-SCNC: 26 MMOL/L (ref 21–32)
COLOR UR AUTO: YELLOW
CREAT BLD-MCNC: 1.12 MG/DL
EGFRCR SERPLBLD CKD-EPI 2021: 48 ML/MIN/1.73M2 (ref 60–?)
EOSINOPHIL # BLD AUTO: 0.22 X10(3) UL (ref 0–0.7)
EOSINOPHIL NFR BLD AUTO: 10.5 %
ERYTHROCYTE [DISTWIDTH] IN BLOOD BY AUTOMATED COUNT: 12.8 %
EST. AVERAGE GLUCOSE BLD GHB EST-MCNC: 151 MG/DL (ref 68–126)
GLOBULIN PLAS-MCNC: 3.2 G/DL (ref 2.8–4.4)
GLUCOSE BLD-MCNC: 108 MG/DL (ref 70–99)
GLUCOSE BLD-MCNC: 131 MG/DL (ref 70–99)
GLUCOSE BLD-MCNC: 157 MG/DL (ref 70–99)
GLUCOSE BLD-MCNC: 185 MG/DL (ref 70–99)
GLUCOSE UR STRIP.AUTO-MCNC: NORMAL MG/DL
HBA1C MFR BLD: 6.9 % (ref ?–5.7)
HCT VFR BLD AUTO: 35.4 %
HGB BLD-MCNC: 12.8 G/DL
HYALINE CASTS #/AREA URNS AUTO: PRESENT /LPF
IMM GRANULOCYTES # BLD AUTO: 0.02 X10(3) UL (ref 0–1)
IMM GRANULOCYTES NFR BLD: 1 %
KETONES UR STRIP.AUTO-MCNC: NEGATIVE MG/DL
LEUKOCYTE ESTERASE UR QL STRIP.AUTO: 25
LYMPHOCYTES # BLD AUTO: 0.82 X10(3) UL (ref 1–4)
LYMPHOCYTES NFR BLD AUTO: 39.2 %
MCH RBC QN AUTO: 30.9 PG (ref 26–34)
MCHC RBC AUTO-ENTMCNC: 36.2 G/DL (ref 31–37)
MCV RBC AUTO: 85.5 FL
MONOCYTES # BLD AUTO: 0.08 X10(3) UL (ref 0.1–1)
MONOCYTES NFR BLD AUTO: 3.8 %
NEUTROPHILS # BLD AUTO: 0.93 X10 (3) UL (ref 1.5–7.7)
NEUTROPHILS # BLD AUTO: 0.93 X10(3) UL (ref 1.5–7.7)
NEUTROPHILS NFR BLD AUTO: 44.5 %
NITRITE UR QL STRIP.AUTO: NEGATIVE
OSMOLALITY SERPL CALC.SUM OF ELEC: 289 MOSM/KG (ref 275–295)
P-R INTERVAL: 186 MS
PH UR STRIP.AUTO: 6.5 [PH] (ref 5–8)
PLATELET # BLD AUTO: 80 10(3)UL (ref 150–450)
POTASSIUM SERPL-SCNC: 4.4 MMOL/L (ref 3.5–5.1)
PROCALCITONIN SERPL-MCNC: 0.07 NG/ML (ref ?–0.16)
PROT SERPL-MCNC: 6.6 G/DL (ref 6.4–8.2)
Q-T INTERVAL: 386 MS
QRS DURATION: 94 MS
QTC CALCULATION (BEZET): 445 MS
R AXIS: 21 DEGREES
RBC # BLD AUTO: 4.14 X10(6)UL
RBC UR QL AUTO: NEGATIVE
SODIUM SERPL-SCNC: 135 MMOL/L (ref 136–145)
SP GR UR STRIP.AUTO: 1.02 (ref 1–1.03)
T AXIS: 120 DEGREES
TROPONIN I SERPL HS-MCNC: 20 NG/L
TROPONIN I SERPL HS-MCNC: 22 NG/L
TROPONIN I SERPL HS-MCNC: 22 NG/L
UROBILINOGEN UR STRIP.AUTO-MCNC: NORMAL MG/DL
VENTRICULAR RATE: 80 BPM
WBC # BLD AUTO: 2.1 X10(3) UL (ref 4–11)

## 2024-03-26 PROCEDURE — 99223 1ST HOSP IP/OBS HIGH 75: CPT | Performed by: HOSPITALIST

## 2024-03-26 PROCEDURE — 71045 X-RAY EXAM CHEST 1 VIEW: CPT | Performed by: EMERGENCY MEDICINE

## 2024-03-26 RX ORDER — NICOTINE POLACRILEX 4 MG
30 LOZENGE BUCCAL
Status: DISCONTINUED | OUTPATIENT
Start: 2024-03-26 | End: 2024-03-30

## 2024-03-26 RX ORDER — ACETAMINOPHEN 500 MG
500 TABLET ORAL EVERY 4 HOURS PRN
Status: DISCONTINUED | OUTPATIENT
Start: 2024-03-26 | End: 2024-03-30

## 2024-03-26 RX ORDER — FLUOXETINE HYDROCHLORIDE 20 MG/1
20 CAPSULE ORAL EVERY MORNING
Status: DISCONTINUED | OUTPATIENT
Start: 2024-03-27 | End: 2024-03-30

## 2024-03-26 RX ORDER — NICOTINE POLACRILEX 4 MG
15 LOZENGE BUCCAL
Status: DISCONTINUED | OUTPATIENT
Start: 2024-03-26 | End: 2024-03-30

## 2024-03-26 RX ORDER — ONDANSETRON 2 MG/ML
4 INJECTION INTRAMUSCULAR; INTRAVENOUS EVERY 6 HOURS PRN
Status: DISCONTINUED | OUTPATIENT
Start: 2024-03-26 | End: 2024-03-30

## 2024-03-26 RX ORDER — SODIUM CHLORIDE 9 MG/ML
INJECTION, SOLUTION INTRAVENOUS CONTINUOUS
Status: DISCONTINUED | OUTPATIENT
Start: 2024-03-26 | End: 2024-03-29

## 2024-03-26 RX ORDER — DEXTROSE MONOHYDRATE 25 G/50ML
50 INJECTION, SOLUTION INTRAVENOUS
Status: DISCONTINUED | OUTPATIENT
Start: 2024-03-26 | End: 2024-03-30

## 2024-03-26 RX ORDER — ASPIRIN 81 MG/1
81 TABLET ORAL NIGHTLY
Status: DISCONTINUED | OUTPATIENT
Start: 2024-03-26 | End: 2024-03-29

## 2024-03-26 RX ORDER — ASCORBIC ACID 500 MG
500 TABLET ORAL DAILY
Status: DISCONTINUED | OUTPATIENT
Start: 2024-03-26 | End: 2024-03-26 | Stop reason: ALTCHOICE

## 2024-03-26 RX ORDER — METOPROLOL SUCCINATE 50 MG/1
50 TABLET, EXTENDED RELEASE ORAL NIGHTLY
Status: DISCONTINUED | OUTPATIENT
Start: 2024-03-26 | End: 2024-03-30

## 2024-03-26 RX ORDER — MELATONIN
3 NIGHTLY PRN
Status: DISCONTINUED | OUTPATIENT
Start: 2024-03-26 | End: 2024-03-30

## 2024-03-26 RX ORDER — METOCLOPRAMIDE HYDROCHLORIDE 5 MG/ML
5 INJECTION INTRAMUSCULAR; INTRAVENOUS EVERY 8 HOURS PRN
Status: DISCONTINUED | OUTPATIENT
Start: 2024-03-26 | End: 2024-03-30

## 2024-03-26 NOTE — ED PROVIDER NOTES
Patient Seen in: [facility] Emergency Department      History     Chief Complaint   Patient presents with    Difficulty Breathing     Stated Complaint: Shortness of breath x 5 days    Subjective:   HPI    Patient has a complicated medical history.  Coronary artery disease status post CABG and multiple stents.  Has a known .  Per the patient there is a plan for intervention on this in April.  She is here progressive shortness of breath for 5 to 7 days, getting worse.  She states that to the point that she no longer feels comfortable walking around.  Denies any chest pain bad that she never gets chest pain.    No infectious symptoms.    Of note, she has a diagnosis of endometrial cancer,  finished chemotherapy, last dose was 1 week ago.        Objective:   Past Medical History:   Diagnosis Date    Acute hepatitis C without mention of hepatic coma(070.51)     Za genotype 7/06, bx 9/06    AICD (automatic cardioverter/defibrillator) present 06/2013    Anemia     ASHD (arteriosclerotic heart disease)     s/p CABG-complex RCA stent 6/11 R SC stent 6/11; status post angioplasty and drug-eluting stent 2/2015;    S/p PCI/MEHDI SVG-OM2/2016    Atherosclerosis of coronary artery     Autoimmune hepatitis (HCC)     Cardiomyopathy (HCC)     EF 40%    CKD (chronic kidney disease)     2-3    Coronary atherosclerosis of unspecified type of vessel, native or graft     CVD (cerebrovascular disease)     R 50% L <50% R vert > 50%; status post right carotid endarterectomy 4/2016    Depression     Elevated LFTs     hep c 7/06, bx 3/10    Essential hypertension ?    Glucose intolerance (pre-diabetes)     Heart attack (HCC)     Heart disease ?8628-3343    bypass/pacemaker/stents/TCO-    High blood pressure     High cholesterol     History of right shoulder fracture 2019    Hyperlipemia     Hyperlipidemia     Hypertension     b/p check R arm    Incontinence     Osteopenia     DEXA 10/2012-lumbar spine T score -1.9, femoral neck T  score -1.4; history of right shoulder fracture 2019    Positive VALARIE (antinuclear antibody)     Primary biliary cirrhosis (HCC)     PVD (peripheral vascular disease) (HCC)     Shortness of breath     Stage 3 chronic kidney disease (HCC)     Subclavian steal syndrome     Thrombocytopenia (HCC)     Type 2 diabetes mellitus with diabetic chronic kidney disease (HCC) 01/07/2016    Type 2 diabetes mellitus with stage 3 chronic kidney disease, without long-term current use of insulin (HCC)     Type II or unspecified type diabetes mellitus without mention of complication, not stated as uncontrolled (aka 50865)     Viral hepatitis C ??    went through treatment    Visual impairment               Past Surgical History:   Procedure Laterality Date    ANGIOGRAM  03/01/1996    done @[facility]; medical therapy    ANGIOGRAM  06/03/1996    done @[facility]; medical therapy    ANGIOGRAM  03/03/1997    ANGIOGRAM  03/28/2007    ANGIOGRAM  04/05/2010    ANGIOGRAM  06/06/2011    ANGIOGRAM  02/11/2013    ANGIOGRAM  02/01/2016    ANGIOPLASTY (CORONARY)  09/25/2017    stent to LAD    ANGIOPLASTY (CORONARY)  04/11/2017    stent to OM    ANGIOPLASTY (CORONARY)  02/05/2016    stent to OM    ANGIOPLASTY (CORONARY)  02/13/2013    stent to RCA    ANGIOPLASTY (CORONARY)  02/27/2013    Left subclavian angioplasty    ANGIOPLASTY (CORONARY)  07/13/2011    Left subclavian angioplasty/stent    ANGIOPLASTY (CORONARY)  06/08/2011    stent to RCA    ARTHROSCOPY OF JOINT UNLISTED      CABG  1996    mar lad/svg to om svg to rca    CARDIAC DEFIBRILLATOR PLACEMENT      Med[employer] ICD, intermittently dependent    CARDIAC PACEMAKER PLACEMENT      CARPAL TUNNEL RELEASE  ??    CATH DRUG ELUTING STENT      CATH PERCUTANEOUS  TRANSLUMINAL CORONARY ANGIOPLASTY      x2 stents Feb/2016    CHOLECYSTECTOMY      COLONOSCOPY      KNEE ARTHROSCOPY  ?    OPEN HEART SURGERY (PBP)      [id number]    ORTHOPEDIC SURG (PBP)      KNEE AND CARPAL TUNNEL  SURGERY    OTHER SURGICAL HISTORY Right 04/2016    right carotid endarterectomy    TONSILLECTOMY                  Social History     Socioeconomic History    Marital status:    Tobacco Use    Smoking status: Never    Smokeless tobacco: Never   Vaping Use    Vaping Use: Never used   Substance and Sexual Activity    Alcohol use: No    Drug use: Never              Review of Systems    Positive for stated complaint: Shortness of breath x 5 days  Other systems are as noted in HPI.  Constitutional and vital signs reviewed.      All other systems reviewed and negative except as noted above.    Physical Exam     ED Triage Vitals   BP 03/26/24 1030 114/79   Pulse 03/26/24 1030 83   Resp 03/26/24 1030 20   Temp 03/26/24 1028 97.4 °F (36.3 °C)   Temp src 03/26/24 1028 Temporal   SpO2 03/26/24 1030 97 %   O2 Device 03/26/24 1028 None (Room air)       Current:/82   Pulse 72   Temp 97.4 °F (36.3 °C) (Temporal)   Resp 21   Ht 154.9 cm (5' 1\")   Wt 67.1 kg   SpO2 100%   BMI 27.96 kg/m²         Physical Exam    Physical Exam   Constitutional: Awake, alert, well appearing  Head: Normocephalic and atraumatic.   Eyes: Conjunctivae are normal. Pupils are equal, round, and reactive to light.   Neck: Normal range of motion. Neck supple.   Cardiovascular: Normal rate, regular rhythm  Pulmonary/Chest: Normal effort.  No accessory muscle use.  No clubbing, no cyanosis.  Abdominal: Soft. Bowel sounds are normal.   Neurological: Pt is alert and oriented to person, place, and time. no cranial nerve deficits  Skin: Skin is warm and dry.    Lungs clear no lower EXTR edema or calf tenderness    ED Course     Labs Reviewed   COMP METABOLIC PANEL (14) - Abnormal; Notable for the following components:       Result Value    Glucose 157 (*)     Sodium 135 (*)     BUN 30 (*)     Creatinine 1.12 (*)     eGFR-Cr 48 (*)     All other components within normal limits   URINALYSIS, ROUTINE - Abnormal; Notable for the following components:     Protein Urine Trace (*)     Leukocyte Esterase Urine 25 (*)     Bacteria Urine Rare (*)     Squamous Epi. Cells Few (*)     Hyaline Casts Present (*)     All other components within normal limits   CBC W/ DIFFERENTIAL - Abnormal; Notable for the following components:    WBC 2.1 (*)     PLT 80.0 (*)     Neutrophil Absolute Prelim 0.93 (*)     Neutrophil Absolute 0.93 (*)     Lymphocyte Absolute 0.82 (*)     Monocyte Absolute 0.08 (*)     All other components within normal limits   TROPONIN I HIGH SENSITIVITY - Normal   CBC WITH DIFFERENTIAL WITH PLATELET    Narrative:     The following orders were created for panel order CBC With Differential With Platelet.  Procedure                               Abnormality         Status                     ---------                               -----------         ------                     CBC W/ DIFFERENTIAL[[id number]]          Abnormal            Final result                 Please view results for these tests on the individual orders.   SCAN SLIDE   RAINBOW DRAW LAVENDER   RAINBOW DRAW LIGHT GREEN   RAINBOW DRAW BLUE     EKG    Rate, intervals and axes as noted on EKG Report.  Rate: 80  Rhythm: Sinus Rhythm  Reading: Sinus rhythm, ST depression T wave changes in the high lateral leads are new compared to old EKG but have been seen on EKGs remotely in the past.                 All labs notable for low white count and low platelet count likely related to her her chemotherapy.  Troponin is negative.    XR CHEST AP PORTABLE  (CPT=71045)    Result Date: 3/26/2024  CONCLUSION:  1. Left lower lobe retrocardiac atelectasis and or scar, early consolidative process cannot be excluded, correlate clinically.   LOCATION:  [facility]      Dictated by (CST): Gauri Swanson MD on 3/26/2024 at 11:47 AM     Finalized by (CST): Gauri Swanson MD on 3/26/2024 at 11:49 AM        Lungs sound clear on exam, do not suspect pneumonia.         MDM          Differential diagnoses considered: Unstable angina,  pneumonia, side effect from chemotherapy, anemia.  -Suspect symptoms are multifactorial, negative troponin would argue against primary cardiac etiology but does not necessarily rule it out.  -Patient is specifically concerned about this representing ACS.  Discussed briefly with BC.  Will admit her for serial tropes and cardiology consultation.    -Possibly PE was considered but she is on Eliquis and reports compliance      *My independent interpretation of radiographs: No acute CHF on chest x-ray    *Discussion of ongoing management of this patient's care included:  admitting physician, BC  *Comorbidities contributing to the complexity of decision making: Coronary artery disease status post CABG and multiple stents, endometrial cancer recently completed chemotherapy  *External charts reviewed: Catheterization report from December 23 reviewed, notes .  *Additional sources of history: n/a    Shared decision making was done by: patient, myself.        1345  [facility] physician did come down and trying to the patient the patient requested  [facility] hospitalist.  I will have them page.                             Medical Decision Making      Disposition and Plan     Clinical Impression:  No diagnosis found.     Disposition:  There is no disposition on file for this visit.  There is no disposition time on file for this visit.    Follow-up:  No follow-up provider specified.        Medications Prescribed:  Current Discharge Medication List

## 2024-03-26 NOTE — HISTORICAL OFFICE NOTE
[facility]  [address] [place] [address], 4th floor, [place] [address]  [phone]      Sol Dee  Progress Note  Demographics:  Name: Sol Dee YOB: 1940  Age: 83, Female Medical Record No: [id number]  Visited Date/Time: 01/25/2024 02:30 PM    Chief Complaints  follow up   PET-11/14  History of Present Illness  Patient with history of bypass surgery with prior ejection fraction 35% which improved to 45% guideline directed medical therapy.  She is post ICD.  Patient had shockwave and stent of the right coronary in June 2022.  She had a patent LIMA to the LAD.  She has known stent to the circumflex.  Recent Lexiscan PET showed a small reversible in atrial septal defect.  Ejection fraction 42%  Cardiac risk factors Diabetes, Hypertension, Dyslipidemia, Family history of premature CAD, Physical inactivity and Never smoked  Past Medical History  1.Uterine cancer  2.Encounter for pre-operative cardiovascular clearance  3.Family history of early CAD  4.PAF (paroxysmal atrial fibrillation)  5.ACC/AHA stage C chronic systolic heart failure  6.Fatigue  7.Severe pulmonary hypertension  8.Ischemic cardiomyopathy  9.YOUNG (dyspnea on exertion)  10.Essential hypertension  11.Moderate tricuspid regurgitation  12.Moderate mitral regurgitation  13.Pure hypercholesterolemia  14.S/P angioplasty with stent  15.Stage 3 chronic kidney disease  16.Hx of CABG  17.Nausea  18.Type 2 diabetes mellitus with stage 3 chronic kidney disease, without long-term current use of insulin  19.ICD (implantable cardioverter-defibrillator) in place  20.Chronic total occlusion of coronary artery  21.Carotid artery occlusion  Past Surgical History  1.S/P angioplasty with stent  2.Hx of CABG  3.ICD (implantable cardioverter-defibrillator) in place  Family History  1. Father - CAD native (coronary artery disease); Presence of stent in artery; History of myocardial infarction (MI) - Hx; Hypercholesterolemia,  unspecified  2. Son - CAD native (coronary artery disease); Presence of stent in artery  Social History  Smoking status Never smoked  Tobacco usage - No (Non-smoker (finding))  Review of systems  Cardiovascular No history of Chest pain, YOUNG, Palpitations, Syncope, PND, Orthopnea, Edema and Claudication  Respiratory No history of SOB  Physical Examination  Vitals Right Arm Sitting  / 62 mmHg, Pulse rate 81 bpm, Regular, Height in 5' 1\", BMI: 28.3, Weight in 150 lbs (or) 68 kgs and BSA : 1.73 cc/m²  General Appearance No Acute Distress and Normal Body habitus  Head/Eyes/Ears/Nose/Mouth/Throat Conjunctiva pink and Mucous membranes Moist  Neck Normal carotid pulsations, No carotid bruits and No JVD  Respiratory Unlabored and Lungs clear with normal breath sounds  Cardiovascular Intact distal pulses and Regular rhythm. Normal rate present. Normal and normal S1 and S2    Gastrointestinal Abdomen soft and Non-tender  Musculoskeletal Normal spine  Lower Extremities Pulses 2+ and equal bilaterally and No edema  Skin Warm and dry and No rashes or lesions  Neurologic / Psychiatric Alert and Oriented  Speech Normal speech  Allergies  1.Atorvastatin(Reaction:, Severity:Mild)  2.Colestid Flavored - Drug Class(Reaction:unknown, Severity:Unknown)  3.Statins-Hmg-Coa Reductase Inhibitors - Allergen(Reaction:myalgia, Severity:Moderate)  Medications (Info obtained by: Verbal)  1.aspirin 81 mg tablet,delayed release, Take 1 tablet orally once a day.  2.cholecalciferol (vitamin D3) 25 mcg (1,000 unit) chewable tablet, Take 1 tablet orally once a day.  3.Eliquis 5 mg tablet, Take 1 tablet orally 2 times a day.  4.Entresto 24 mg-26 mg tablet, Take 1 tablet orally 2 times a day.  5.Fish OiL 360 mg-1,200 mg capsule, Take 1 capsule orally once a day.  6.FLUoxetine 20 mg tablet, Take 1 tablet orally once a day.  7.metoprolol succinate ER 50 mg tablet,extended release 24 hr, Take 1 tablet orally once a day.  8.Repatha Sureclick 140 Mg/Ml  Inj Amge, INJECT CONTENTS OF PEN ONCE EVERY 14 DAYS  9.Spironolactone 25 Mg Tab Oxfo, Take one-half tablet orally once a day  10.Vitamin C 100 mg tablet, Take 1 tablet orally once a day.  Impression  1.ACC/AHA stage C chronic systolic heart failure  2.Fatigue  3.Severe pulmonary hypertension  4.Ischemic cardiomyopathy  5.YOUNG (dyspnea on exertion)  6.Essential hypertension  7.Moderate tricuspid regurgitation  8.Moderate mitral regurgitation  9.Pure hypercholesterolemia  10.S/P angioplasty with stent  11.Stage 3 chronic kidney disease  12.Hx of CABG  13.Type 2 diabetes mellitus with stage 3 chronic kidney disease, without long-term current use of insulin  14.ICD (implantable cardioverter-defibrillator) in place  15.Chronic total occlusion of coronary artery  Assessment & Plan  This patient prior bypass surgery with ejection fraction that was 3035% which now improved to 40 to 45% with guideline directed medical therapy.  She is post stenting of the right coronary artery and circumflex.  She has small defect on her nuclear stress test.  Her dyspnea is somewhat improved.  She is post ICD.  She has known hypertension chronic kidney disease and diabetes.  Overall she feels better.  She will see me back in May.    Plan continue current therapy  2.  See me in May  Future appointments  1.Follow up visit - Rory Longoria MD (4 Months)  Miscellaneous  1.Weight monitoring (regime/therapy)  Nurses documentation  Refills: none  Upcoming surgeries: none  Use of assistive device: none  EKG: no  (JAIME GIRON)  Patient instructions  per verbal MD orders:   Recommend Dr. Liliya Villanueva () or Dr. Uziel Jacob ([phone])  Follow up with Dr. Longoria in May  Lab Details  BASIC METABOLIC PANEL (8)  12/05/2023 02:25:27 PM  GLUCOSE 204 70-99 mg/dL H F  SODIUM 138 136-145 mmol/L  F  POTASSIUM 4.5 3.5-5.1 mmol/L  F  CHLORIDE 103  mmol/L  F  CO2 32.0 21.0-32.0 mmol/L  F  ANION GAP 3 0-18 mmol/L  F  BUN 29 9-23  mg/dL H F  CREATININE 1.19 0.55-1.02 mg/dL H F  BUN/ CREAT RATIO 24.4 10.0-20.0 H F  CALCIUM 9.8 8.7-10.4 mg/dL  F  OSMOLALITY CALCULATED 298 275-295 mOsm/kg H F  E GFR CR 45 >=60 mL/min/1.73m2 L F  FASTING PATIENT BMP ANSWER No   F  CBC W/ DIFFERENTIAL  12/05/2023 12:58:43 PM  WBC 4.2 4.0-11.0 x10(3) uL  F  RED BLOOD COUNT 4.55 3.80-5.30 x10(6)uL  F  HGB 14.1 12.0-16.0 g/dL  F  HCT 41.7 35.0-48.0 %  F  MEAN CELL VOLUME 91.6 80.0-100.0 fL  F  MEAN CORPUSCULAR HEMOGLOBIN 31.0 26.0-34.0 pg  F  MEAN CORPUSCULAR HGB CONC 33.8 31.0-37.0 g/dL  F  RED CELL DISTRIBUTION WIDTH-SD 40.8 35.1-46.3 fL  F  RED CELL DISTRIBUTION WIDTH CV 12.1 11.0-15.0 %  F  PLATELETS 153.0 150.0-450.0 10(3)uL  F  NEUTROPHIL ABS PRELIM 2.54 1.50-7.70 x10 (3) uL  F  NEUTROPHIL ABSOLUTE 2.54 1.50-7.70 x10(3) uL  F  LYMPHOCYTE ABSOLUTE 1.12 1.00-4.00 x10(3) uL  F  MONOCYTE ABSOLUTE 0.34 0.10-1.00 x10(3) uL  F  EOSINOPHIL ABSOLUTE 0.14 0.00-0.70 x10(3) uL  F  BASOPHIL ABSOLUTE 0.07 0.00-0.20 x10(3) uL  F  IMMATURE GRANULOCYTE COUNT 0.00 0.00-1.00 x10(3) uL  F  NEUTROPHIL % 60.3 %  F  LYMPHOCYTE % 26.6 %  F  MONOCYTE % 8.1 %  F  EOSINOPHIL % 3.3 %  F  BASOPHIL % 1.7 %  F  IMMATURE GRANULOCYTE RATIO % 0.0 %  F  TROPONIN I HIGH SENSITIVITY  02/14/2023 06:35:34 AM  TROPONIN I HIGH SENSITIVITY 616 <=54 ng/L HH F  PLATELET COUNT  02/14/2023 05:55:31 AM  PLATELETS 148.0 150.0-450.0 10(3)uL L F  PTT, ACTIVATED  02/13/2023 11:53:47 PM  .0 23.3-35.6 seconds H F  Diagnostics Details  Nuclear PET 11/14/2023  1.Stress EKG is normal.    1.This is an abnormal perfusion study.    2.Small fixed inferobasal defect of moderate severity. Small fixed lateral wall perfusion defect of mild severity. Small reversible inferoseptal perfusion defect of mild severity.    3.The left ventricular cavity is noted to be normal on the stress studies. The stress left ventricular ejection fraction was calculated to be 42% and left ventricular global function is mildly reduced.  The rest left ventricular cavity is noted to be normal. The rest left ventricular ejection fraction was calculated to be 43% and rest left ventricular global function is mildly reduced. There was inferobasal, septal and lateral wall hypokinesia.    Trans Thoracic Echocardiogram 06/26/2023  1.The study quality is average.    2.The left ventricle is normal in size. Mild LV hypertrophy Global left ventricular systolic function is mildly decreased. The left ventricular ejection fraction is 45-50%. The left ventricle diastolic function is impaired (Grade I) with normal left atrial pressure. There is mild hypokinesis of basal and mid inferior wall region and mid lateral segment.    3.The left atrial diameter is mildly increased. Left atrial diameter is 4.2 cm.    4.There is aortic sclerosis.. Mild calcification of the aortic valve is noted with adequate cuspal excursion.    5.Mild calcification of the mitral valve is noted. Mild mitral regurgitation.    6. Mild (1+) tricuspid regurgitation.    7.The pulmonary artery systolic pressure is 30 mmHg, normal.    8.As compared to prior study - 02/2023 - LV systolic function improved LVEF increased from 25-30% to 45-50%, moderate MR decreased to mild degree, moderate TR decreased to mild degree, atria sizes decreased, pulmonary HTN decreased from 53mmHg to 30mmHg and LV filling pressures decreased.    Exercise Myocardial Perfusion Imaging 02/14/2022  1.Stress EKG is normal.    1.This is an abnormal perfusion study.    2.Medium sized, primarily fixed lateral wall perfusion defect of moderate intensity. Small, primarily fixed inferobasal defect of mild intensity.    3.The left ventricular cavity is noted to be normal on the stress study. The left ventricular ejection fraction was calculated to be 38% and left ventricular global function is moderately reduced. There is severe inferolateral hypokinesia and mild anterior wall hypokinesia.    4.Baseline ECG: NSR with IRBBB and possible  prior inferolateral MI (not an accelerated junctional rhythm as outlined below in the automated body of the report)    Care Providers: Rory Longoria MD, Twila Nick, Kya Scott and Charity Cervantes CCM  Electronically Authenticated by  Rory Longoria MD  01/27/2024 10:23:46 AM  Disclaimer: Components of this note were documented using voice recognition system and are subject to errors not corrected at proofreading. Contact the author of this note for any clarifications.

## 2024-03-26 NOTE — PROGRESS NOTES
After a few mins of conversation, patient said she did not want to be seen by [facility] physicians. Notified ER MD who will call [facility] hospitalist team. Please call for questions.

## 2024-03-26 NOTE — ED QUICK NOTES
Orders for admission, patient is aware of plan and ready to go upstairs. Any questions, please call ED RN Shaneka at extension [phone].     Patient Covid vaccination status: Fully vaccinated     COVID Test Ordered in ED: None    COVID Suspicion at Admission: N/A    Running Infusions:  None    Mental Status/LOC at time of transport: AOx4    Other pertinent information:   CIWA score: N/A   NIH score:  N/A

## 2024-03-26 NOTE — PROGRESS NOTES
03/26/24 1700 03/26/24 1703 03/26/24 1706   Vital Signs   /69 127/67 (!) 67/40   MAP (mmHg) 89 79 (!) 49   BP Location Right arm Right arm Right arm   BP Method Automatic Automatic Automatic   Patient Position Lying Sitting Standing

## 2024-03-26 NOTE — IMAGING NOTE
[facility] Cardiac PET 11/14/2023   This is an abnormal perfusion study.    Small fixed inferobasal defect of moderate severity. Small fixed lateral wall perfusion defect of mild severity. Small reversible inferoseptal perfusion defect of mild severity.   The left ventricular cavity is noted to be normal on the stress studies. The stress left ventricular ejection fraction was calculated to be 42% and left ventricular global function is mildly reduced. The rest left ventricular cavity is noted to be normal. The rest left ventricular ejection fraction was calculated to be 43% and rest left ventricular global function is mildly reduced. There was inferobasal, septal and lateral wall hypokinesia.        [facility] Echo 6/26/2023  1. The study quality is average.   2. The left ventricle is normal in size.  Mild LV hypertrophy  Global left ventricular systolic function is mildly decreased. The left ventricular ejection fraction is 45-50%. The left ventricle diastolic function is impaired (Grade I) with normal left atrial pressure.  There  is mild  hypokinesis of basal and mid inferior wall region and mid lateral segment.  3. The left atrial diameter is mildly increased. Left atrial diameter is 4.2 cm.   4. There is aortic sclerosis.. Mild calcification of the aortic valve is noted with adequate cuspal excursion.   5. Mild calcification of the mitral valve is noted. Mild mitral regurgitation.  6.  Mild (1+) tricuspid regurgitation.  7. The pulmonary artery systolic pressure is 30 mmHg, normal.   8. As compared to prior study - 02/2023 - LV systolic function improved LVEF increased from 25-30% to 45-50%, moderate MR decreased to mild degree, moderate TR decreased to mild degree, atria sizes decreased, pulmonary HTN decreased from 53mmHg to 30mmHg and LV filling pressures decreased.       [facility] Cardiac PET 9/2022     This is an abnormal perfusion study.    Small, fixed inferobasal perfusion defect of mild intensity (RCA distribution).   Small fixed lateral wall perfusion defect of mild intensity (LCX).   There is paradoxical septal motion with inferobasal and lateral wall hypokinesia. The resting LVEF is 49%. The LVEF at stress is 51%.

## 2024-03-26 NOTE — CONSULTS
Cardiology Consultation    Sol Dee Patient Status:  Emergency    3/11/1940 MRN [id number]   [facility] EMERGENCY DEPARTMENT Attending Darío Hall MD   Hosp Day # 0 PCP Atremio Reeder MD     Reason for Consultation:  dyspnea, CAD      History of Present Illness:  Sol Dee is a a(n) 84 year old female. Very sweet woman, follows with Dr. Longoria.  She has had prior CABG and PCI's.  Last cath 23 with  of RCA with occluded SVG.    Ef 40%, has AICD.  She has endometrial CA and finished round of chemo last week.  For the past 5 days she has been very weak and dyspneic walking room to room at home.  She has almost fallen.  She has had nausea, vomiting, and diarrhea.  She has not felt any better, so came to the ER today.  No edema or chest pain.  Here, troponin negative, CXR w/o clear CHF, Hb 12.8.  she was given fluids and to be admitted.    History:  Past Medical History:   Diagnosis Date    Acute hepatitis C without mention of hepatic coma(070.51)     Za genotype , bx     AICD (automatic cardioverter/defibrillator) present 2013    Anemia     ASHD (arteriosclerotic heart disease)     s/p CABG-complex RCA stent  R SC stent ; status post angioplasty and drug-eluting stent 2015;    S/p PCI/MEHDI [id number]    Atherosclerosis of coronary artery     Autoimmune hepatitis (HCC)     Cardiomyopathy (HCC)     EF 40%    CKD (chronic kidney disease)     2-3    Coronary atherosclerosis of unspecified type of vessel, native or graft     CVD (cerebrovascular disease)     R 50% L <50% R vert > 50%; status post right carotid endarterectomy 2016    Depression     Elevated LFTs     hep c , bx 3/10    Essential hypertension ?    Glucose intolerance (pre-diabetes)     Heart attack (HCC)     Heart disease ?[id number]    bypass/pacemaker/stents/TCO-    High blood pressure     High cholesterol     History of right shoulder fracture     Hyperlipemia      Hyperlipidemia     Hypertension     b/p check R arm    Incontinence     Osteopenia     DEXA 10/2012-lumbar spine T score -1.9, femoral neck T score -1.4; history of right shoulder fracture 2019    Positive VALARIE (antinuclear antibody)     Primary biliary cirrhosis (HCC)     PVD (peripheral vascular disease) (HCC)     Shortness of breath     Stage 3 chronic kidney disease (HCC)     Subclavian steal syndrome     Thrombocytopenia (HCC)     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    went through treatment    Visual impairment      Past Surgical History:   Procedure Laterality Date    ANGIOGRAM  03/01/1996    done @[facility]; medical therapy    ANGIOGRAM  06/03/1996    done @[facility]; medical therapy    ANGIOGRAM  03/03/1997    ANGIOGRAM  03/28/2007    ANGIOGRAM  04/05/2010    ANGIOGRAM  06/06/2011    ANGIOGRAM  02/11/2013    ANGIOGRAM  02/01/2016    ANGIOPLASTY (CORONARY)  09/25/2017    stent to LAD    ANGIOPLASTY (CORONARY)  04/11/2017    stent to OM    ANGIOPLASTY (CORONARY)  02/05/2016    stent to OM    ANGIOPLASTY (CORONARY)  02/13/2013    stent to RCA    ANGIOPLASTY (CORONARY)  02/27/2013    Left subclavian angioplasty    ANGIOPLASTY (CORONARY)  07/13/2011    Left subclavian angioplasty/stent    ANGIOPLASTY (CORONARY)  06/08/2011    stent to RCA    ARTHROSCOPY OF JOINT UNLISTED      CABG  1996    mar lad/svg to om svg to rca    CARDIAC DEFIBRILLATOR PLACEMENT      Medtronic ICD, intermittently dependent    CARDIAC PACEMAKER PLACEMENT      CARPAL TUNNEL RELEASE  ??    CATH DRUG ELUTING STENT      CATH PERCUTANEOUS  TRANSLUMINAL CORONARY ANGIOPLASTY      x2 stents Feb/2016    CHOLECYSTECTOMY       COLONOSCOPY      KNEE ARTHROSCOPY  ?    OPEN HEART SURGERY ([facility])      [id number]    ORTHOPEDIC SURG ([facility])      KNEE AND CARPAL TUNNEL SURGERY    OTHER SURGICAL HISTORY Right 04/2016    right carotid endarterectomy    TONSILLECTOMY       Family History   Problem Relation Age of Onset    Heart Disorder Father     Diabetes Father     Heart Disease Father     Heart Disorder Mother     Cancer Mother         breast cancer    Breast Cancer Mother 80        80    Arthritis Mother     Cancer Sister         breast cancer    Breast Cancer Sister 50        50    Heart Disease Son     Heart Disease Brother     Heart Disease Brother          Allergies:  Allergies   Allergen Reactions    Statins MYALGIA, OTHER (SEE COMMENTS) and UNKNOWN     Weak in the knees    Elevated LFT's,muscle weakness    Colestid Flavored OTHER (SEE COMMENTS)       Medications:      Continuous Infusions:      Social History:   reports that she has never smoked. She has never used smokeless tobacco. She reports that she does not drink alcohol and does not use drugs.    Review of Systems:  All systems were reviewed and are negative except as described above in HPI.    Physical Exam:      Temp:  [97.4 °F (36.3 °C)] 97.4 °F (36.3 °C)  Pulse:  [69-83] 69  Resp:  [16-27] 27  BP: ()/(41-82) 119/54  SpO2:  [97 %-100 %] 98 %    Last 3 Weights   03/26/24 1028 148 lb (67.1 kg)   12/11/23 0934 155 lb (70.3 kg)   03/27/23 2125 160 lb 8 oz (72.8 kg)   03/27/23 1839 155 lb (70.3 kg)           General: No apparent distress  HEENT: No focal deficits.  Neck: supple. JVP normal  Cardiac: Regular rhythm, S1, S2 normal,   No rub or gallop.  No murmur.  Lungs: CTA  Abdomen: Soft, non-tender.   Extremities: No edema  Neurologic: no focal deficits  Skin: Warm and dry.          Telemetry: a paced    Laboratories and Data:  Diagnostics:    EKG, 3/26/2024:  a paced, NSSTTW changes    CXR, 3/26/2024:  reviewed    Labs:   HEM:  Recent Labs   Lab 03/26/24  1047   WBC 2.1*   HGB 12.8    PLT 80.0*       Chem:  Recent Labs   Lab 03/26/24  1047   *   K 4.4      CO2 26.0   BUN 30*   CREATSERUM 1.12*   CA 9.8   *       Recent Labs   Lab 03/26/24  1047   ALT 36   AST 27   ALB 3.4       No results for input(s): \"PTP\", \"INR\" in the last 168 hours.    No results for input(s): \"TROP\", \"CK\" in the last 168 hours.      Impression:   Progressive weakness, dyspnea, N/V, and diarrhea - I do not feel an anginal equivalent.  Suspect post chemo, rule infection or just dehydration.  CAD, prior CABG, PCI's - last cath Dec 2023 in [facility].  Plan was to consider  in April.  Ischemic CM, EF 40-45%  Endometrial CA, ongoing chemo.  H/o HTN  CRI  PAF - on eliquis.    Plan:  Admit.  Gentle hydration.  Echo with doppler.  Continue metoprolol, hold entresto.  Same ASA and eliquis.      Chon Sotelo MD  3/26/2024  2:24 PM  C5

## 2024-03-26 NOTE — ED INITIAL ASSESSMENT (HPI)
PT TO THE ED WITH C/O DIFFICULTY BREATHING X 5 DAYS. N/V X 1 DAY. NO HX OF BLOOD CLOTS, BUT HAS EXTENSIVE CARDIAC HX.

## 2024-03-26 NOTE — H&P
[facility]     Sol Dee Patient Status:  Emergency    3/11/1940 MRN [id number]   Location [facility] EMERGENCY DEPARTMENT Attending Darío Hall MD   Hosp Day # 0 PCP Artemio Reeder MD     Chief Complaint: Shortness of breath    Subjective:    History of Present Illness:     Sol Dee is a 84 year old female with past medical history hepatitis C, CAD status post CABG, autoimmune hepatitis, CKD 3, hypertension, hyperlipidemia, DM2, primary biliary cirrhosis, endometrial cancer who presents with shortness of breath.  Had a cath in December with  of RCA and occluded SVG.  For the past 5 days has been short of breath with exertion and very weak.  Associated with some nausea vomiting and diarrhea.  Denies chest pain or lower extremity edema.  Just finished her last round of chemo last week. Diarrhea lasted 3 days, resolved today.     History/Other:    Past Medical History:  Past Medical History:   Diagnosis Date    Acute hepatitis C without mention of hepatic coma(070.51)     Za genotype , bx     AICD (automatic cardioverter/defibrillator) present 2013    Anemia     ASHD (arteriosclerotic heart disease)     s/p CABG-complex RCA stent  R SC stent ; status post angioplasty and drug-eluting stent 2015;    S/p PCI/MEHDI [id number]    Atherosclerosis of coronary artery     Autoimmune hepatitis (HCC)     Cardiomyopathy (HCC)     EF 40%    CKD (chronic kidney disease)     2-3    Coronary atherosclerosis of unspecified type of vessel, native or graft     CVD (cerebrovascular disease)     R 50% L <50% R vert > 50%; status post right carotid endarterectomy 2016    Depression     Elevated LFTs     hep c , bx 3/10    Essential hypertension ?    Glucose intolerance (pre-diabetes)     Heart attack (HCC)     Heart disease ?[id number]    bypass/pacemaker/stents/TCO-    High blood pressure     High cholesterol     History of right shoulder fracture  2019    Hyperlipemia     Hyperlipidemia     Hypertension     b/p check R arm    Incontinence     Osteopenia     DEXA 10/2012-lumbar spine T score -1.9, femoral neck T score -1.4; history of right shoulder fracture 2019    Positive VALARIE (antinuclear antibody)     Primary biliary cirrhosis (HCC)     PVD (peripheral vascular disease) (HCC)     Shortness of breath     Stage 3 chronic kidney disease (HCC)     Subclavian steal syndrome     Thrombocytopenia (HCC)     Type 2 diabetes mellitus with diabetic chronic kidney disease (HCC) 01/07/2016    Type 2 diabetes mellitus with stage 3 chronic kidney disease, without long-term current use of insulin (HCC)     Type II or unspecified type diabetes mellitus without mention of complication, not stated as uncontrolled (aka 96035)     Viral hepatitis C ??    went through treatment    Visual impairment      Past Surgical History:   Past Surgical History:   Procedure Laterality Date    ANGIOGRAM  03/01/1996    done @[facility]; medical [facility]    ANGIOGRAM  06/03/1996    done [facility]; medical therapy    ANGIOGRAM  03/03/1997    ANGIOGRAM  03/28/2007    ANGIOGRAM  04/05/2010    ANGIOGRAM  06/06/2011    ANGIOGRAM  02/11/2013    ANGIOGRAM  02/01/2016    ANGIOPLASTY (CORONARY)  09/25/2017    stent to LAD    ANGIOPLASTY (CORONARY)  04/11/2017    stent to OM    ANGIOPLASTY (CORONARY)  02/05/2016    stent to OM    ANGIOPLASTY (CORONARY)  02/13/2013    stent to RCA    ANGIOPLASTY (CORONARY)  02/27/2013    Left subclavian angioplasty    ANGIOPLASTY (CORONARY)  07/13/2011    Left subclavian angioplasty/stent    ANGIOPLASTY (CORONARY)  06/08/2011    stent to RCA    ARTHROSCOPY OF JOINT UNLISTED      CABG  1996    mar lad/svg to om svg to rca    CARDIAC DEFIBRILLATOR PLACEMENT      Medtronic ICD, intermittently dependent    CARDIAC PACEMAKER PLACEMENT      CARPAL TUNNEL RELEASE  ??    CATH DRUG ELUTING STENT      CATH PERCUTANEOUS  TRANSLUMINAL CORONARY  ANGIOPLASTY      x2 stents Feb/2016    CHOLECYSTECTOMY      COLONOSCOPY      KNEE ARTHROSCOPY  ?    OPEN HEART SURGERY ([facility])      [id number]    ORTHOPEDIC SURG ([facility])      KNEE AND CARPAL TUNNEL SURGERY    OTHER SURGICAL HISTORY Right 04/2016    right carotid endarterectomy    TONSILLECTOMY        Family History:   Family History   Problem Relation Age of Onset    Heart Disorder Father     Diabetes Father     Heart Disease Father     Heart Disorder Mother     Cancer Mother         breast cancer    Breast Cancer Mother 80        80    Arthritis Mother     Cancer Sister         breast cancer    Breast Cancer Sister 50        50    Heart Disease Son     Heart Disease Brother     Heart Disease Brother      Social History:    reports that she has never smoked. She has never used smokeless tobacco. She reports that she does not drink alcohol and does not use drugs.     Allergies:   Allergies   Allergen Reactions    Statins MYALGIA, OTHER (SEE COMMENTS) and UNKNOWN     Weak in the knees    Elevated LFT's,muscle weakness    Colestid Flavored OTHER (SEE COMMENTS)       Medications:    No current facility-administered medications on file prior to encounter.     Current Outpatient Medications on File Prior to Encounter   Medication Sig Dispense Refill    apixaban 5 MG Oral Tab Take 1 tablet (5 mg total) by mouth 2 (two) times daily.      sacubitril-valsartan (ENTRESTO) 24-26 MG Oral Tab Take 1 tablet by mouth 2 (two) times daily. 60 tablet 3    metoprolol succinate ER 25 MG Oral Tablet 24 Hr Take 1 tablet (25 mg total) by mouth nightly.      FLUoxetine HCl 20 MG Oral Cap Take 1 capsule (20 mg total) by mouth every morning. 90 capsule 3    Evolocumab 140 MG/ML Subcutaneous Solution Prefilled Syringe Inject 1 mL into the skin every 14 (fourteen) days.      LANCETS MICRO THIN 33G Does not apply Misc 1 each by Does not apply route daily. E11.22 (Patient not taking: Reported on 3/27/2023) 100 each 3    aspirin 81 MG Oral Tab Take 1  tablet (81 mg total) by mouth daily.      Magnesium 100 MG Oral Tab Take 1 tablet (100 mg total) by mouth daily.      Multiple Vitamins-Iron (MULTIVITAMIN/IRON) Oral Tab Take 1 tablet by mouth daily.      Omega-3 Fatty Acids (FISH OIL) 1000 MG Oral Cap Take 1,000 mg by mouth daily.      ascorbic acid (VITAMIN C) 500 MG Oral Tab Take 1 tablet (500 mg total) by mouth daily.      Cholecalciferol (VITAMIN D) 1000 UNITS Oral Cap Take 1,000 Units by mouth daily.           Review of Systems:   A comprehensive review of systems was completed.    Pertinent positives and negatives noted in the HPI.    Objective:   Physical Exam:    /54   Pulse 69   Temp 97.4 °F (36.3 °C) (Temporal)   Resp (!) 27   Ht 5' 1\" (1.549 m)   Wt 148 lb (67.1 kg)   SpO2 98%   BMI 27.96 kg/m²   General: No acute distress, Alert  Respiratory: No rhonchi, no wheezes  Cardiovascular: S1, S2. Regular rate and rhythm  Abdomen: Soft, Non-tender, non-distended, positive bowel sounds  Neuro: No new focal deficits  Extremities: No edema      Results:    Labs:      Labs Last 24 Hours:    Recent Labs   Lab 03/26/24  1047   RBC 4.14   HGB 12.8   HCT 35.4   MCV 85.5   MCH 30.9   MCHC 36.2   RDW 12.8   NEPRELIM 0.93*   WBC 2.1*   PLT 80.0*       Recent Labs   Lab 03/26/24  1047   *   BUN 30*   CREATSERUM 1.12*   EGFRCR 48*   CA 9.8   ALB 3.4   *   K 4.4      CO2 26.0   ALKPHO 78   AST 27   ALT 36   BILT 1.0   TP 6.6       Lab Results   Component Value Date    INR 1.03 02/13/2023    INR 1.01 04/04/2016       Recent Labs   Lab 03/26/24  1047   TROPHS 20       No results for input(s): \"TROP\", \"PBNP\" in the last 168 hours.    No results for input(s): \"PCT\" in the last 168 hours.    Imaging: Imaging data reviewed in Epic.    Assessment & Plan:      # Shortness of breath, generalized weakness  -Suspect secondary to chemo which just completed last week with dehydration vs PNA as below  -Cards on consult, does not feel this is  cardiac  -Echo  -IV fluids  -PT/OT    #dehydration  #diarrhea  #orthostatic hypotension  -if recurrs, check cdiff, stool PCR  -IVF  -orthostatics q shift    #Left lower lobe atelectasis versus pneumonia  -neg  procal  -empiric abx  -cxs. If bcxs negative, will consider stopped abx  -repeat 2V CXR tomorrow  -IVF    #CAD status post CABG  -Known  of RCA and occluded SVG  -Echo  -eliquis    #History of hepatitis C  #Autoimmune hepatitis  #CKD 3  #Hypertension  #Hyperlipidemia  #DM2  -Insulin sliding scale  #Primary biliary cirrhosis  #Endometrial cancer        Plan of care discussed with patient, ED physician    Rodo Baldwin MD    Supplementary Documentation:     The 21st Century Cures Act makes medical notes like these available to patients in the interest of transparency. Please be advised this is a medical document. Medical documents are intended to carry relevant information, facts as evident, and the clinical opinion of the practitioner. The medical note is intended as peer to peer communication and may appear blunt or direct. It is written in medical language and may contain abbreviations or verbiage that are unfamiliar.

## 2024-03-27 ENCOUNTER — APPOINTMENT (OUTPATIENT)
Dept: GENERAL RADIOLOGY | Facility: HOSPITAL | Age: 84
End: 2024-03-27
Attending: HOSPITALIST
Payer: MEDICARE

## 2024-03-27 ENCOUNTER — APPOINTMENT (OUTPATIENT)
Dept: CV DIAGNOSTICS | Facility: HOSPITAL | Age: 84
End: 2024-03-27
Attending: INTERNAL MEDICINE
Payer: MEDICARE

## 2024-03-27 PROBLEM — D61.810 ANTINEOPLASTIC CHEMOTHERAPY INDUCED PANCYTOPENIA (CODE) (HCC): Status: ACTIVE | Noted: 2024-03-27

## 2024-03-27 PROBLEM — D61.810 ANTINEOPLASTIC CHEMOTHERAPY INDUCED PANCYTOPENIA (CODE): Status: ACTIVE | Noted: 2024-03-27

## 2024-03-27 PROBLEM — C54.1 ENDOMETRIAL CANCER (HCC): Status: ACTIVE | Noted: 2024-03-27

## 2024-03-27 PROBLEM — I48.0 PAROXYSMAL ATRIAL FIBRILLATION (HCC): Status: ACTIVE | Noted: 2024-03-27

## 2024-03-27 LAB
ALBUMIN SERPL-MCNC: 2.7 G/DL (ref 3.4–5)
ALBUMIN/GLOB SERPL: 1.1 {RATIO} (ref 1–2)
ALP LIVER SERPL-CCNC: 65 U/L
ALT SERPL-CCNC: 28 U/L
ANION GAP SERPL CALC-SCNC: 5 MMOL/L (ref 0–18)
AST SERPL-CCNC: 23 U/L (ref 15–37)
BASOPHILS # BLD AUTO: 0.01 X10(3) UL (ref 0–0.2)
BASOPHILS NFR BLD AUTO: 0.6 %
BILIRUB SERPL-MCNC: 0.7 MG/DL (ref 0.1–2)
BUN BLD-MCNC: 26 MG/DL (ref 9–23)
C DIFF TOX B STL QL: NEGATIVE
CALCIUM BLD-MCNC: 8.4 MG/DL (ref 8.5–10.1)
CHLORIDE SERPL-SCNC: 112 MMOL/L (ref 98–112)
CO2 SERPL-SCNC: 26 MMOL/L (ref 21–32)
CREAT BLD-MCNC: 0.84 MG/DL
EGFRCR SERPLBLD CKD-EPI 2021: 68 ML/MIN/1.73M2 (ref 60–?)
EOSINOPHIL # BLD AUTO: 0.15 X10(3) UL (ref 0–0.7)
EOSINOPHIL NFR BLD AUTO: 9.6 %
ERYTHROCYTE [DISTWIDTH] IN BLOOD BY AUTOMATED COUNT: 12.9 %
GLOBULIN PLAS-MCNC: 2.5 G/DL (ref 2.8–4.4)
GLUCOSE BLD-MCNC: 144 MG/DL (ref 70–99)
GLUCOSE BLD-MCNC: 147 MG/DL (ref 70–99)
GLUCOSE BLD-MCNC: 179 MG/DL (ref 70–99)
GLUCOSE BLD-MCNC: 90 MG/DL (ref 70–99)
GLUCOSE BLD-MCNC: 98 MG/DL (ref 70–99)
HCT VFR BLD AUTO: 29.2 %
HGB BLD-MCNC: 10 G/DL
IMM GRANULOCYTES # BLD AUTO: 0 X10(3) UL (ref 0–1)
IMM GRANULOCYTES NFR BLD: 0 %
LYMPHOCYTES # BLD AUTO: 0.87 X10(3) UL (ref 1–4)
LYMPHOCYTES NFR BLD AUTO: 55.8 %
MAGNESIUM SERPL-MCNC: 1.3 MG/DL (ref 1.6–2.6)
MCH RBC QN AUTO: 30.8 PG (ref 26–34)
MCHC RBC AUTO-ENTMCNC: 34.2 G/DL (ref 31–37)
MCV RBC AUTO: 89.8 FL
MONOCYTES # BLD AUTO: 0.1 X10(3) UL (ref 0.1–1)
MONOCYTES NFR BLD AUTO: 6.4 %
NEUTROPHILS # BLD AUTO: 0.43 X10 (3) UL (ref 1.5–7.7)
NEUTROPHILS # BLD AUTO: 0.43 X10(3) UL (ref 1.5–7.7)
NEUTROPHILS NFR BLD AUTO: 27.6 %
OSMOLALITY SERPL CALC.SUM OF ELEC: 300 MOSM/KG (ref 275–295)
PLATELET # BLD AUTO: 52 10(3)UL (ref 150–450)
PLATELETS.RETICULATED NFR BLD AUTO: 9.5 % (ref 0–7)
POTASSIUM SERPL-SCNC: 4 MMOL/L (ref 3.5–5.1)
PROT SERPL-MCNC: 5.2 G/DL (ref 6.4–8.2)
RBC # BLD AUTO: 3.25 X10(6)UL
SODIUM SERPL-SCNC: 143 MMOL/L (ref 136–145)
WBC # BLD AUTO: 1.6 X10(3) UL (ref 4–11)

## 2024-03-27 PROCEDURE — 99232 SBSQ HOSP IP/OBS MODERATE 35: CPT | Performed by: HOSPITALIST

## 2024-03-27 PROCEDURE — 71046 X-RAY EXAM CHEST 2 VIEWS: CPT | Performed by: HOSPITALIST

## 2024-03-27 PROCEDURE — 93306 TTE W/DOPPLER COMPLETE: CPT | Performed by: INTERNAL MEDICINE

## 2024-03-27 PROCEDURE — 99223 1ST HOSP IP/OBS HIGH 75: CPT | Performed by: NURSE PRACTITIONER

## 2024-03-27 NOTE — PLAN OF CARE
Skin assessment completed with two nurses: Machelle and Marce. Patient has no skin breakdown, Her skin is totally intact. We only noticed a small bruise on her right hip. She has 5 small scars from her hysterectomy done in December. These five tiny scars are totally healed.

## 2024-03-27 NOTE — OCCUPATIONAL THERAPY NOTE
OCCUPATIONAL THERAPY EVALUATION - INPATIENT    Room Number: 8619/8619-A  Evaluation Date: 3/27/2024     Type of Evaluation: Initial  Presenting Problem: Dyspnea    Physician Order: IP Consult to Occupational Therapy  Reason for Therapy:  ADL/IADL Dysfunction and Discharge Planning      OCCUPATIONAL THERAPY ASSESSMENT   Patient is a 84 year old female admitted on 3/26/2024 with Presenting Problem: Dyspnea. Co-Morbidities : Endometrial CA s/p chemo, a. fib, CHF, HTN, CKD, CAD s/p CABG, ICD, cirrhosis, hepatits C, DM (s/p total hysterectomy, salpingooectomy)  Patient is currently functioning near baseline with toileting, lower body dressing, bed mobility, transfers, stating sitting balance, dynamic sitting balance, static standing balance, dynamic standing balance, and functional standing tolerance.  Prior to admission, patient's baseline is IND with BADLs and functional mobility.  Patient met all OT goals at supervision level 2/2 reports of dizziness.  Patient reports no further questions/concerns at this time.     WEIGHT BEARING RESTRICTION  Weight Bearing Restriction: None                Recommendations for nursing staff:   Transfers: 1p CGA c. FWW  Toileting location: Toilet    EVALUATION SESSION:  Patient at start of session: Semi-supine in bed  FUNCTIONAL TRANSFER ASSESSMENT  Sit to Stand: Edge of Bed  Edge of Bed: Stand-by Assist  Commode Transfer: Stand-by Assist    BED MOBILITY  Rolling: Modified Independent  Supine to Sit : Modified Independent  Sit to Supine (OT): Modified Independent  Scooting: IND to scoot EOB    BALANCE ASSESSMENT  Static Sitting: Independent  Sitting Unilateral: Independent  Static Standing: Modified Independent  Standing Bilateral: Modified Independent    FUNCTIONAL ADL ASSESSMENT  LB Dressing Seated: Modified Independent  LB Dressing Standing: Supervision (sup for safety 2/2 c/o dizziness)  Toileting Seated: Supervision (stood for pericares c. sup for safety)      ACTIVITY TOLERANCE:  Impaired 2/2 dizziness  Pulse: 72 (72 sitting, 78 standing)  Heart Rate Source: Monitor     BP: 113/56 (104/74 sitting EOB)  BP Location: Right arm  BP Method: Automatic  Patient Position: Lying    O2 SATURATIONS       COGNITION  Overall Cognitive Status:  WFL - within functional limits  COGNITION ASSESSMENTS       Upper Extremity:   ROM: within functional limits   Strength: is within functional limits   Coordination:  Gross motor: WFL  Fine motor: WFL  Sensation: Light touch:  intact    EDUCATION PROVIDED  Patient: Role of Occupational Therapy; Plan of Care; DME Recommendations; Functional Transfer Techniques; Fall Prevention; Compensatory ADL Techniques  Patient's Response to Education: Verbalized Understanding; Returned Demonstration    Equipment used: FWW, commode  Demonstrates functional use    Therapist comments: Pt pleasant and agreeable to therapy. Reported urge to urinate. Orthostatics taken in supine and sitting, no significant drop. Reported c/o dizziness. Assisted SBA c. FWW to commode on L side from EOB. Pt completed pericares and LBD in standing after toileting with supervision for safety. Educated pt on alternative approaches to LBD for safety 2/2 dizziness.     Patient End of Session: In bed;Needs met;Call light within reach;All patient questions and concerns addressed;Alarm set    OCCUPATIONAL PROFILE    HOME SITUATION  Type of Home: House  Home Layout: Two level  Lives With: Alone    Toilet and Equipment: Standard height toilet  Shower/Tub and Equipment: Tub-shower combo;Shower chair  Other Equipment: None          Drives: Yes       Prior Level of Function: IND with BADLs and functional mobility    SUBJECTIVE  \"I need to use the bathroom\"    PAIN ASSESSMENT  Ratin  Location: None reported or observed       OBJECTIVE  Precautions: Bed/chair alarm  Fall Risk: High fall risk    WEIGHT BEARING RESTRICTION  Weight Bearing Restriction: None                AM-PAC ‘6-Clicks’ Inpatient Daily Activity  Short Form  -   Putting on and taking off regular lower body clothing?: A Little  -   Bathing (including washing, rinsing, drying)?: A Little  -   Toileting, which includes using toilet, bedpan or urinal? : A Little  -   Putting on and taking off regular upper body clothing?: None  -   Taking care of personal grooming such as brushing teeth?: None  -   Eating meals?: None    AM-PAC Score:  Score: 21  Approx Degree of Impairment: 32.79%  Standardized Score (AM-PAC Scale): 44.27      ADDITIONAL TESTS     NEUROLOGICAL FINDINGS        PLAN   Patient has been evaluated and presents with no skilled Occupational Therapy needs at this time.  Patient discharged from Occupational Therapy services.  Please re-order if a new functional limitation presents during this admission.      Patient Evaluation Complexity Level:   Occupational Profile/Medical History LOW - Brief history including review of medical or therapy records    Specific performance deficits impacting engagement in ADL/IADL LOW  1 - 3 performance deficits    Client Assessment/Performance Deficits LOW - No comorbidities nor modifications of tasks    Clinical Decision Making LOW - Analysis of occupational profile, problem-focused assessments, limited treatment options    Overall Complexity LOW     OT Session Time: 15 minutes  Self-Care Home Management: 0 minutes  Therapeutic Activity: 7 minutes  Neuromuscular Re-education: 0 minutes  Therapeutic Exercise: 0 minutes  Cognitive Skills: 0 minutes  Sensory Integrative: 0 minutes  Orthotic Management and Trainin minutes  Can add/delete any of these

## 2024-03-27 NOTE — CM/SW NOTE
HH referrals sent in Aidin. PT/OT anticipates HH at discharge. Await options to provide choice list.     AURE Yoon, LSW  Discharge Planner  [id number]

## 2024-03-27 NOTE — PLAN OF CARE
Problem: Diabetes/Glucose Control  Goal: Glucose maintained within prescribed range  Description: INTERVENTIONS:  - Monitor Blood Glucose as ordered  - Assess for signs and symptoms of hyperglycemia and hypoglycemia  - Administer ordered medications to maintain glucose within target range  - Assess barriers to adequate nutritional intake and initiate nutrition consult as needed  - Instruct patient on self management of diabetes  Outcome: Progressing

## 2024-03-27 NOTE — PROGRESS NOTES
[facility] Pharmacy Note:  Renal Adjustment for ampicillin/sulbactam (UNASYN)    Sol Dee is a 84 year old patient who has been prescribed ampicillin/sulbactam (UNASYN) 3000 mg every 12 hrs.  The estimated creatinine clearance is 37.6 mL/min (based on SCr of 0.84 mg/dL). The dose has been adjusted to ampicillin/sulbactam (UNASYN) 3000 mg every 6 hrs per hospital renal dose adjustment protocol for treatment of pneumonia.  Pharmacy will follow and adjust dose as warranted for additional renal function changes.    Thank you,    Miriam Paul PharmD  3/27/2024  2:29 PM

## 2024-03-27 NOTE — PROGRESS NOTES
[facility]   part of [facility]     Hospitalist Progress Note     Sol Dee Patient Status:  Observation    3/11/1940 MRN [id number]   [facility] 8NE-A Attending Rodo Baldwin MD   Hosp Day # 0 PCP Artemio Reeder MD     Chief Complaint: weakness    Subjective:     Patient no sob. Still very weak    Objective:    Review of Systems:   A comprehensive review of systems was completed; pertinent positive and negatives stated in subjective.    Vital signs:  Temp:  [97.8 °F (36.6 °C)-98.1 °F (36.7 °C)] 98.1 °F (36.7 °C)  Pulse:  [69-85] 72  Resp:  [16-27] 18  BP: ()/(40-84) 113/56  SpO2:  [95 %-100 %] 95 %    Physical Exam:    General: No acute distress  Respiratory: No wheezes, no rhonchi  Cardiovascular: S1, S2, regular rate and rhythm  Abdomen: Soft, Non-tender, non-distended, positive bowel sounds  Neuro: No new focal deficits.   Extremities: No edema      Diagnostic Data:    Labs:  Recent Labs   Lab 24  1047 24  0554   WBC 2.1* 1.6*   HGB 12.8 10.0*   MCV 85.5 89.8   PLT 80.0* 52.0*       Recent Labs   Lab 24  1047 24  0554   * 90   BUN 30* 26*   CREATSERUM 1.12* 0.84   CA 9.8 8.4*   ALB 3.4 2.7*   * 143   K 4.4 4.0    112   CO2 26.0 26.0   ALKPHO 78 65   AST 27 23   ALT 36 28   BILT 1.0 0.7   TP 6.6 5.2*       Estimated Creatinine Clearance: 37.6 mL/min (based on SCr of 0.84 mg/dL).    Recent Labs   Lab 24  1047 24  1711 24   TROPHS 20 22 22       No results for input(s): \"PTP\", \"INR\" in the last 168 hours.               Microbiology    No results found for this visit on 24.      Imaging: Reviewed in Epic.    Medications:    apixaban  5 mg Oral BID    aspirin  81 mg Oral Nightly    FLUoxetine  20 mg Oral QAM    metoprolol succinate ER  50 mg Oral Nightly    [Held by provider] sacubitril-valsartan  1 tablet Oral BID    insulin aspart  2-10 Units Subcutaneous TID AC and HS    azithromycin  500 mg  Intravenous Q24H    ampicillin-sulbactam  3 g Intravenous q12h       Assessment & Plan:      # Shortness of breath, generalized weakness  -Suspect secondary to chemo which just completed last week with dehydration vs PNA as below  -Cards on consult, does not feel this is cardiac  -Echo  -IV fluids  -PT/OT     #dehydration  #diarrhea  #orthostatic hypotension  -cdiff neg, check stool PCR  -IVF  -orthostatics q shift     #Left lower lobe atelectasis versus pneumonia  -neg  procal  -empiric abx  -cxs. If bcxs negative, will consider stopped abx  -repeat 2V CXR today   -IVF    #neutropenia  #thrombocytopenia  -counts dropping 2/2 chemo  -consult heme/onc  -if plts <50, hold AC  -transfuse <10     #CAD status post CABG  -Known  of RCA and occluded SVG  -Echo  -eliquis     #History of hepatitis C  #Autoimmune hepatitis  #CKD 3  #Hypertension  #Hyperlipidemia  #DM2  -Insulin sliding scale  #Primary biliary cirrhosis  #Endometrial cancer      Rodo Baldwin MD    Supplementary Documentation:     Quality:  DVT Mechanical Prophylaxis:   SCDs,    DVT Pharmacologic Prophylaxis   Medication    apixaban (Eliquis) tab 5 mg         DVT Pharmacologic prophylaxis: Aspirin 81 mg      Code Status: Prior  Lopez: No urinary catheter in place  Lopez Duration (in days):   Central line:    ROSELIA: 3/29/2024    Discharge is dependent on: course  At this point Ms. Dee is expected to be discharge to: home    The 21st Century Cures Act makes medical notes like these available to patients in the interest of transparency. Please be advised this is a medical document. Medical documents are intended to carry relevant information, facts as evident, and the clinical opinion of the practitioner. The medical note is intended as peer to peer communication and may appear blunt or direct. It is written in medical language and may contain abbreviations or verbiage that are unfamiliar.

## 2024-03-27 NOTE — PHYSICAL THERAPY NOTE
PHYSICAL THERAPY EVALUATION - INPATIENT     Room Number: 8619/8619-A  Evaluation Date: 3/27/2024  Type of Evaluation: Initial  Physician Order: PT Eval and Treat    Presenting Problem: dyspnea, dehydration  Co-Morbidities : Endometrial CA s/p chemo, a. fib, CHF, HTN, CKD, CAD s/p CABG, ICD, cirrhosis, hepatits C, DM (s/p total hysterectomy, salpingooectomy)  Reason for Therapy: Mobility Dysfunction and Discharge Planning    PHYSICAL THERAPY ASSESSMENT   Patient is currently functioning near baseline with bed mobility and transfers.  Prior to admission, patient's baseline is independent.  Patient is requiring contact guard assist as a result of the following impairments: medical status and orthostatic hypotension .  Physical Therapy will continue to follow for duration of hospitalization.    Patient will benefit from continued skilled PT Services at discharge to promote functional independence in home.  Anticipate patient will return home with home health PT.    PLAN  PT Treatment Plan: Bed mobility;Endurance;Energy conservation;Patient education;Gait training;Strengthening;Balance training;Transfer training;Stair training  Rehab Potential : Good  Frequency (Obs): 3-5x/week  Number of Visits to Meet Established Goals: 3      CURRENT GOALS    Goal #1 Patient is able to demonstrate supine - sit EOB @ level: supervision- MET     Goal #2 Patient is able to demonstrate transfers EOB to/from BSC at assistance level: supervision     Goal #3 Patient is able to ambulate 150 feet with assist device: LRAD at assistance level: supervision     Goal #4 Pt will ascend/descend 1 flight of stairs with supervision   Goal #5    Goal #6    Goal Comments: Goals established on 3/27/2024      PHYSICAL THERAPY MEDICAL/SOCIAL HISTORY  History related to current admission: Patient is a 84 year old female admitted on 3/26/2024 from home for dyspnea.  Pt diagnosed with dehydration, possible pneumonia, and orthostatic  hypotension.      HOME SITUATION  Type of Home: House   Home Layout: Two level                Lives With: Alone  Drives: Yes          Prior Level of [place]: Pt lives alone in 2 story home. Pt independent with ADL and mobility. Pt does not use RW or cane at baseline. Pt reports supportive children that live nearby,     SUBJECTIVE  \"I got dizzy when I got up earlier.\"       OBJECTIVE  Precautions: Bed/chair alarm  Fall Risk: High fall risk    WEIGHT BEARING RESTRICTION  Weight Bearing Restriction: None                PAIN ASSESSMENT  Ratin          COGNITION  Overall Cognitive Status:  WFL - within functional limits    RANGE OF MOTION AND STRENGTH ASSESSMENT  Upper extremity ROM and strength are within functional limits     Lower extremity ROM is within functional limits     Lower extremity strength is within functional limits       BALANCE  Static Sitting: Good  Dynamic Sitting: Good  Static Standing: Fair -  Dynamic Standing: Poor +    ADDITIONAL TESTS                                    ACTIVITY TOLERANCE            BP supine: 113/56  BP sittin/74             O2 WALK       NEUROLOGICAL FINDINGS                        AM-PAC '6-Clicks' INPATIENT SHORT FORM - BASIC MOBILITY  How much difficulty does the patient currently have...  Patient Difficulty: Turning over in bed (including adjusting bedclothes, sheets and blankets)?: None   Patient Difficulty: Sitting down on and standing up from a chair with arms (e.g., wheelchair, bedside commode, etc.): A Little   Patient Difficulty: Moving from lying on back to sitting on the side of the bed?: None   How much help from another person does the patient currently need...   Help from Another: Moving to and from a bed to a chair (including a wheelchair)?: A Little   Help from Another: Need to walk in hospital room?: A Little   Help from Another: Climbing 3-5 steps with a railing?: A Little       AM-PAC Score:  Raw Score: 20   Approx Degree of Impairment: 35.83%    Standardized Score (AM-PAC Scale): 47.67   CMS Modifier (G-Code): CJ    FUNCTIONAL ABILITY STATUS  Gait Assessment   Functional Mobility/Gait Assessment  Gait Assistance: Contact guard assist  Distance (ft): 3 x 2  Assistive Device: Rolling walker    Skilled Therapy Provided     Bed Mobility:  Rolling: MOD I  Supine to sit: MOD I   Sit to supine: MOD I     Transfer Mobility:  Sit to stand: CGA   Stand to sit: CGA  Gait = CGA    There-Act:  Bed mobility with MOD I.   VC for pacing and safety due to recent hypotension.   Sit-stand with RW and CGA for safety.   Transferred to bedside commode with RW and CGA for safety.   Reports mild dizziness.   Transferred back to bedside with RW and CGA.   Pt ambulates with steady step through gait pattern.     Therapist's Comments: Session limited by recent significant orthostatic hypotension. BP stable in supine and sitting this session.     Exercise/Education Provided:  Bed mobility  Energy conservation  Functional activity tolerated  Gait training  Posture  Strengthening  Transfer training    Patient End of Session: In bed;Needs met;Call light within reach;RN aware of session/findings;All patient questions and concerns addressed;Alarm set      Patient Evaluation Complexity Level:  History High - 3 or more personal factors and/or co-morbidities   Examination of body systems Moderate - addressing a total of 3 or more elements   Clinical Presentation Moderate - Evolving   Clinical Decision Making Moderate - Evolving       PT Session Time: 25 minutes  Gait Training:  minutes  Therapeutic Activity: 15 minutes  Neuromuscular Re-education:  minutes  Therapeutic Exercise:  minutes

## 2024-03-27 NOTE — DIETARY NOTE
[facility]   part of [facility]    NUTRITION ASSESSMENT    Pt does not meet malnutrition criteria at this time.      NUTRITION INTERVENTION:  Meal and Snacks - Monitor and encourage adequate PO intake.   Medical Food Supplements - Pt not interested at this time. Rationale/use for oral supplements discussed.      PATIENT STATUS: 03/27/24 84 year old female with shortness of breath. Pt on chemo for endometrial CA. Per MD, symptoms less likely to cardiac related. Chart reviewed due to +MST. Pt reports recent diarrhea and decreased PO intake. Diet appropriate. Declined ONS, feels like it fills her up too much. Denies mouth pain or abd pain. No diet intolerances. #. No significant wt changes. Skin intact.     PMH hepatitis C, CAD status post CABG, CKD 3, hypertension, hyperlipidemia, DM2, primary biliary cirrhosis, endometrial cancer     ANTHROPOMETRICS:  Ht: 154.9 cm (5' 1\")  Wt: 68 kg (149 lb 14.6 oz).   BMI: Body mass index is 28.33 kg/m².  IBW: 48 kg      WEIGHT HISTORY:   Weight loss: Yes, 6# (3.8%) x 3 months- not significant    Wt Readings from Last 10 Encounters:   03/26/24 68 kg (149 lb 14.6 oz)   12/11/23 70.3 kg (155 lb)   03/27/23 72.8 kg (160 lb 8 oz)   02/15/23 69 kg (152 lb 1.9 oz)   03/28/22 72.1 kg (159 lb)   04/12/21 72.1 kg (159 lb)   02/17/20 71.7 kg (158 lb)   07/29/19 70.3 kg (155 lb)   07/02/19 70.8 kg (156 lb)   04/30/19 71.2 kg (157 lb)        NUTRITION:  Diet:       Procedures    Regular/General diet Is Patient on Accuchecks? No      Food Allergies: No  Cultural/Ethnic/Mormonism Preferences Addressed: Yes    Percent Meals Eaten (last 3 days)       Date/Time Percent Meals Eaten (%)    03/26/24 2022 75 %    03/27/24 0848 100 %            GI system review: diarrhea resolving  Last BM: 3/26  Skin and wounds: intact    NUTRITION RELATED PHYSICAL FINDINGS:     1. Body Fat/Muscle Mass: no wasting noted     2. Fluid Accumulation: none per RN documentation     NUTRITION PRESCRIPTION:  68kg  Calories: 7458-0722 calories/day (25-30 kcal/kg)  Protein:  grams protein/day (1.0-1.5 grams protein per kg)  Fluid: ~1 ml/kcal or per MD discretion    NUTRITION DIAGNOSIS/PROBLEM:  Inadequate oral intake related to decreased intake as evidenced by documented/reported insufficient oral intake and documented/reported unintentional weight loss      MONITOR AND EVALUATE/NUTRITION GOALS:  PO intake of 75% of meals TID - New  Weight stable within 1 to 2 lbs during admission - New      MEDICATIONS:  Abx, novolog    LABS:  Reviewed    Pt is at Moderate nutrition risk    Nneka Pandey RD, LDN  Clinical Nutrition  [id number]

## 2024-03-27 NOTE — CONSULTS
Hem/Onc Report of Consultation    Patient Name: Sol Dee   YOB: 1940   Medical Record Number: [id number]   CSN: [id number]   Consulting Physician: Dr. Jason Quinn  Referring Provider(s): Dr. Rodo Baldwin  Date of Consultation: 3/27/2024     The 21st Century Cures Act makes medical notes like these available to patients in the interest of transparency. Please be advised this is a medical document. Medical documents are intended to carry relevant information, facts as evident, and the clinical opinion of the practitioner. The medical note is intended as peer to peer communication and may appear blunt or direct. It is written in medical language and may contain abbreviations or verbiage that are unfamiliar.     Reason for Consultation: Neutropenia/Thrombocytopenia sp chemo    Chief Complaint:   Chief Complaint   Patient presents with    Difficulty Breathing       History of Present Illness    Sol Dee is an 84 year old female with multiple co morbidities including recent diagnosis of stage IA carcinosarcoma of the uterus (January 2024 at [facility], currently receiving chemotherapy as an outpatient under the kind care of Dr. Ortiz) who presented to the emergency room on March 26, 2024, with a five day history of dyspnea along with 24 hours of nausea and vomiting. She has a significant cardiac history of which includes prior CABG and PCIs and was most recently evaluated with a heart cath on December 13, 2023, where she was found to have  of RCA with occluded SVG per review of cardiology notes. Troponin negative in ED, however patient was concerned about ACS and after discussion with cardiology, patient was admitted for serial troponins and monitoring. Other labs were notable for thrombocytopenia and neutropenia in the ER. Chest xray revealed retrocardiac atelectasis, scar, and/or early consolidative process of LLL. Per chart review, patient received her third of three planned cycles of  carboplatin and taxol on March 19, 2024. Hematology has been consulted to weigh in on management of thrombocytopenia and neutropenia post chemotherapy.     Patient was seen resting in bed, offers no complaints. Tells me she is feeling a bit better today but still feels very weak. She has some increased rhinorrhea, but not discolored. Denies any other respiratory symptoms other then mild-moderate shortness of breath which is improving. Has been feeling better since receiving fluids, tells me she hadn't been drinking enough at home because she was feeling so poorly. Hemoglobin today 10 down from 12.8 yesterday. Patient denies bleeding from any source other than some scant blood streaks when she blows her nose.     Past Medical History:  Past Medical History:   Diagnosis Date    Acute hepatitis C without mention of hepatic coma(070.51)     Za genotype 7/06, bx 9/06    AICD (automatic cardioverter/defibrillator) present 06/2013    Anemia     ASHD (arteriosclerotic heart disease)     s/p CABG-complex RCA stent 6/11 R SC stent 6/11; status post angioplasty and drug-eluting stent 2/2015;    S/p PCI/MEHDI SVG-OM2/2016    Atherosclerosis of coronary artery     Autoimmune hepatitis (HCC)     Cardiomyopathy (HCC)     EF 40%    CKD (chronic kidney disease)     2-3    Coronary atherosclerosis of unspecified type of vessel, native or graft     CVD (cerebrovascular disease)     R 50% L <50% R vert > 50%; status post right carotid endarterectomy 4/2016    Depression     Elevated LFTs     hep c 7/06, bx 3/10    Essential hypertension ?    Glucose intolerance (pre-diabetes)     Heart attack (HCC)     Heart disease ?[id number]    bypass/pacemaker/stents/TCO-    High blood pressure     High cholesterol     History of right shoulder fracture 2019    Hyperlipemia     Hyperlipidemia     Hypertension     b/p check R arm    Incontinence     Osteopenia     DEXA 10/2012-lumbar spine T score -1.9, femoral neck T score -1.4; history of right  shoulder fracture 2019    Positive VALARIE (antinuclear antibody)     Primary biliary cirrhosis (HCC)     PVD (peripheral vascular disease) (HCC)     Shortness of breath     Stage 3 chronic kidney disease (HCC)     Subclavian steal syndrome     Thrombocytopenia (HCC)     Type 2 diabetes mellitus with diabetic chronic kidney disease (HCC) 01/07/2016    Type 2 diabetes mellitus with stage 3 chronic kidney disease, without long-term current use of insulin (HCC)     Type II or unspecified type diabetes mellitus without mention of complication, not stated as uncontrolled (aka 74849)     Viral hepatitis C ??    went through treatment    Visual impairment        Past Surgical History:  Past Surgical History:   Procedure Laterality Date    ANGIOGRAM  03/01/1996    done @[facility]; medical therapy    ANGIOGRAM  06/03/1996    done @[facility]; medical therapy    ANGIOGRAM  03/03/1997    ANGIOGRAM  03/28/2007    ANGIOGRAM  04/05/2010    ANGIOGRAM  06/06/2011    ANGIOGRAM  02/11/2013    ANGIOGRAM  02/01/2016    ANGIOPLASTY (CORONARY)  09/25/2017    stent to LAD    ANGIOPLASTY (CORONARY)  04/11/2017    stent to OM    ANGIOPLASTY (CORONARY)  02/05/2016    stent to OM    ANGIOPLASTY (CORONARY)  02/13/2013    stent to RCA    ANGIOPLASTY (CORONARY)  02/27/2013    Left subclavian angioplasty    ANGIOPLASTY (CORONARY)  07/13/2011    Left subclavian angioplasty/stent    ANGIOPLASTY (CORONARY)  06/08/2011    stent to RCA    ARTHROSCOPY OF JOINT UNLISTED      CABG  1996    mar lad/svg to om svg to rca    CARDIAC DEFIBRILLATOR PLACEMENT      [employer] ICD, intermittently dependent    CARDIAC PACEMAKER PLACEMENT      CARPAL TUNNEL RELEASE  ??    CATH DRUG ELUTING STENT      CATH PERCUTANEOUS  TRANSLUMINAL CORONARY ANGIOPLASTY      x2 stents Feb/2016    CHOLECYSTECTOMY      COLONOSCOPY      KNEE ARTHROSCOPY  ?    OPEN HEART SURGERY (PBP)      [id number]    ORTHOPEDIC SURG (PBP)      KNEE AND CARPAL TUNNEL SURGERY     OTHER SURGICAL HISTORY Right 04/2016    right carotid endarterectomy    TONSILLECTOMY         Family Medical History:  Family History   Problem Relation Age of Onset    Heart Disorder Father     Diabetes Father     Heart Disease Father     Heart Disorder Mother     Cancer Mother         breast cancer    Breast Cancer Mother 80        80    Arthritis Mother     Cancer Sister         breast cancer    Breast Cancer Sister 50        50    Heart Disease Son     Heart Disease Brother     Heart Disease Brother        Psychosocial History:  Social History     Socioeconomic History    Marital status:      Spouse name: Not on file    Number of children: Not on file    Years of education: Not on file    Highest education level: Not on file   Occupational History    Not on file   Tobacco Use    Smoking status: Never    Smokeless tobacco: Never   Vaping Use    Vaping Use: Never used   Substance and Sexual Activity    Alcohol use: No    Drug use: Never    Sexual activity: Not on file   Other Topics Concern    Not on file   Social History Narrative    Not on file     Social Determinants of Health     Financial Resource Strain: Not on file   Food Insecurity: No Food Insecurity (3/26/2024)    Food Insecurity     Food Insecurity: Never true   Transportation Needs: No Transportation Needs (3/26/2024)    Transportation Needs     Lack of Transportation: No   Physical Activity: Not on file   Stress: Not on file   Social Connections: Not on file   Housing Stability: Low Risk  (3/26/2024)    Housing Stability     Housing Instability: No     Housing Instability Emergency: Not on file       Allergies:   Allergies   Allergen Reactions    Statins MYALGIA, OTHER (SEE COMMENTS) and UNKNOWN     Weak in the knees    Elevated LFT's,muscle weakness    Colestid Flavored OTHER (SEE COMMENTS)    Valium [Diazepam] OTHER (SEE COMMENTS)     Shaking; insomnia  **all valium derivatives**       Current Medications:   apixaban (Eliquis) tab 5 mg  5 mg  Oral BID    aspirin DR tab 81 mg  81 mg Oral Nightly    FLUoxetine (PROzac) cap 20 mg  20 mg Oral QAM    metoprolol succinate ER (Toprol XL) 24 hr tab 50 mg  50 mg Oral Nightly    [Held by provider] sacubitril-valsartan (Entresto) 24-26 MG per tab 1 tablet  1 tablet Oral BID    acetaminophen (Tylenol Extra Strength) tab 500 mg  500 mg Oral Q4H PRN    melatonin tab 3 mg  3 mg Oral Nightly PRN    ondansetron (Zofran) 4 MG/2ML injection 4 mg  4 mg Intravenous Q6H PRN    metoclopramide (Reglan) 5 mg/mL injection 5 mg  5 mg Intravenous Q8H PRN    glucose (Dex4) 15 GM/59ML oral liquid 15 g  15 g Oral Q15 Min PRN    Or    glucose (Glutose) 40% oral gel 15 g  15 g Oral Q15 Min PRN    Or    glucose-vitamin C (Dex-4) chewable tab 4 tablet  4 tablet Oral Q15 Min PRN    Or    dextrose 50% injection 50 mL  50 mL Intravenous Q15 Min PRN    Or    glucose (Dex4) 15 GM/59ML oral liquid 30 g  30 g Oral Q15 Min PRN    Or    glucose (Glutose) 40% oral gel 30 g  30 g Oral Q15 Min PRN    Or    glucose-vitamin C (Dex-4) chewable tab 8 tablet  8 tablet Oral Q15 Min PRN    insulin aspart (NovoLOG) 100 Units/mL FlexPen 2-10 Units  2-10 Units Subcutaneous TID AC and HS    azithromycin (Zithromax) 500 mg in sodium chloride 0.9% 250mL IVPB premix  500 mg Intravenous Q24H    ampicillin-sulbactam (Unasyn) 3 g in sodium chloride 0.9% 100mL IVPB-ADD  3 g Intravenous q12h    sodium chloride 0.9% infusion   Intravenous Continuous       Home Medications:  No current facility-administered medications on file prior to encounter.     Current Outpatient Medications on File Prior to Encounter   Medication Sig    apixaban 5 MG Oral Tab Take 1 tablet (5 mg total) by mouth 2 (two) times daily.    sacubitril-valsartan (ENTRESTO) 24-26 MG Oral Tab Take 1 tablet by mouth 2 (two) times daily.    metoprolol succinate ER 25 MG Oral Tablet 24 Hr Take 1 tablet (25 mg total) by mouth nightly. Patient takes 50mg / nightly    FLUoxetine HCl 20 MG Oral Cap Take 1 capsule  (20 mg total) by mouth every morning.    Evolocumab 140 MG/ML Subcutaneous Solution Prefilled Syringe Inject 1 mL into the skin every 14 (fourteen) days.    aspirin 81 MG Oral Tab Take 1 tablet (81 mg total) by mouth daily.    LANCETS MICRO THIN 33G Does not apply Misc 1 each by Does not apply route daily. E11.22 (Patient not taking: Reported on 3/27/2023)    Magnesium 100 MG Oral Tab Take 1 tablet (100 mg total) by mouth daily.    Multiple Vitamins-Iron (MULTIVITAMIN/IRON) Oral Tab Take 1 tablet by mouth daily.    Omega-3 Fatty Acids (FISH OIL) 1000 MG Oral Cap Take 1,000 mg by mouth daily.    ascorbic acid (VITAMIN C) 500 MG Oral Tab Take 1 tablet (500 mg total) by mouth daily.    Cholecalciferol (VITAMIN D) 1000 UNITS Oral Cap Take 1,000 Units by mouth daily.         Review of Systems:  A comprehensive 14 point review of systems was completed.  Pertinent positives and negatives noted in the the HPI.    Allergies:  Allergies   Allergen Reactions    Statins MYALGIA, OTHER (SEE COMMENTS) and UNKNOWN     Weak in the knees    Elevated LFT's,muscle weakness    Colestid Flavored OTHER (SEE COMMENTS)    Valium [Diazepam] OTHER (SEE COMMENTS)     Shaking; insomnia  **all valium derivatives**         Vital Signs:  /56 (BP Location: Right arm)   Pulse 72   Temp 98.1 °F (36.7 °C) (Oral)   Resp 18   Ht 1.549 m (5' 1\")   Wt 68 kg (149 lb 14.6 oz)   SpO2 95%   BMI 28.33 kg/m²     Last 3 Weights   03/26/24 1727 68 kg (149 lb 14.6 oz)   03/26/24 1028 67.1 kg (148 lb)   12/11/23 0934 70.3 kg (155 lb)   03/27/23 2125 72.8 kg (160 lb 8 oz)   03/27/23 1839 70.3 kg (155 lb)       Physical Examination:  General: Patient is alert and oriented x 3, not in acute distress.  Vital Signs: /56 (BP Location: Right arm)   Pulse 72   Temp 98.1 °F (36.7 °C) (Oral)   Resp 18   Ht 1.549 m (5' 1\")   Wt 68 kg (149 lb 14.6 oz)   SpO2 95%   BMI 28.33 kg/m²   HEENT: EOMs intact. PERRL. Oropharynx is clear.   Chest: Clear to  auscultation, respirations non-labored   Heart: Regular rate and rhythm.   Abdomen: Soft, non tender, non-distended with present bowel sounds.  Extremities: No lower extremity edema.  Neurological: Grossly intact.   Psych/Depression: mood and affect are appropriate    Labs:  Recent Results (from the past 24 hour(s))   Troponin I (High Sensitivity)    Collection Time: 03/26/24  5:11 PM   Result Value Ref Range    Troponin I (High Sensitivity) 22 <=54 ng/L   POCT Glucose    Collection Time: 03/26/24  6:22 PM   Result Value Ref Range    POC Glucose 108 (H) 70 - 99 mg/dL   Troponin I (High Sensitivity)    Collection Time: 03/26/24  8:05 PM   Result Value Ref Range    Troponin I (High Sensitivity) 22 <=54 ng/L   POCT Glucose    Collection Time: 03/26/24  9:24 PM   Result Value Ref Range    POC Glucose 185 (H) 70 - 99 mg/dL   Clostridium difficile(toxigenic)PCR    Collection Time: 03/26/24 10:05 PM    Specimen: Stool   Result Value Ref Range    C. Difficile Toxin B Gene Negative Negative   POCT Glucose    Collection Time: 03/26/24 11:05 PM   Result Value Ref Range    POC Glucose 131 (H) 70 - 99 mg/dL   POCT Glucose    Collection Time: 03/27/24  5:16 AM   Result Value Ref Range    POC Glucose 98 70 - 99 mg/dL   Comp Metabolic Panel (14)    Collection Time: 03/27/24  5:54 AM   Result Value Ref Range    Glucose 90 70 - 99 mg/dL    Sodium 143 136 - 145 mmol/L    Potassium 4.0 3.5 - 5.1 mmol/L    Chloride 112 98 - 112 mmol/L    CO2 26.0 21.0 - 32.0 mmol/L    Anion Gap 5 0 - 18 mmol/L    BUN 26 (H) 9 - 23 mg/dL    Creatinine 0.84 0.55 - 1.02 mg/dL    Calcium, Total 8.4 (L) 8.5 - 10.1 mg/dL    Calculated Osmolality 300 (H) 275 - 295 mOsm/kg    eGFR-Cr 68 >=60 mL/min/1.73m2    AST 23 15 - 37 U/L    ALT 28 13 - 56 U/L    Alkaline Phosphatase 65 55 - 142 U/L    Bilirubin, Total 0.7 0.1 - 2.0 mg/dL    Total Protein 5.2 (L) 6.4 - 8.2 g/dL    Albumin 2.7 (L) 3.4 - 5.0 g/dL    Globulin  2.5 (L) 2.8 - 4.4 g/dL    A/G Ratio 1.1 1.0 - 2.0    Magnesium    Collection Time: 03/27/24  5:54 AM   Result Value Ref Range    Magnesium 1.3 (L) 1.6 - 2.6 mg/dL   CBC W/ DIFFERENTIAL    Collection Time: 03/27/24  5:54 AM   Result Value Ref Range    WBC 1.6 (L) 4.0 - 11.0 x10(3) uL    RBC 3.25 (L) 3.80 - 5.30 x10(6)uL    HGB 10.0 (L) 12.0 - 16.0 g/dL    HCT 29.2 (L) 35.0 - 48.0 %    PLT 52.0 (L) 150.0 - 450.0 10(3)uL    Immature Platelet Fraction 9.5 (H) 0.0 - 7.0 %    MCV 89.8 80.0 - 100.0 fL    MCH 30.8 26.0 - 34.0 pg    MCHC 34.2 31.0 - 37.0 g/dL    RDW 12.9 %    Neutrophil Absolute Prelim 0.43 (LL) 1.50 - 7.70 x10 (3) uL    Neutrophil Absolute 0.43 (LL) 1.50 - 7.70 x10(3) uL    Lymphocyte Absolute 0.87 (L) 1.00 - 4.00 x10(3) uL    Monocyte Absolute 0.10 0.10 - 1.00 x10(3) uL    Eosinophil Absolute 0.15 0.00 - 0.70 x10(3) uL    Basophil Absolute 0.01 0.00 - 0.20 x10(3) uL    Immature Granulocyte Absolute 0.00 0.00 - 1.00 x10(3) uL    Neutrophil % 27.6 %    Lymphocyte % 55.8 %    Monocyte % 6.4 %    Eosinophil % 9.6 %    Basophil % 0.6 %    Immature Granulocyte % 0.0 %   Scan slide    Collection Time: 03/27/24  5:54 AM   Result Value Ref Range    Slide Review       Slide reviewed, normal WBC, RBC, and platelet morphology.   POCT Glucose    Collection Time: 03/27/24 12:14 PM   Result Value Ref Range    POC Glucose 147 (H) 70 - 99 mg/dL     CBC:    Lab Results   Component Value Date    WBC 1.6 (L) 03/27/2024    WBC 2.1 (L) 03/26/2024    WBC 4.2 12/05/2023     Lab Results   Component Value Date    HEMOGLOBIN 14.5 06/25/2014    HGB 10.0 (L) 03/27/2024    HGB 12.8 03/26/2024    HGB 14.1 12/05/2023      Lab Results   Component Value Date    PLT 52.0 (L) 03/27/2024    PLT 80.0 (L) 03/26/2024    .0 12/05/2023       Radiology:    XR CHEST AP PORTABLE  (CPT=71045)    Result Date: 3/26/2024  CONCLUSION:  1. Left lower lobe retrocardiac atelectasis and or scar, early consolidative process cannot be excluded, correlate clinically.   LOCATION:  [facility]      Dictated by  (CST): Gauri Swanson MD on 3/26/2024 at 11:47 AM     Finalized by (CST): Gauri Swanson MD on 3/26/2024 at 11:49 AM          Impression/Plan    Pancytopenia         - 2/2 chemotherapy received on 3/19/24. She is entering her ayan and decrease in not unexpected         - Anemia likely with dilutional component, continue to trend         -  Neutropenic precautions, will give gcsf in light of possible pneumonia         - Trend CBC daily, transfuse for Hgb <7, platelets <10K or if signs of bleeding.     ?Pneumonia         - seen on xray, currently on unasyn    Stage IA carcinosarcoma of the Uterus         - s/p 3 cycles carbo/taxol with plans to proceed with vaginal brachytherapy in April    Shortness of breath/generalized weakness         - Cardiac work up unremarkable thus far, suspect 2/2 dehydration and chemo therapy +/- (?)pneumonia    pAF         - On Eliquis per cardiology, currently atrial paced    Case discussed with Dr. Quinn, who is in agreement with the plan as outlined above.     Is this a shared or split note between Advanced Practice Provider and Physician? No      Electronically Signed by:      Sakshi Garcia, SHAUNA, AOCNP, AGPCNP-BC  Nurse Practitioner  Hematology and Oncology

## 2024-03-27 NOTE — PLAN OF CARE
Pt is A&O x4.  Reports no pain. Denies cardiac symptoms.  Dyspnea on exertion. Maintaining O2 on RA.  A paced on tele.  Bowel sounds active in all quadrants.   Continent of bowel and bladder.  IVF running at 100 mL/hr.  Pt updated on plan of care.  Bed in lowest position. Bed alarm on. Call light within reach.     Problem: Diabetes/Glucose Control  Goal: Glucose maintained within prescribed range  Description: INTERVENTIONS:  - Monitor Blood Glucose as ordered  - Assess for signs and symptoms of hyperglycemia and hypoglycemia  - Administer ordered medications to maintain glucose within target range  - Assess barriers to adequate nutritional intake and initiate nutrition consult as needed  - Instruct patient on self management of diabetes  Outcome: Progressing     Problem: Patient/Family Goals  Goal: Patient/Family Long Term Goal  Description: Patient's Long Term Goal: stay out of hospital    Interventions:  - follow up appointments  - med compliance   - See additional Care Plan goals for specific interventions  Outcome: Progressing  Goal: Patient/Family Short Term Goal  Description: Patient's Short Term Goal: discharge    Interventions:   - IVF & abx  - Echo  - See additional Care Plan goals for specific interventions  Outcome: Progressing     Problem: CARDIOVASCULAR - ADULT  Goal: Maintains optimal cardiac output and hemodynamic stability  Description: INTERVENTIONS:  - Monitor vital signs, rhythm, and trends  - Monitor for bleeding, hypotension and signs of decreased cardiac output  - Evaluate effectiveness of vasoactive medications to optimize hemodynamic stability  - Monitor arterial and/or venous puncture sites for bleeding and/or hematoma  - Assess quality of pulses, skin color and temperature  - Assess for signs of decreased coronary artery perfusion - ex. Angina  - Evaluate fluid balance, assess for edema, trend weights  Outcome: Progressing     Problem: GASTROINTESTINAL - ADULT  Goal: Minimal or absence of  nausea and vomiting  Description: INTERVENTIONS:  - Maintain adequate hydration with IV or PO as ordered and tolerated  - Nasogastric tube to low intermittent suction as ordered  - Evaluate effectiveness of ordered antiemetic medications  - Provide nonpharmacologic comfort measures as appropriate  - Advance diet as tolerated, if ordered  - Obtain nutritional consult as needed  - Evaluate fluid balance  Outcome: Progressing

## 2024-03-27 NOTE — PLAN OF CARE
Patient is alert and oriented ties four. Lungs clear on auscultation. Patient has no c/o pain. She states that her nausea is gone and she is no longer having loose stools. Her IV of 0.9 NS is infusing at 100 ml/hr. She is a paced on the monitor.

## 2024-03-27 NOTE — PROGRESS NOTES
Progress Note  Sol Dee Patient Status:  Observation    3/11/1940 MRN [id number]   Location [facility] 8NE-A Attending Rodo Baldwin MD   Hosp Day # 0 PCP Artemio Reeder MD     Subjective:  Mrs. Dee feels improved. She denies chest pain, palpitations, or dyspnea today.   Remains weak.    Objective:  Physical Exam:   /56 (BP Location: Right arm)   Pulse 72   Temp 98.1 °F (36.7 °C) (Oral)   Resp 18   Ht 5' 1\" (1.549 m)   Wt 149 lb 14.6 oz (68 kg)   SpO2 95%   BMI 28.33 kg/m²   Temp (24hrs), Av °F (36.7 °C), Min:97.8 °F (36.6 °C), Max:98.1 °F (36.7 °C)       Intake/Output Summary (Last 24 hours) at 3/27/2024 1243  Last data filed at 3/27/2024 0848  Gross per 24 hour   Intake 240 ml   Output --   Net 240 ml     Wt Readings from Last 3 Encounters:   24 149 lb 14.6 oz (68 kg)   23 155 lb (70.3 kg)   23 160 lb 8 oz (72.8 kg)     Telemetry: Atrial paced  General: Alert and oriented in no apparent distress sitting comfortably in bed.  HEENT: No focal deficits.  Neck: No JVD, carotids 2+ no bruits.  Cardiac: Regular rate and rhythm, S1, S2 normal, no murmur, rub or gallop.  Lungs: Clear without wheezes, rales, rhonchi or dullness.  Normal excursions and effort.  Abdomen: Soft, non-tender.   Extremities: Without clubbing, cyanosis or edema.  Peripheral pulses are 2+.  Neurologic: Alert and oriented, normal affect.  Skin: Warm and dry.        Intake/Output:    Intake/Output Summary (Last 24 hours) at 3/27/2024 1203  Last data filed at 3/27/2024 0848  Gross per 24 hour   Intake 240 ml   Output --   Net 240 ml       Last 3 Weights   24 1727 149 lb 14.6 oz (68 kg)   24 1028 148 lb (67.1 kg)   23 0934 155 lb (70.3 kg)   23 2125 160 lb 8 oz (72.8 kg)   23 1839 155 lb (70.3 kg)       Labs:  Recent Labs   Lab 24  1047 24  0554   * 90   BUN 30* 26*   CREATSERUM 1.12* 0.84   EGFRCR 48* 68   CA 9.8 8.4*   * 143   K 4.4  4.0    112   CO2 26.0 26.0     Recent Labs   Lab 03/26/24  1047 03/27/24  0554   RBC 4.14 3.25*   HGB 12.8 10.0*   HCT 35.4 29.2*   MCV 85.5 89.8   MCH 30.9 30.8   MCHC 36.2 34.2   RDW 12.8 12.9   NEPRELIM 0.93* 0.43*   WBC 2.1* 1.6*   PLT 80.0* 52.0*         Recent Labs   Lab 03/26/24  1047 03/26/24  1711 03/26/24 2005   TROPHS 20 22 22       Diagnostics:   ECHOCARDIOGRAM 3/27/2024:  1. Left ventricle: The cavity size was normal. Wall thickness was normal.      Systolic function was normal. The estimated ejection fraction was 55-60%,  by visual assessment. Hypokinesis of the basalinferolateral and inferior walls. Features are consistent with a pseudonormal left ventricular filling pattern, with concomitant abnormal relaxation and increased filling pressure - grade 2 diastolic dysfunction. LVEDP at least 25.   2. Right ventricle: Pacer C/W ICD noted in the right ventricle.   3. Right atrium: Pacer C/W ICD noted in right atrium.   4. Mitral valve: There was mild to moderate regurgitation, directed      centrally and posteriorly.   5. Tricuspid valve: There was moderate regurgitation.   6. Pulmonary arteries: Systolic pressure was markedly increased, in the      range of 60mm Hg to 65mm Hg. Estimated pulmonary artery diastolic      pressure was 13mm Hg.   7. Pericardium, extracardiac: There was a right pleural effusion.   Impressions:  This study is compared with previous dated 2/13/23: Compared to the previous study, these findings represent improvement.     [facility] 12/13/2023:  1.     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  2.     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  3.     A 100% LAD.  4.     Patent stent to the circumflex.  5.     Patent subclavian stent with patent LIMA to the LAD.  6.     Occluded right coronary artery with collaterals from the circumflex and the LAD.  Will discuss  PCI in the future.     [facility] Cardiac PET 11/14/2023              This is an abnormal perfusion  study.               Small fixed inferobasal defect of moderate severity. Small fixed lateral wall perfusion defect of mild severity. Small reversible inferoseptal perfusion defect of mild severity.              The left ventricular cavity is noted to be normal on the stress studies. The stress left ventricular ejection fraction was calculated to be 42% and left ventricular global function is mildly reduced. The rest left ventricular cavity is noted to be normal. The rest left ventricular ejection fraction was calculated to be 43% and rest left ventricular global function is mildly reduced. There was inferobasal, septal and lateral wall hypokinesia.          [facility] Echo 6/26/2023  1.          The study quality is average.   2.          The left ventricle is normal in size.  Mild LV hypertrophy  Global left ventricular systolic function is mildly decreased. The left ventricular ejection fraction is 45-50%. The left ventricle diastolic function is impaired (Grade I) with normal left atrial pressure.  There  is mild  hypokinesis of basal and mid inferior wall region and mid lateral segment.  3.          The left atrial diameter is mildly increased. Left atrial diameter is 4.2 cm.   4.          There is aortic sclerosis.. Mild calcification of the aortic valve is noted with adequate cuspal excursion.   5.          Mild calcification of the mitral valve is noted. Mild mitral regurgitation.  6.           Mild (1+) tricuspid regurgitation.  7.          The pulmonary artery systolic pressure is 30 mmHg, normal.   8.          As compared to prior study - 02/2023 - LV systolic function improved LVEF increased from 25-30% to 45-50%, moderate MR decreased to mild degree, moderate TR decreased to mild degree, atria sizes decreased, pulmonary HTN decreased from 53mmHg to 30mmHg and LV filling pressures decreased.        [facility] Cardiac PET 9/2022              This is an abnormal perfusion study.               Small, fixed inferobasal  perfusion defect of mild intensity (RCA distribution).  Small fixed lateral wall perfusion defect of mild intensity (LCX).              There is paradoxical septal motion with inferobasal and lateral wall hypokinesia. The resting LVEF is 49%. The LVEF at stress is 51%.  Medications:   apixaban  5 mg Oral BID    aspirin  81 mg Oral Nightly    FLUoxetine  20 mg Oral QAM    metoprolol succinate ER  50 mg Oral Nightly    [Held by provider] sacubitril-valsartan  1 tablet Oral BID    insulin aspart  2-10 Units Subcutaneous TID AC and HS    azithromycin  500 mg Intravenous Q24H    ampicillin-sulbactam  3 g Intravenous q12h      sodium chloride 100 mL/hr at 03/27/24 1018       Assessment/Plan:    Weakness, dyspnea, nausea/vomiting, and diarrhea  Recently completed chemotherapy  Less likely to be cardiac- supported by improved LVEF on echo.  CAD s/p CABG and PCI's  LHC in Dec 2023 with plan for  April  Chest pain free. Troponin negative. ECG without dynamic changes  Chronic HFimpEF LVEF 50-55% this admission prior 40-45% improved from initial of 35%  Compensated today  AICD in place  GDMT: On Entresto and metoprolol outpatient- reintroduce once tolerated by BP  Endometrial cancer  Undergoing chemotherapy  HTN  BP at goal  ROSETTA on CKD III  Cr WNL today  PAF  Currently atrial paced  On Eliquis 5 mg BID  Pancytopenia  Chemotherapy related  Monitor - noted thrombocytopenia 52k today    PLAN  Continue GDMT  Continue Eliquis 5 mg BID without bleeding, but if thrombocytopenia worsens (<50) this may need to be held, hematology/oncology is following  Follow CBCd and CMP tomorrow   We will sign off from a cardiology perspective. Please let us know if there is anything further we can assist with.    Sixto Nettles PA-C  3/27/2024  12:03 PM   MDMper me. HFpEF with prior EF 35% nw normal onGDMT. Hx CABG with  for PCI in April. Overall doing well. Home as she gets stronger.

## 2024-03-28 PROBLEM — D61.818 PANCYTOPENIA (HCC): Status: ACTIVE | Noted: 2024-03-28

## 2024-03-28 PROBLEM — C55 UTERINE CARCINOSARCOMA (HCC): Status: ACTIVE | Noted: 2024-03-28

## 2024-03-28 LAB
ANION GAP SERPL CALC-SCNC: 5 MMOL/L (ref 0–18)
BASOPHILS # BLD AUTO: 0.01 X10(3) UL (ref 0–0.2)
BASOPHILS NFR BLD AUTO: 0.7 %
BUN BLD-MCNC: 19 MG/DL (ref 9–23)
CALCIUM BLD-MCNC: 8.3 MG/DL (ref 8.5–10.1)
CHLORIDE SERPL-SCNC: 115 MMOL/L (ref 98–112)
CO2 SERPL-SCNC: 23 MMOL/L (ref 21–32)
CREAT BLD-MCNC: 0.8 MG/DL
EGFRCR SERPLBLD CKD-EPI 2021: 73 ML/MIN/1.73M2 (ref 60–?)
EOSINOPHIL # BLD AUTO: 0.18 X10(3) UL (ref 0–0.7)
EOSINOPHIL NFR BLD AUTO: 12.7 %
ERYTHROCYTE [DISTWIDTH] IN BLOOD BY AUTOMATED COUNT: 13.1 %
GLUCOSE BLD-MCNC: 111 MG/DL (ref 70–99)
GLUCOSE BLD-MCNC: 122 MG/DL (ref 70–99)
GLUCOSE BLD-MCNC: 137 MG/DL (ref 70–99)
GLUCOSE BLD-MCNC: 155 MG/DL (ref 70–99)
GLUCOSE BLD-MCNC: 162 MG/DL (ref 70–99)
GLUCOSE BLD-MCNC: 98 MG/DL (ref 70–99)
HCT VFR BLD AUTO: 27.2 %
HGB BLD-MCNC: 9 G/DL
IMM GRANULOCYTES # BLD AUTO: 0 X10(3) UL (ref 0–1)
IMM GRANULOCYTES NFR BLD: 0 %
LYMPHOCYTES # BLD AUTO: 0.61 X10(3) UL (ref 1–4)
LYMPHOCYTES NFR BLD AUTO: 43 %
MAGNESIUM SERPL-MCNC: 2 MG/DL (ref 1.6–2.6)
MCH RBC QN AUTO: 30.6 PG (ref 26–34)
MCHC RBC AUTO-ENTMCNC: 33.1 G/DL (ref 31–37)
MCV RBC AUTO: 92.5 FL
MONOCYTES # BLD AUTO: 0.15 X10(3) UL (ref 0.1–1)
MONOCYTES NFR BLD AUTO: 10.6 %
NEUTROPHILS # BLD AUTO: 0.47 X10 (3) UL (ref 1.5–7.7)
NEUTROPHILS # BLD AUTO: 0.47 X10(3) UL (ref 1.5–7.7)
NEUTROPHILS NFR BLD AUTO: 33 %
OSMOLALITY SERPL CALC.SUM OF ELEC: 299 MOSM/KG (ref 275–295)
PHOSPHATE SERPL-MCNC: 2.7 MG/DL (ref 2.5–4.9)
PLATELET # BLD AUTO: 50 10(3)UL (ref 150–450)
POTASSIUM SERPL-SCNC: 3.7 MMOL/L (ref 3.5–5.1)
RBC # BLD AUTO: 2.94 X10(6)UL
SODIUM SERPL-SCNC: 143 MMOL/L (ref 136–145)
WBC # BLD AUTO: 1.4 X10(3) UL (ref 4–11)

## 2024-03-28 PROCEDURE — 99233 SBSQ HOSP IP/OBS HIGH 50: CPT | Performed by: HOSPITALIST

## 2024-03-28 PROCEDURE — 99233 SBSQ HOSP IP/OBS HIGH 50: CPT | Performed by: SPECIALIST

## 2024-03-28 RX ORDER — BISACODYL 10 MG
10 SUPPOSITORY, RECTAL RECTAL
Status: DISCONTINUED | OUTPATIENT
Start: 2024-03-28 | End: 2024-03-28

## 2024-03-28 RX ORDER — POTASSIUM CHLORIDE 20 MEQ/1
40 TABLET, EXTENDED RELEASE ORAL ONCE
Status: COMPLETED | OUTPATIENT
Start: 2024-03-28 | End: 2024-03-28

## 2024-03-28 RX ORDER — AZITHROMYCIN 250 MG/1
500 TABLET, FILM COATED ORAL
Status: COMPLETED | OUTPATIENT
Start: 2024-03-28 | End: 2024-03-28

## 2024-03-28 RX ORDER — DOCUSATE SODIUM 100 MG/1
100 CAPSULE, LIQUID FILLED ORAL 2 TIMES DAILY
Status: DISCONTINUED | OUTPATIENT
Start: 2024-03-28 | End: 2024-03-28

## 2024-03-28 RX ORDER — ENEMA 19; 7 G/133ML; G/133ML
1 ENEMA RECTAL ONCE AS NEEDED
Status: DISCONTINUED | OUTPATIENT
Start: 2024-03-28 | End: 2024-03-28

## 2024-03-28 RX ORDER — POLYETHYLENE GLYCOL 3350 17 G/17G
17 POWDER, FOR SOLUTION ORAL DAILY PRN
Status: DISCONTINUED | OUTPATIENT
Start: 2024-03-28 | End: 2024-03-28

## 2024-03-28 NOTE — CM/SW NOTE
03/28/24 1132   Choice of Post-Acute Provider   Informed patient of right to choose their preferred provider Yes   List of appropriate post-acute services provided to patient/family with quality data Yes   Information given to Patient;Daughter       SW met with pt and additional family members to discuss discharge planning. Pt was working with PT at the time, discussed HHC at discharge. Pt and family agreeable. HH list given for choice. SW to follow back later on. SW also provided [facility] caregiver support information. They expressed appreciation. SW to follow back.      &  to remain available and supportive for discharge planning needs.    Shaneka Souza, MSW, LSW  Discharge Planner  [phone]

## 2024-03-28 NOTE — PROGRESS NOTES
[facility] Progress Note      Patient Name: Sol Dee   YOB: 1940  Medical Record Number: [id number]  Attending Physician: Jason Quinn M.D.     The 21st Century Cures Act makes medical notes like these available to patients in the interest of transparency. Please be advised this is a medical document. Medical documents are intended to carry relevant information, facts as evident, and the clinical opinion of the practitioner. The medical note is intended as peer to peer communication and may appear blunt or direct. It is written in medical language and may contain abbreviations or verbiage that are unfamiliar.      SUBJECTIVE:  Sol Dee is a(n) 84 year old female undergoing adjuvant chemotherapy with carboplatin and paclitaxel at [facility] under the care of Dr. Ortzi of [facility]. She presented with nausea and vomiting.    No further nausea or vomiting. No diarrhea.     OBJECTIVE:  Vitals  Blood pressure 98/58, pulse 73, temperature 98.4 °F (36.9 °C), temperature source Oral, resp. rate 18, height 1.549 m (5' 1\"), weight 68 kg (149 lb 14.6 oz), SpO2 99%.    Physical Examination  Constitutional      NAD.  Head   Normocephalic and atraumatic.  Eyes   Conjunctiva clear; sclera anicteric.  ENMT                 External nose normal; external ears normal.  Neck                   No JVD.  Respiratory          Normal effort; no respiratory distress.  Cardiovascular     Regular rate and rhythm.  Abdomen            Not distended.   Neurologic           Motor and sensory grossly intact.  Psychiatric          Mood and affect appropriate.    Labs  Recent Results (from the past 24 hour(s))   POCT Glucose    Collection Time: 03/27/24 12:14 PM   Result Value Ref Range    POC Glucose 147 (H) 70 - 99 mg/dL   POCT Glucose    Collection Time: 03/27/24  4:37 PM   Result Value Ref Range    POC Glucose 144 (H) 70 - 99 mg/dL   POCT Glucose    Collection Time: 03/27/24  8:54  PM   Result Value Ref Range    POC Glucose 179 (H) 70 - 99 mg/dL   POCT Glucose    Collection Time: 03/28/24  4:53 AM   Result Value Ref Range    POC Glucose 98 70 - 99 mg/dL   Basic Metabolic Panel (8)    Collection Time: 03/28/24  5:50 AM   Result Value Ref Range    Glucose 111 (H) 70 - 99 mg/dL    Sodium 143 136 - 145 mmol/L    Potassium 3.7 3.5 - 5.1 mmol/L    Chloride 115 (H) 98 - 112 mmol/L    CO2 23.0 21.0 - 32.0 mmol/L    Anion Gap 5 0 - 18 mmol/L    BUN 19 9 - 23 mg/dL    Creatinine 0.80 0.55 - 1.02 mg/dL    Calcium, Total 8.3 (L) 8.5 - 10.1 mg/dL    Calculated Osmolality 299 (H) 275 - 295 mOsm/kg    eGFR-Cr 73 >=60 mL/min/1.73m2   Phosphorus    Collection Time: 03/28/24  5:50 AM   Result Value Ref Range    Phosphorus 2.7 2.5 - 4.9 mg/dL   Magnesium    Collection Time: 03/28/24  5:50 AM   Result Value Ref Range    Magnesium 2.0 1.6 - 2.6 mg/dL   CBC W/ DIFFERENTIAL    Collection Time: 03/28/24  5:50 AM   Result Value Ref Range    WBC 1.4 (L) 4.0 - 11.0 x10(3) uL    RBC 2.94 (L) 3.80 - 5.30 x10(6)uL    HGB 9.0 (L) 12.0 - 16.0 g/dL    HCT 27.2 (L) 35.0 - 48.0 %    PLT 50.0 (L) 150.0 - 450.0 10(3)uL    MCV 92.5 80.0 - 100.0 fL    MCH 30.6 26.0 - 34.0 pg    MCHC 33.1 31.0 - 37.0 g/dL    RDW 13.1 %    Neutrophil Absolute Prelim 0.47 (LL) 1.50 - 7.70 x10 (3) uL    Neutrophil Absolute 0.47 (LL) 1.50 - 7.70 x10(3) uL    Lymphocyte Absolute 0.61 (L) 1.00 - 4.00 x10(3) uL    Monocyte Absolute 0.15 0.10 - 1.00 x10(3) uL    Eosinophil Absolute 0.18 0.00 - 0.70 x10(3) uL    Basophil Absolute 0.01 0.00 - 0.20 x10(3) uL    Immature Granulocyte Absolute 0.00 0.00 - 1.00 x10(3) uL    Neutrophil % 33.0 %    Lymphocyte % 43.0 %    Monocyte % 10.6 %    Eosinophil % 12.7 %    Basophil % 0.7 %    Immature Granulocyte % 0.0 %   Scan slide    Collection Time: 03/28/24  5:50 AM   Result Value Ref Range    Slide Review       Slide reviewed, normal WBC, RBC, and platelet morphology.       Medications   azithromycin (Zithromax) tab  500 mg  500 mg Oral Daily    potassium chloride (K-Dur) tab 40 mEq  40 mEq Oral Once    filgrastim-aafi (Nivestym) 480 MCG/0.8ML injection 480 mcg  480 mcg Subcutaneous Once    [COMPLETED] magnesium sulfate 4 g/100mL IVPB premix 4 g  4 g Intravenous Once    ampicillin-sulbactam (Unasyn) 3 g in sodium chloride 0.9% 100mL IVPB-ADD  3 g Intravenous Q6H    apixaban (Eliquis) tab 5 mg  5 mg Oral BID    aspirin DR tab 81 mg  81 mg Oral Nightly    FLUoxetine (PROzac) cap 20 mg  20 mg Oral QAM    metoprolol succinate ER (Toprol XL) 24 hr tab 50 mg  50 mg Oral Nightly    [Held by provider] sacubitril-valsartan (Entresto) 24-26 MG per tab 1 tablet  1 tablet Oral BID    acetaminophen (Tylenol Extra Strength) tab 500 mg  500 mg Oral Q4H PRN    melatonin tab 3 mg  3 mg Oral Nightly PRN    ondansetron (Zofran) 4 MG/2ML injection 4 mg  4 mg Intravenous Q6H PRN    metoclopramide (Reglan) 5 mg/mL injection 5 mg  5 mg Intravenous Q8H PRN    glucose (Dex4) 15 GM/59ML oral liquid 15 g  15 g Oral Q15 Min PRN    Or    glucose (Glutose) 40% oral gel 15 g  15 g Oral Q15 Min PRN    Or    glucose-vitamin C (Dex-4) chewable tab 4 tablet  4 tablet Oral Q15 Min PRN    Or    dextrose 50% injection 50 mL  50 mL Intravenous Q15 Min PRN    Or    glucose (Dex4) 15 GM/59ML oral liquid 30 g  30 g Oral Q15 Min PRN    Or    glucose (Glutose) 40% oral gel 30 g  30 g Oral Q15 Min PRN    Or    glucose-vitamin C (Dex-4) chewable tab 8 tablet  8 tablet Oral Q15 Min PRN    insulin aspart (NovoLOG) 100 Units/mL FlexPen 2-10 Units  2-10 Units Subcutaneous TID AC and HS    sodium chloride 0.9% infusion   Intravenous Continuous       Assessment and Plan  1.   Pancytopenia: Due to chemotherapy and expected. Ordered GCSF yesterday to be given for neutropenia as there is a concern for infection. However, RN failed to administer the medication. It is ordered again today. No transfusion required. Patient is on DOAC and aspiring for cardiac purposes so cardiology to  decide whether these medications should be held in the setting of platelet count of 50K. No transfusion needed.     2.   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     3.   Possible pneumonia: Antibiotic therapy as per primary service.     Patient without documented fever so can be discharged even if neutropenic (should get GCSF prior to discharge if she is to go home today).    Electronically signed by:    Jason Quinn M.D.  System Medical Director of Oncology Services  [facility]

## 2024-03-28 NOTE — CM/SW NOTE
SW met with pt and family. They are agreeable with [facility]. Reserved in [place].     Shaneka Souza, MSW, LSW  Discharge Planner  [phone]

## 2024-03-28 NOTE — DISCHARGE INSTRUCTIONS
Sometimes managing your health at home requires assistance.  The [facility] team has recognized your preference to use [facility].  They can be reached by phone at [phone].  The fax number for your reference is [phone].  A representative from the home health agency will contact you or your family to schedule your first visit.       If you would like to establish care with an [facility] physician, please call our Physician Referral line at [phone], Monday through Friday from 7 a.m. to 6 p.m. and Saturdays from 7 a.m. to 1 p.m.

## 2024-03-28 NOTE — PROGRESS NOTES
[facility]   part of [facility]     Hospitalist Progress Note     Sol Dee Patient Status:  Observation    3/11/1940 MRN [id number]   Former[facility] 8NE-A Attending Rodo Baldwin MD   Hosp Day # 1 PCP Artemio Reeder MD     Chief Complaint: weakness    Subjective:     Patient no sob. Still very weak    Objective:    Review of Systems:   A comprehensive review of systems was completed; pertinent positive and negatives stated in subjective.    Vital signs:  Temp:  [97.5 °F (36.4 °C)-98.4 °F (36.9 °C)] 98.4 °F (36.9 °C)  Pulse:  [71-86] 71  Resp:  [17-19] 18  BP: ()/(46-70) 113/55  SpO2:  [95 %-99 %] 98 %    Physical Exam:    General: No acute distress  Respiratory: No wheezes, no rhonchi  Cardiovascular: S1, S2, regular rate and rhythm  Abdomen: Soft, Non-tender, non-distended, positive bowel sounds  Neuro: No new focal deficits.   Extremities: No edema      Diagnostic Data:    Labs:  Recent Labs   Lab 24  1047 24  0554 24  0550   WBC 2.1* 1.6* 1.4*   HGB 12.8 10.0* 9.0*   MCV 85.5 89.8 92.5   PLT 80.0* 52.0* 50.0*       Recent Labs   Lab 24  1047 24  0554 24  0550   * 90 111*   BUN 30* 26* 19   CREATSERUM 1.12* 0.84 0.80   CA 9.8 8.4* 8.3*   ALB 3.4 2.7*  --    * 143 143   K 4.4 4.0 3.7    112 115*   CO2 26.0 26.0 23.0   ALKPHO 78 65  --    AST 27 23  --    ALT 36 28  --    BILT 1.0 0.7  --    TP 6.6 5.2*  --        Estimated Creatinine Clearance: 39.5 mL/min (based on SCr of 0.8 mg/dL).    Recent Labs   Lab 24  1047 24  1711 24   TROPHS 20 22 22       No results for input(s): \"PTP\", \"INR\" in the last 168 hours.               Microbiology    Hospital Encounter on 24   1. Blood Culture     Status: None (Preliminary result)    Collection Time: 24  5:15 PM    Specimen: Blood,peripheral   Result Value Ref Range    Blood Culture Result No Growth 1 Day N/A         Imaging: Reviewed in  Epic.    Medications:    azithromycin  500 mg Oral Daily    ampicillin-sulbactam  3 g Intravenous Q6H    apixaban  5 mg Oral BID    aspirin  81 mg Oral Nightly    FLUoxetine  20 mg Oral QAM    metoprolol succinate ER  50 mg Oral Nightly    [Held by provider] sacubitril-valsartan  1 tablet Oral BID    insulin aspart  2-10 Units Subcutaneous TID AC and HS       Assessment & Plan:      # Shortness of breath, generalized weakness  -Suspect secondary to chemo which just completed last week with dehydration vs PNA as below  -Cards on consult, does not feel this is cardiac  -Echo  -IV fluids. Cut down to 83 ml/hr  -PT/OT     #dehydration  #diarrhea  #orthostatic hypotension  -cdiff neg, check stool PCR  -IVF  -orthostatics q shift  -diarrhea improving     #Left lower lobe atelectasis versus pneumonia  -neg  procal  -empiric abx. Will complete short course since neutropenic  -bcxs negative ngtd  -repeat 2V CXR same. Reviewed independently, looks like PNA to me  -IVF    #neutropenia  #thrombocytopenia  -counts dropping 2/2 chemo  -consult heme/onc  -if plts <50, hold AC. Plts right at 50. Heme to defer to cards whether to hold ASA/eliquis  -transfuse <10  -GCSF today     #CAD status post CABG  -Known  of RCA and occluded SVG  -eliquis     #History of hepatitis C  #Autoimmune hepatitis  #CKD 3  #Hypertension  #Hyperlipidemia  #DM2  -Insulin sliding scale  #Primary biliary cirrhosis  #Endometrial cancer    No DC today, once counts ayan.    Rodo Baldwin MD    Supplementary Documentation:     Quality:  DVT Mechanical Prophylaxis:   SCDs,    DVT Pharmacologic Prophylaxis   Medication    apixaban (Eliquis) tab 5 mg         DVT Pharmacologic prophylaxis: Aspirin 81 mg      Code Status: Prior  Lopez: No urinary catheter in place  Lopez Duration (in days):   Central line:    ROSELIA: 3/29/2024    Discharge is dependent on: course  At this point Ms. Dee is expected to be discharge to: home    The 21st Century Cures Act makes  medical notes like these available to patients in the interest of transparency. Please be advised this is a medical document. Medical documents are intended to carry relevant information, facts as evident, and the clinical opinion of the practitioner. The medical note is intended as peer to peer communication and may appear blunt or direct. It is written in medical language and may contain abbreviations or verbiage that are unfamiliar.

## 2024-03-28 NOTE — PLAN OF CARE
Assumed care of pt @2330. Pt axox4. Denies chest pain  or sob at rest. Feels weak. A paced on tele. Lungs clear. Abdomen soft, bs +4. Pt denies nausea, has not had any diarhhea today 3/27. Voiding without difficulty. Up x1 and walker.     Plan  -continue to monitor pt on tele  -orthostatic b/p check Q shift  -0.9ns at 100cc/hr  -need stool sample if has another BM  -fall precautions  -IV antibiotics  -updated pt on poc for the night and am, instructed pt to call for any pain or needs. Pt verbalized understanding. Denies needs at this time. Call light within reach. Will continue to monitor pt.   Problem: CARDIOVASCULAR - ADULT  Goal: Maintains optimal cardiac output and hemodynamic stability  Description: INTERVENTIONS:  - Monitor vital signs, rhythm, and trends  - Monitor for bleeding, hypotension and signs of decreased cardiac output  - Evaluate effectiveness of vasoactive medications to optimize hemodynamic stability  - Monitor arterial and/or venous puncture sites for bleeding and/or hematoma  - Assess quality of pulses, skin color and temperature  - Assess for signs of decreased coronary artery perfusion - ex. Angina  - Evaluate fluid balance, assess for edema, trend weights  Outcome: Progressing     Problem: GASTROINTESTINAL - ADULT  Goal: Minimal or absence of nausea and vomiting  Description: INTERVENTIONS:  - Maintain adequate hydration with IV or PO as ordered and tolerated  - Nasogastric tube to low intermittent suction as ordered  - Evaluate effectiveness of ordered antiemetic medications  - Provide nonpharmacologic comfort measures as appropriate  - Advance diet as tolerated, if ordered  - Obtain nutritional consult as needed  - Evaluate fluid balance  Outcome: Progressing

## 2024-03-28 NOTE — PROGRESS NOTES
03/28/24 1816 03/28/24 1818 03/28/24 1820   Vital Signs   /49 128/90 133/47  (pt slightly trembly in the knees.)   MAP (mmHg) 72 100 71   BP Location Right arm Right arm Right arm   BP Method Automatic Automatic Automatic   Patient Position Lying Sitting Standing

## 2024-03-28 NOTE — PLAN OF CARE
Problem: Diabetes/Glucose Control  Goal: Glucose maintained within prescribed range  Description: INTERVENTIONS:  - Monitor Blood Glucose as ordered  - Assess for signs and symptoms of hyperglycemia and hypoglycemia  - Administer ordered medications to maintain glucose within target range  - Assess barriers to adequate nutritional intake and initiate nutrition consult as needed  - Instruct patient on self management of diabetes  Outcome: Progressing     Problem: Patient/Family Goals  Goal: Patient/Family Long Term Goal  Description: Patient's Long Term Goal: stay out of hospital    Interventions:  - follow up appointments  - med compliance   - See additional Care Plan goals for specific interventions  Outcome: Progressing  Goal: Patient/Family Short Term Goal  Description: Patient's Short Term Goal: discharge    Interventions:   - IVF & abx  - Echo  - See additional Care Plan goals for specific interventions  Outcome: Progressing     Problem: CARDIOVASCULAR - ADULT  Goal: Maintains optimal cardiac output and hemodynamic stability  Description: INTERVENTIONS:  - Monitor vital signs, rhythm, and trends  - Monitor for bleeding, hypotension and signs of decreased cardiac output  - Evaluate effectiveness of vasoactive medications to optimize hemodynamic stability  - Monitor arterial and/or venous puncture sites for bleeding and/or hematoma  - Assess quality of pulses, skin color and temperature  - Assess for signs of decreased coronary artery perfusion - ex. Angina  - Evaluate fluid balance, assess for edema, trend weights  Outcome: Progressing     Problem: GASTROINTESTINAL - ADULT  Goal: Minimal or absence of nausea and vomiting  Description: INTERVENTIONS:  - Maintain adequate hydration with IV or PO as ordered and tolerated  - Nasogastric tube to low intermittent suction as ordered  - Evaluate effectiveness of ordered antiemetic medications  - Provide nonpharmacologic comfort measures as appropriate  - Advance diet  as tolerated, if ordered  - Obtain nutritional consult as needed  - Evaluate fluid balance  Outcome: Progressing   Patient alert and oriented x 4. On room air. Apaced on cardiac monitor. Patient denies any chest pain or chest discomfort at this time. Patient voids, last BM 3/27. C- Diff negative, no Bm in this shift, multiple ivabt and ivf infusing ,family here early, no acute distress noted. Plan of care updated, all questions answered. Safety precautions in place. Bed alarm on. Will continue to monitor tele/labs/vital signs closely.     Plan:

## 2024-03-28 NOTE — PHYSICAL THERAPY NOTE
PHYSICAL THERAPY TREATMENT NOTE - INPATIENT    Room Number: 8619/8619-A     Session: 1     Number of Visits to Meet Established Goals: 3    Presenting Problem: dyspnea, dehydration  Co-Morbidities : Endometrial CA s/p chemo, a. fib, CHF, HTN, CKD, CAD s/p CABG, ICD, cirrhosis, hepatits C, DM (s/p total hysterectomy, salpingooectomy)    PHYSICAL THERAPY MEDICAL/SOCIAL HISTORY  History related to current admission: Patient is a 84 year old female admitted on 3/26/2024 from home for dyspnea.  Pt diagnosed with dehydration, possible pneumonia, and orthostatic hypotension.        HOME SITUATION  Type of Home: House   Home Layout: Two level     Lives With: Alone  Drives: Yes     Prior Level of [place]: Pt lives alone in 2 story home. Pt independent with ADL and mobility. Pt does not use RW or cane at baseline. Pt reports supportive children that live nearby,        ASSESSMENT   Patient demonstrates good  progress this session, goals  remain in progress.    Patient continues to function near baseline with bed mobility, transfers, and gait.  Contributing factors to remaining limitations include decreased endurance/aerobic capacity and medical status.  Next session anticipate patient to progress gait.  Physical Therapy will continue to follow patient for duration of hospitalization.    Patient continues to benefit from continued skilled PT services: at discharge to promote functional independence and safety with additional support and return home with home health PT.    PLAN  PT Treatment Plan: Bed mobility;Endurance;Energy conservation;Patient education;Gait training;Strengthening;Balance training;Transfer training;Stair training  Rehab Potential : Good  Frequency (Obs): 3-5x/week    CURRENT GOALS     Goal #1 Patient is able to demonstrate supine - sit EOB @ level: supervision- MET      Goal #2 Patient is able to demonstrate [place] [facility] [place]: supervision      Goal #3 Patient is able to  ambulate 150 feet with assist device: LRAD at assistance level: supervision      Goal #4 Pt will ascend/descend 1 flight of stairs with supervision   Goal #5     Goal #6     Goal Comments: Goals established on 3/27/2024       3/28/2024 all goals ongoing     SUBJECTIVE  \"This is the most I have done in days... wow I'm tired\"    OBJECTIVE  Precautions: Bed/chair alarm    WEIGHT BEARING RESTRICTION  Weight Bearing Restriction: None                PAIN ASSESSMENT   Ratin          BALANCE                                                                                                                       Static Sitting: Good  Dynamic Sitting: Good           Static Standing: Fair -  Dynamic Standing: Poor +    ACTIVITY TOLERANCE                         O2 WALK         AM-PAC '6-Clicks' INPATIENT SHORT FORM - BASIC MOBILITY  How much difficulty does the patient currently have...  Patient Difficulty: Turning over in bed (including adjusting bedclothes, sheets and blankets)?: None   Patient Difficulty: Sitting down on and standing up from a chair with arms (e.g., wheelchair, bedside commode, etc.): A Little   Patient Difficulty: Moving from lying on back to sitting on the side of the bed?: None   How much help from another person does the patient currently need...   Help from Another: Moving to and from a bed to a chair (including a wheelchair)?: A Little   Help from Another: Need to walk in hospital room?: A Little   Help from Another: Climbing 3-5 steps with a railing?: A Little       AM-PAC Score:  Raw Score: 20   Approx Degree of Impairment: 35.83%   Standardized Score (AM-PAC Scale): 47.67   CMS Modifier (G-Code): CJ    FUNCTIONAL ABILITY STATUS  Gait Assessment   Functional Mobility/Gait Assessment  Gait Assistance: Supervision  Distance (ft): 50 - 80  Assistive Device: Rolling walker  Pattern: Shuffle (cues for upright posture - staying in close proximity to RW)  Stairs:  (discussed maintaining on first level  until improved endurance)    Skilled Therapy Provided    Bed Mobility:  Rolling: NT   Supine<>Sit: NT   Sit<>Supine: NT     Transfer Mobility:  Sit<>Stand: SBA   Stand<>Sit: SBA   Gait: SBA    Therapist's Comments: Pt's daughter and YAMILETH present to assist - encouraging and able to stay in town for a while to assist with transition to home with social support      THERAPEUTIC EXERCISES  Lower Extremity Ankle pumps  Glut sets     Upper Extremity none     Position Sitting     Repetitions   10   Sets   1     Patient End of Session: Up in chair;Needs met;Call light within reach;RN aware of session/findings;All patient questions and concerns addressed;Family present    PT Session Time: 30 minutes  Gait Training: 15 minutes  Therapeutic Activity: 8 minutes  Therapeutic Exercise: 0 minutes   Neuromuscular Re-education: 0 minutes

## 2024-03-28 NOTE — PLAN OF CARE
Noted platelets of 50 today. Reviewed with Dr. Longoria, will plan to reduce eliquis to 2.5mg po bid.     Violet Love, APRN  3/28/2024  2:39 PM  [phone]  [facility] [phone]

## 2024-03-28 NOTE — PLAN OF CARE
Assumed pt care at 0730. A&O x 4. On room air. Denies pain. Ortho positive, a paced on tele. Up with x1 assist and walker.     Plan of care: IVF, orthos q shift, IV abx.    Plan of care updated with patient and family. Questions answered, pt verbalized understanding. Call light is within reach. All needs met at this time.    Problem: Diabetes/Glucose Control  Goal: Glucose maintained within prescribed range  Description: INTERVENTIONS:  - Monitor Blood Glucose as ordered  - Assess for signs and symptoms of hyperglycemia and hypoglycemia  - Administer ordered medications to maintain glucose within target range  - Assess barriers to adequate nutritional intake and initiate nutrition consult as needed  - Instruct patient on self management of diabetes  Outcome: Progressing     Problem: Patient/Family Goals  Goal: Patient/Family Long Term Goal  Description: Patient's Long Term Goal: stay out of hospital    Interventions:  - follow up appointments  - med compliance   - See additional Care Plan goals for specific interventions  Outcome: Progressing  Goal: Patient/Family Short Term Goal  Description: Patient's Short Term Goal: discharge    Interventions:   - IVF & abx  - Echo  - See additional Care Plan goals for specific interventions  Outcome: Progressing     Problem: CARDIOVASCULAR - ADULT  Goal: Maintains optimal cardiac output and hemodynamic stability  Description: INTERVENTIONS:  - Monitor vital signs, rhythm, and trends  - Monitor for bleeding, hypotension and signs of decreased cardiac output  - Evaluate effectiveness of vasoactive medications to optimize hemodynamic stability  - Monitor arterial and/or venous puncture sites for bleeding and/or hematoma  - Assess quality of pulses, skin color and temperature  - Assess for signs of decreased coronary artery perfusion - ex. Angina  - Evaluate fluid balance, assess for edema, trend weights  Outcome: Progressing     Problem: GASTROINTESTINAL - ADULT  Goal: Minimal or  absence of nausea and vomiting  Description: INTERVENTIONS:  - Maintain adequate hydration with IV or PO as ordered and tolerated  - Nasogastric tube to low intermittent suction as ordered  - Evaluate effectiveness of ordered antiemetic medications  - Provide nonpharmacologic comfort measures as appropriate  - Advance diet as tolerated, if ordered  - Obtain nutritional consult as needed  - Evaluate fluid balance  Outcome: Progressing

## 2024-03-28 NOTE — PLAN OF CARE
Lab called with critical value of absolute neutrophil 0.47, yest they were 0.43. Will notify MD on rounds and continue to monitor pt

## 2024-03-28 NOTE — PROGRESS NOTES
03/28/24 0514 03/28/24 0516 03/28/24 0517   Vital Signs   Pulse 73 73 73   Heart Rate Source Monitor  --   --    Cardiac Rhythm A paced  --   --    Resp 19  --   --    Respiratory Quality Normal  --   --    /70 108/53 98/58   MAP (mmHg) 80 71 71   BP Location Right arm Right arm Right arm   BP Method Automatic Automatic Automatic   Patient Position Lying Sitting Standing       Morning orthostatic b/p check

## 2024-03-29 LAB
ADENOVIRUS F 40/41 PCR: NEGATIVE
ALBUMIN SERPL-MCNC: 2.6 G/DL (ref 3.4–5)
ALBUMIN/GLOB SERPL: 1 {RATIO} (ref 1–2)
ALP LIVER SERPL-CCNC: 56 U/L
ALT SERPL-CCNC: 25 U/L
ANION GAP SERPL CALC-SCNC: 5 MMOL/L (ref 0–18)
AST SERPL-CCNC: 25 U/L (ref 15–37)
ASTROVIRUS PCR: NEGATIVE
BASOPHILS # BLD AUTO: 0.01 X10(3) UL (ref 0–0.2)
BASOPHILS NFR BLD AUTO: 0.7 %
BILIRUB SERPL-MCNC: 0.6 MG/DL (ref 0.1–2)
BUN BLD-MCNC: 14 MG/DL (ref 9–23)
C CAYETANENSIS DNA SPEC QL NAA+PROBE: NEGATIVE
CALCIUM BLD-MCNC: 8.4 MG/DL (ref 8.5–10.1)
CAMPY SP DNA.DIARRHEA STL QL NAA+PROBE: NEGATIVE
CHLORIDE SERPL-SCNC: 118 MMOL/L (ref 98–112)
CO2 SERPL-SCNC: 22 MMOL/L (ref 21–32)
CREAT BLD-MCNC: 0.71 MG/DL
CRYPTOSP DNA SPEC QL NAA+PROBE: NEGATIVE
EAEC PAA PLAS AGGR+AATA ST NAA+NON-PRB: NEGATIVE
EC STX1+STX2 + H7 FLIC SPEC NAA+PROBE: NEGATIVE
EGFRCR SERPLBLD CKD-EPI 2021: 84 ML/MIN/1.73M2 (ref 60–?)
ENTAMOEBA HISTOLYTICA PCR: NEGATIVE
EOSINOPHIL # BLD AUTO: 0.22 X10(3) UL (ref 0–0.7)
EOSINOPHIL NFR BLD AUTO: 15.9 %
EPEC EAE GENE STL QL NAA+NON-PROBE: NEGATIVE
ERYTHROCYTE [DISTWIDTH] IN BLOOD BY AUTOMATED COUNT: 13.2 %
ETEC LTA+ST1A+ST1B TOX ST NAA+NON-PROBE: NEGATIVE
GIARDIA LAMBLIA PCR: NEGATIVE
GLOBULIN PLAS-MCNC: 2.6 G/DL (ref 2.8–4.4)
GLUCOSE BLD-MCNC: 103 MG/DL (ref 70–99)
GLUCOSE BLD-MCNC: 103 MG/DL (ref 70–99)
GLUCOSE BLD-MCNC: 106 MG/DL (ref 70–99)
GLUCOSE BLD-MCNC: 186 MG/DL (ref 70–99)
HCT VFR BLD AUTO: 27 %
HGB BLD-MCNC: 9.3 G/DL
IMM GRANULOCYTES # BLD AUTO: 0.03 X10(3) UL (ref 0–1)
IMM GRANULOCYTES NFR BLD: 2.2 %
L PNEUMO AG UR QL: NEGATIVE
LYMPHOCYTES # BLD AUTO: 0.61 X10(3) UL (ref 1–4)
LYMPHOCYTES NFR BLD AUTO: 44.2 %
MAGNESIUM SERPL-MCNC: 1.5 MG/DL (ref 1.6–2.6)
MCH RBC QN AUTO: 31.2 PG (ref 26–34)
MCHC RBC AUTO-ENTMCNC: 34.4 G/DL (ref 31–37)
MCV RBC AUTO: 90.6 FL
MONOCYTES # BLD AUTO: 0.32 X10(3) UL (ref 0.1–1)
MONOCYTES NFR BLD AUTO: 23.2 %
NEUTROPHILS # BLD AUTO: 0.19 X10 (3) UL (ref 1.5–7.7)
NEUTROPHILS # BLD AUTO: 0.19 X10(3) UL (ref 1.5–7.7)
NEUTROPHILS NFR BLD AUTO: 13.8 %
NOROVIRUS GI/GII PCR: NEGATIVE
OSMOLALITY SERPL CALC.SUM OF ELEC: 301 MOSM/KG (ref 275–295)
P SHIGELLOIDES DNA STL QL NAA+PROBE: NEGATIVE
PHOSPHATE SERPL-MCNC: 2.3 MG/DL (ref 2.5–4.9)
PLATELET # BLD AUTO: 47 10(3)UL (ref 150–450)
POTASSIUM SERPL-SCNC: 4.3 MMOL/L (ref 3.5–5.1)
POTASSIUM SERPL-SCNC: 4.3 MMOL/L (ref 3.5–5.1)
PROT SERPL-MCNC: 5.2 G/DL (ref 6.4–8.2)
RBC # BLD AUTO: 2.98 X10(6)UL
ROTAVIRUS A PCR: NEGATIVE
SALMONELLA DNA SPEC QL NAA+PROBE: NEGATIVE
SAPOVIRUS PCR: NEGATIVE
SHIGELLA SP+EIEC IPAH ST NAA+NON-PROBE: NEGATIVE
SODIUM SERPL-SCNC: 145 MMOL/L (ref 136–145)
STREP PNEUMO ANTIGEN, URINE: NEGATIVE
V CHOLERAE DNA SPEC QL NAA+PROBE: NEGATIVE
VIBRIO DNA SPEC NAA+PROBE: NEGATIVE
WBC # BLD AUTO: 1.4 X10(3) UL (ref 4–11)
YERSINIA DNA SPEC NAA+PROBE: NEGATIVE

## 2024-03-29 PROCEDURE — 99233 SBSQ HOSP IP/OBS HIGH 50: CPT | Performed by: HOSPITALIST

## 2024-03-29 RX ORDER — ASPIRIN 81 MG/1
81 TABLET ORAL
Status: DISCONTINUED | OUTPATIENT
Start: 2024-04-01 | End: 2024-03-30

## 2024-03-29 RX ORDER — METOPROLOL SUCCINATE 50 MG/1
50 TABLET, EXTENDED RELEASE ORAL NIGHTLY
Qty: 30 TABLET | Refills: 0 | Status: SHIPPED | OUTPATIENT
Start: 2024-03-29

## 2024-03-29 RX ORDER — MAGNESIUM OXIDE 400 MG/1
800 TABLET ORAL ONCE
Status: COMPLETED | OUTPATIENT
Start: 2024-03-29 | End: 2024-03-29

## 2024-03-29 RX ORDER — ASPIRIN 81 MG/1
81 TABLET ORAL
Qty: 12 TABLET | Refills: 0 | Status: SHIPPED | OUTPATIENT
Start: 2024-04-01

## 2024-03-29 NOTE — PLAN OF CARE
Patient alert and oriented x4, forgetful. Up with assist/walker. Apaced on tele. Maintaining o2 sats on RA. Continent of bowel/bladder. Neutropenic precautions. Denies pain. IV abx given. IVF. Updated patient and patient's daughter via telephone on POC, verbalized understanding. Safety precautions put in place, bed alarm on.       Problem: Diabetes/Glucose Control  Goal: Glucose maintained within prescribed range  Description: INTERVENTIONS:  - Monitor Blood Glucose as ordered  - Assess for signs and symptoms of hyperglycemia and hypoglycemia  - Administer ordered medications to maintain glucose within target range  - Assess barriers to adequate nutritional intake and initiate nutrition consult as needed  - Instruct patient on self management of diabetes  Outcome: Progressing     Problem: Patient/Family Goals  Goal: Patient/Family Long Term Goal  Description: Patient's Long Term Goal: stay out of hospital    Interventions:  - follow up appointments  - med compliance   - See additional Care Plan goals for specific interventions  Outcome: Progressing  Goal: Patient/Family Short Term Goal  Description: Patient's Short Term Goal: discharge    Interventions:   - IVF & abx  - Echo  - See additional Care Plan goals for specific interventions  Outcome: Progressing     Problem: GASTROINTESTINAL - ADULT  Goal: Minimal or absence of nausea and vomiting  Description: INTERVENTIONS:  - Maintain adequate hydration with IV or PO as ordered and tolerated  - Nasogastric tube to low intermittent suction as ordered  - Evaluate effectiveness of ordered antiemetic medications  - Provide nonpharmacologic comfort measures as appropriate  - Advance diet as tolerated, if ordered  - Obtain nutritional consult as needed  - Evaluate fluid balance  Outcome: Progressing

## 2024-03-29 NOTE — PROGRESS NOTES
Progress Note  Sol Dee Patient Status:  Observation    3/11/1940 MRN [id number]   Location [facility] 8NE-A Attending Rodo Baldwin MD   Hosp Day # 2 PCP Artemio Reeder MD     Subjective:  Mrs. Dee feels improved, but still weak. No chest pain, palpitations, or hematochezia/melena/hematemasis.    Objective:  Physical Exam:   /75 (BP Location: Right arm)   Pulse 91   Temp 98.1 °F (36.7 °C) (Oral)   Resp 19   Ht 5' 1\" (1.549 m)   Wt 159 lb 2.8 oz (72.2 kg)   SpO2 95%   BMI 30.08 kg/m²   Temp (24hrs), Av.1 °F (36.7 °C), Min:97.7 °F (36.5 °C), Max:98.8 °F (37.1 °C)       Intake/Output Summary (Last 24 hours) at 3/29/2024 1000  Last data filed at 3/28/2024 1200  Gross per 24 hour   Intake 240 ml   Output --   Net 240 ml     Wt Readings from Last 3 Encounters:   24 159 lb 2.8 oz (72.2 kg)   23 155 lb (70.3 kg)   23 160 lb 8 oz (72.8 kg)     Telemetry: Atrial paced  General: Chronically ill appearing elderly female who is alert and oriented in no apparent distress sitting comfortably in bed.  HEENT: No focal deficits.  Neck: No JVD, carotids 2+ no bruits.  Cardiac: Regular rate and rhythm, S1, S2 normal, no murmur, rub or gallop.  Lungs: Clear without wheezes, rales, rhonchi or dullness.  Normal excursions and effort.  Abdomen: Soft, non-tender.   Extremities: Without clubbing, cyanosis or edema.  Peripheral pulses are 2+.  Neurologic: Alert and oriented, normal affect.  Skin: Warm and dry.        Intake/Output:    Intake/Output Summary (Last 24 hours) at 3/29/2024 1000  Last data filed at 3/28/2024 1200  Gross per 24 hour   Intake 240 ml   Output --   Net 240 ml       Last 3 Weights   24 0753 159 lb 2.8 oz (72.2 kg)   24 1220 156 lb 12 oz (71.1 kg)   24 1727 149 lb 14.6 oz (68 kg)   24 1028 148 lb (67.1 kg)   23 0934 155 lb (70.3 kg)   23 2125 160 lb 8 oz (72.8 kg)   23 1839 155 lb (70.3 kg)       Labs:  Recent Labs    Lab 03/27/24  0554 03/28/24  0550 03/29/24  0506   GLU 90 111* 103*   BUN 26* 19 14   CREATSERUM 0.84 0.80 0.71   EGFRCR 68 73 84   CA 8.4* 8.3* 8.4*    143 145   K 4.0 3.7 4.3  4.3    115* 118*   CO2 26.0 23.0 22.0     Recent Labs   Lab 03/27/24  0554 03/28/24  0550 03/29/24  0506   RBC 3.25* 2.94* 2.98*   HGB 10.0* 9.0* 9.3*   HCT 29.2* 27.2* 27.0*   MCV 89.8 92.5 90.6   MCH 30.8 30.6 31.2   MCHC 34.2 33.1 34.4   RDW 12.9 13.1 13.2   NEPRELIM 0.43* 0.47* 0.19*   WBC 1.6* 1.4* 1.4*   PLT 52.0* 50.0* 47.0*         Recent Labs   Lab 03/26/24  1047 03/26/24  1711 03/26/24  2005   TROPHS 20 22 22       Diagnostics:   ECHOCARDIOGRAM 3/27/2024:  1. Left ventricle: The cavity size was normal. Wall thickness was normal.      Systolic function was normal. The estimated ejection fraction was 55-60%,  by visual assessment. Hypokinesis of the basalinferolateral and inferior walls. Features are consistent with a pseudonormal left ventricular filling pattern, with concomitant abnormal relaxation and increased filling pressure - grade 2 diastolic dysfunction. LVEDP at least 25.   2. Right ventricle: Pacer C/W ICD noted in the right ventricle.   3. Right atrium: Pacer C/W ICD noted in right atrium.   4. Mitral valve: There was mild to moderate regurgitation, directed      centrally and posteriorly.   5. Tricuspid valve: There was moderate regurgitation.   6. Pulmonary arteries: Systolic pressure was markedly increased, in the      range of 60mm Hg to 65mm Hg. Estimated pulmonary artery diastolic      pressure was 13mm Hg.   7. Pericardium, extracardiac: There was a right pleural effusion.   Impressions:  This study is compared with previous dated 2/13/23: Compared to the previous study, these findings represent improvement.     [facility] 12/13/2023:  1.     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  2.     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  3.     A 100% LAD.  4.     Patent stent to the  circumflex.  5.     Patent subclavian stent with patent LIMA to the LAD.  6.     Occluded right coronary artery with collaterals from the circumflex and the LAD.  Will discuss  PCI in the future.     [facility] Cardiac PET 11/14/2023              This is an abnormal perfusion study.               Small fixed inferobasal defect of moderate severity. Small fixed lateral wall perfusion defect of mild severity. Small reversible inferoseptal perfusion defect of mild severity.              The left ventricular cavity is noted to be normal on the stress studies. The stress left ventricular ejection fraction was calculated to be 42% and left ventricular global function is mildly reduced. The rest left ventricular cavity is noted to be normal. The rest left ventricular ejection fraction was calculated to be 43% and rest left ventricular global function is mildly reduced. There was inferobasal, septal and lateral wall hypokinesia.          [facility] Echo 6/26/2023  1.          The study quality is average.   2.          The left ventricle is normal in size.  Mild LV hypertrophy  Global left ventricular systolic function is mildly decreased. The left ventricular ejection fraction is 45-50%. The left ventricle diastolic function is impaired (Grade I) with normal left atrial pressure.  There  is mild  hypokinesis of basal and mid inferior wall region and mid lateral segment.  3.          The left atrial diameter is mildly increased. Left atrial diameter is 4.2 cm.   4.          There is aortic sclerosis.. Mild calcification of the aortic valve is noted with adequate cuspal excursion.   5.          Mild calcification of the mitral valve is noted. Mild mitral regurgitation.  6.           Mild (1+) tricuspid regurgitation.  7.          The pulmonary artery systolic pressure is 30 mmHg, normal.   8.          As compared to prior study - 02/2023 - LV systolic function improved LVEF increased from 25-30% to 45-50%, moderate MR decreased to mild  degree, moderate TR decreased to mild degree, atria sizes decreased, pulmonary HTN decreased from 53mmHg to 30mmHg and LV filling pressures decreased.        [facility] Cardiac PET 9/2022              This is an abnormal perfusion study.               Small, fixed inferobasal perfusion defect of mild intensity (RCA distribution).  Small fixed lateral wall perfusion defect of mild intensity (LCX).              There is paradoxical septal motion with inferobasal and lateral wall hypokinesia. The resting LVEF is 49%. The LVEF at stress is 51%.  Medications:   filgrastim-aafi  480 mcg Subcutaneous Daily    sodium phosphate  15 mmol Intravenous Once    [Held by provider] apixaban  2.5 mg Oral BID    ampicillin-sulbactam  3 g Intravenous Q6H    aspirin  81 mg Oral Nightly    FLUoxetine  20 mg Oral QAM    metoprolol succinate ER  50 mg Oral Nightly    [Held by provider] sacubitril-valsartan  1 tablet Oral BID    insulin aspart  2-10 Units Subcutaneous TID AC and HS           Assessment/Plan:    Weakness, dyspnea, nausea/vomiting, and diarrhea  Recently completed a course chemotherapy  Less likely to be cardiac- supported by improved LVEF on echo.  CAD s/p CABG and PCI's  LHC in Dec 2023 with plan for  April  Last PCI was in 3/2023  Chest pain free. Troponin negative. ECG without dynamic changes  Chronic HFimpEF LVEF 50-55% this admission prior 40-45% improved from initial of 35%  Compensated today  AICD in place  GDMT: On Entresto and metoprolol outpatient- reintroduce once tolerated by BP  Endometrial cancer  Undergoing chemotherapy  HTN  BP at goal  ROSETTA on CKD III  Cr WNL today  PAF  Currently atrial paced  On Eliquis historically 5 mg BID for CVA risk reduction  Pancytopenia  Chemotherapy related  Monitor - noted thrombocytopenia <50 K today    PLAN  After review of the risks and benefits with the patient today and Dr. Arciniega it is reasonable to continue Eliquis at 2.5 mg BID  Hold ASA 81 mg for now 3 x weekly M,W,  F  Could consider outpatient watchman evaluation given her risk of bleeding and CVA  CBCd next week  ED precautions and symptoms of bleeding reviewed  We will sign off from our perspective with plan for follow up in the afib clinic next week    Sixto Nettles PA-C  3/29/2024  10:01 AM

## 2024-03-29 NOTE — PROGRESS NOTES
[facility] Report of Consultation      Patient Name: Sol Dee   YOB: 1940  Medical Record Number: [id number]  Consulting Physician: Jason Quinn M.D.   Referring Physician: Rodo Baldwin MD    The 21st Century Cures Act makes medical notes like these available to patients in the interest of transparency. Please be advised this is a medical document. Medical documents are intended to carry relevant information, facts as evident, and the clinical opinion of the practitioner. The medical note is intended as peer to peer communication and may appear blunt or direct. It is written in medical language and may contain abbreviations or verbiage that are unfamiliar.      Patient receiving adjuvant chemotherapy with carboplatin and paclitaxel under the care of gynecologic oncology at [facility]. Admitted with nausea, diarrhea, generalized fatigue.     Hematology consulted for pancytopenia.     Low counts expected after chemotherapy. GCSF started for neutropenia and should continue until ANC >= 2000. Transfuse for Hb < 7.0 and platelets < 10. Cardiology is managing apixaban.     Hematology will continue to follow peripherally.     Electronically signed by:    Jason Quinn M.D.  Systemic Medical Director of Oncology Services  [facility]

## 2024-03-29 NOTE — PLAN OF CARE
PT A&OX4 RA, Apaced on tele. Bilateral breath sound clear/diminished. Non-productive cough reported  Denied CP. Denied SOB. Pt reported BM in AM  Updated POC to pt, all needs meet at this time.   Call light within reach, bed alarm on, and low position for pt safety.   X1 with walker  Neutropenic Precaution    POC:   IV 0.9 NS   IV ABX Q6  Orthos q shift     Problem: Diabetes/Glucose Control  Goal: Glucose maintained within prescribed range  Description: INTERVENTIONS:  - Monitor Blood Glucose as ordered  - Assess for signs and symptoms of hyperglycemia and hypoglycemia  - Administer ordered medications to maintain glucose within target range  - Assess barriers to adequate nutritional intake and initiate nutrition consult as needed  - Instruct patient on self management of diabetes  Outcome: Progressing     Problem: Patient/Family Goals  Goal: Patient/Family Long Term Goal  Description: Patient's Long Term Goal: stay out of hospital    Interventions:  - follow up appointments  - med compliance   - See additional Care Plan goals for specific interventions  Outcome: Progressing  Goal: Patient/Family Short Term Goal  Description: Patient's Short Term Goal: discharge    Interventions:   - IVF & abx  - Echo  - See additional Care Plan goals for specific interventions  Outcome: Progressing     Problem: CARDIOVASCULAR - ADULT  Goal: Maintains optimal cardiac output and hemodynamic stability  Description: INTERVENTIONS:  - Monitor vital signs, rhythm, and trends  - Monitor for bleeding, hypotension and signs of decreased cardiac output  - Evaluate effectiveness of vasoactive medications to optimize hemodynamic stability  - Monitor arterial and/or venous puncture sites for bleeding and/or hematoma  - Assess quality of pulses, skin color and temperature  - Assess for signs of decreased coronary artery perfusion - ex. Angina  - Evaluate fluid balance, assess for edema, trend weights  Outcome: Progressing     Problem:  GASTROINTESTINAL - ADULT  Goal: Minimal or absence of nausea and vomiting  Description: INTERVENTIONS:  - Maintain adequate hydration with IV or PO as ordered and tolerated  - Nasogastric tube to low intermittent suction as ordered  - Evaluate effectiveness of ordered antiemetic medications  - Provide nonpharmacologic comfort measures as appropriate  - Advance diet as tolerated, if ordered  - Obtain nutritional consult as needed  - Evaluate fluid balance  Outcome: Progressing

## 2024-03-29 NOTE — PROGRESS NOTES
[facility]   part of [facility]     Hospitalist Progress Note     Sol Dee Patient Status:  Observation    3/11/1940 MRN [id number]   [facility] 8NE-A Attending Rodo Baldwin MD   Hosp Day # 2 PCP Artemio Reeder MD     Chief Complaint: weakness    Subjective:     Patient no sob. Still very weak    Objective:    Review of Systems:   A comprehensive review of systems was completed; pertinent positive and negatives stated in subjective.    Vital signs:  Temp:  [97.6 °F (36.4 °C)-98.8 °F (37.1 °C)] 97.6 °F (36.4 °C)  Pulse:  [70-91] 70  Resp:  [18-20] 20  BP: (106-133)/(39-90) 120/67  SpO2:  [93 %-100 %] 98 %    Physical Exam:    General: No acute distress  Respiratory: No wheezes, no rhonchi  Cardiovascular: S1, S2, regular rate and rhythm  Abdomen: Soft, Non-tender, non-distended, positive bowel sounds  Neuro: No new focal deficits.   Extremities: No edema      Diagnostic Data:    Labs:  Recent Labs   Lab 24  1047 24  0554 24  0550 24  0506   WBC 2.1* 1.6* 1.4* 1.4*   HGB 12.8 10.0* 9.0* 9.3*   MCV 85.5 89.8 92.5 90.6   PLT 80.0* 52.0* 50.0* 47.0*       Recent Labs   Lab 24  1047 24  0554 24  0550 24  0506   * 90 111* 103*   BUN 30* 26* 19 14   CREATSERUM 1.12* 0.84 0.80 0.71   CA 9.8 8.4* 8.3* 8.4*   ALB 3.4 2.7*  --  2.6*   * 143 143 145   K 4.4 4.0 3.7 4.3  4.3    112 115* 118*   CO2 26.0 26.0 23.0 22.0   ALKPHO 78 65  --  56   AST 27 23  --  25   ALT 36 28  --  25   BILT 1.0 0.7  --  0.6   TP 6.6 5.2*  --  5.2*       Estimated Creatinine Clearance: 44.5 mL/min (based on SCr of 0.71 mg/dL).    Recent Labs   Lab 24  1047 24  1711 24   TROPHS 20 22 22       No results for input(s): \"PTP\", \"INR\" in the last 168 hours.               Microbiology    Hospital Encounter on 24   1. Blood Culture     Status: None (Preliminary result)    Collection Time: 24  5:15 PM    Specimen:  Blood,peripheral   Result Value Ref Range    Blood Culture Result No Growth 2 Days N/A         Imaging: Reviewed in Epic.    Medications:    filgrastim-aafi  480 mcg Subcutaneous Daily    sodium phosphate  15 mmol Intravenous Once    [START ON 4/1/2024] aspirin  81 mg Oral Once per day on Monday Wednesday Friday    apixaban  2.5 mg Oral BID    ampicillin-sulbactam  3 g Intravenous Q6H    FLUoxetine  20 mg Oral QAM    metoprolol succinate ER  50 mg Oral Nightly    [Held by provider] sacubitril-valsartan  1 tablet Oral BID    insulin aspart  2-10 Units Subcutaneous TID AC and HS       Assessment & Plan:      # Shortness of breath, generalized weakness  -Suspect secondary to chemo which just completed last week with dehydration vs PNA as below  -Cards on consult, does not feel this is cardiac  -dc IV fluids  -PT/OT     #dehydration  #diarrhea  #orthostatic hypotension  -cdiff neg, check stool PCR  -IVF  -orthostatics q shift, neg  -diarrhea resolved     #Left lower lobe atelectasis versus pneumonia  -neg  procal  -empiric abx. Will complete short course since neutropenic  -bcxs negative ngtd  -repeat 2V CXR same. Reviewed independently, looks like PNA to me    #neutropenia  #thrombocytopenia  -counts dropping 2/2 chemo  -consult heme/onc  -transfuse <10  -GCSF  -d/w cards in detail. Plts <50. Plan low dose eliquis and 3x/week as. Discussed risk/benefits w/ pt & family in detail     #CAD status post CABG  -Known  of RCA and occluded SVG  -eliquis as above     #History of hepatitis C  #Autoimmune hepatitis  #CKD 3  #Hypertension  #Hyperlipidemia  #DM2  -Insulin sliding scale  #Primary biliary cirrhosis  #Endometrial cancer    No DC today, possibly 1-2 days    Rodo Baldwin MD    Supplementary Documentation:     Quality:  DVT Mechanical Prophylaxis:   SCDs,    DVT Pharmacologic Prophylaxis   Medication    apixaban (Eliquis) tab 2.5 mg         DVT Pharmacologic prophylaxis: Aspirin 81 mg      Code Status: Prior  Lopez:  No urinary catheter in place  Lopez Duration (in days):   Central line:    ROSELIA: 3/29/2024    Discharge is dependent on: course  At this point Ms. Dee is expected to be discharge to: home    The 21st Century Cures Act makes medical notes like these available to patients in the interest of transparency. Please be advised this is a medical document. Medical documents are intended to carry relevant information, facts as evident, and the clinical opinion of the practitioner. The medical note is intended as peer to peer communication and may appear blunt or direct. It is written in medical language and may contain abbreviations or verbiage that are unfamiliar.

## 2024-03-29 NOTE — PROGRESS NOTES
03/29/24 0748 03/29/24 0752 03/29/24 0753   Vital Signs   Respiratory Quality Normal Normal Normal   /57 114/39 106/75   MAP (mmHg) 75 (!) 62 86   BP Location Right arm Right arm Right arm   BP Method Automatic Automatic Automatic   Patient Position Lying Sitting Standing     Orthostatic BP

## 2024-03-29 NOTE — PLAN OF CARE
RN SHIFT NOTE    Assumed care of pt at 0700. No c/o pain at this time. A&Ox 4, forgetful at times. A-paced on tele w/ occasional pvc's. LUE non-pitting edema present. Abdomen soft and round. Last BM on 3/29. Skin is C/D/I. Orthostatic BP (-). PLT 47. Eliquis held in AM, will be resumed in PM. Tolerating medications and care needs have been met at this time.    POC: Neutropenic precautions, Iv abx, DW, IVF dc'd, elevate left arm, orthos q shift.      Addendum    1202: Patient declined BG check. Patient given education and still declined. MD aware.      Problem: Diabetes/Glucose Control  Goal: Glucose maintained within prescribed range  Description: INTERVENTIONS:  - Monitor Blood Glucose as ordered  - Assess for signs and symptoms of hyperglycemia and hypoglycemia  - Administer ordered medications to maintain glucose within target range  - Assess barriers to adequate nutritional intake and initiate nutrition consult as needed  - Instruct patient on self management of diabetes  Outcome: Progressing     Problem: Patient/Family Goals  Goal: Patient/Family Long Term Goal  Description: Patient's Long Term Goal: stay out of hospital    Interventions:  - follow up appointments  - med compliance   - See additional Care Plan goals for specific interventions  Outcome: Progressing  Goal: Patient/Family Short Term Goal  Description: Patient's Short Term Goal: discharge    Interventions:   - IVF & abx  - Echo  - See additional Care Plan goals for specific interventions  Outcome: Progressing     Problem: CARDIOVASCULAR - ADULT  Goal: Maintains optimal cardiac output and hemodynamic stability  Description: INTERVENTIONS:  - Monitor vital signs, rhythm, and trends  - Monitor for bleeding, hypotension and signs of decreased cardiac output  - Evaluate effectiveness of vasoactive medications to optimize hemodynamic stability  - Monitor arterial and/or venous puncture sites for bleeding and/or hematoma  - Assess quality of pulses, skin  color and temperature  - Assess for signs of decreased coronary artery perfusion - ex. Angina  - Evaluate fluid balance, assess for edema, trend weights  Outcome: Progressing     Problem: GASTROINTESTINAL - ADULT  Goal: Minimal or absence of nausea and vomiting  Description: INTERVENTIONS:  - Maintain adequate hydration with IV or PO as ordered and tolerated  - Nasogastric tube to low intermittent suction as ordered  - Evaluate effectiveness of ordered antiemetic medications  - Provide nonpharmacologic comfort measures as appropriate  - Advance diet as tolerated, if ordered  - Obtain nutritional consult as needed  - Evaluate fluid balance  Outcome: Progressing

## 2024-03-29 NOTE — HOME CARE LIAISON
[facility]. Spoke w/ patient, daughter Linda, and son in law who is agreeable with [facility]. Contact information placed on AVS.

## 2024-03-30 VITALS
BODY MASS INDEX: 30.34 KG/M2 | TEMPERATURE: 99 F | RESPIRATION RATE: 20 BRPM | WEIGHT: 160.69 LBS | HEIGHT: 61 IN | OXYGEN SATURATION: 83 % | HEART RATE: 91 BPM | SYSTOLIC BLOOD PRESSURE: 123 MMHG | DIASTOLIC BLOOD PRESSURE: 68 MMHG

## 2024-03-30 LAB
ANION GAP SERPL CALC-SCNC: 6 MMOL/L (ref 0–18)
BASOPHILS # BLD: 0.03 X10(3) UL (ref 0–0.2)
BASOPHILS NFR BLD: 2 %
BUN BLD-MCNC: 18 MG/DL (ref 9–23)
CALCIUM BLD-MCNC: 9 MG/DL (ref 8.5–10.1)
CHLORIDE SERPL-SCNC: 112 MMOL/L (ref 98–112)
CO2 SERPL-SCNC: 24 MMOL/L (ref 21–32)
CREAT BLD-MCNC: 0.78 MG/DL
EGFRCR SERPLBLD CKD-EPI 2021: 75 ML/MIN/1.73M2 (ref 60–?)
EOSINOPHIL # BLD: 0.22 X10(3) UL (ref 0–0.7)
EOSINOPHIL NFR BLD: 13 %
ERYTHROCYTE [DISTWIDTH] IN BLOOD BY AUTOMATED COUNT: 13.5 %
GLUCOSE BLD-MCNC: 130 MG/DL (ref 70–99)
GLUCOSE BLD-MCNC: 134 MG/DL (ref 70–99)
HCT VFR BLD AUTO: 28.4 %
HGB BLD-MCNC: 9.5 G/DL
LYMPHOCYTES NFR BLD: 0.73 X10(3) UL (ref 1–4)
LYMPHOCYTES NFR BLD: 43 %
MAGNESIUM SERPL-MCNC: 1.4 MG/DL (ref 1.6–2.6)
MCH RBC QN AUTO: 31 PG (ref 26–34)
MCHC RBC AUTO-ENTMCNC: 33.5 G/DL (ref 31–37)
MCV RBC AUTO: 92.8 FL
MONOCYTES # BLD: 0.26 X10(3) UL (ref 0.1–1)
MONOCYTES NFR BLD: 15 %
MORPHOLOGY: NORMAL
MYELOCYTES # BLD: 0.02 X10(3) UL
MYELOCYTES NFR BLD: 1 %
NEUTROPHILS # BLD AUTO: 0.32 X10 (3) UL (ref 1.5–7.7)
NEUTROPHILS NFR BLD: 16 %
NEUTS BAND NFR BLD: 10 %
NEUTS HYPERSEG # BLD: 0.44 X10(3) UL (ref 1.5–7.7)
OSMOLALITY SERPL CALC.SUM OF ELEC: 298 MOSM/KG (ref 275–295)
PHOSPHATE SERPL-MCNC: 2.9 MG/DL (ref 2.5–4.9)
PLATELET # BLD AUTO: 59 10(3)UL (ref 150–450)
PLATELET MORPHOLOGY: NORMAL
POTASSIUM SERPL-SCNC: 3.8 MMOL/L (ref 3.5–5.1)
RBC # BLD AUTO: 3.06 X10(6)UL
SODIUM SERPL-SCNC: 142 MMOL/L (ref 136–145)
TOTAL CELLS COUNTED BLD: 100
WBC # BLD AUTO: 1.7 X10(3) UL (ref 4–11)

## 2024-03-30 PROCEDURE — 99239 HOSP IP/OBS DSCHRG MGMT >30: CPT | Performed by: HOSPITALIST

## 2024-03-30 RX ORDER — AMOXICILLIN AND CLAVULANATE POTASSIUM 875; 125 MG/1; MG/1
1 TABLET, FILM COATED ORAL 2 TIMES DAILY
Qty: 4 TABLET | Refills: 0 | Status: SHIPPED | OUTPATIENT
Start: 2024-03-30 | End: 2024-04-01

## 2024-03-30 NOTE — DISCHARGE SUMMARY
[place] HOSPITALIST  DISCHARGE SUMMARY     Sol Dee Patient Status:  Inpatient    3/11/1940 MRN [id number]   Location [facility] 8NE-A Attending No att. providers found   Hosp Day # 3 PCP Artemio Reeder MD     Date of Admission: 3/26/2024  Date of Discharge:  3/30/2024     Discharge Disposition: Home Health Care Services    Discharge Diagnosis:  # Shortness of breath, generalized weakness  -Suspect secondary to chemo which just completed last week with dehydration vs PNA as below  -Cards on consult, does not feel this is cardiac  -dc IV fluids  -PT/OT     #dehydration  #diarrhea  #orthostatic hypotension  -cdiff neg, check stool PCR  -IVF  -orthostatics q shift, neg  -diarrhea resolved     #Left lower lobe atelectasis versus pneumonia  -neg  procal  -empiric abx. Will complete short course since neutropenic  -bcxs negative ngtd  -repeat 2V CXR same. Reviewed independently, looks like PNA to me     #neutropenia  #thrombocytopenia  -2/2 chemo  -consult heme/onc  -transfuse <10  -GCSF  -counts now up trending. Plts >50  -d/w cards in detail. Plts <50. Plan low dose eliquis and 3x/week as. Discussed risk/benefits w/ pt & family in detail     #CAD status post CABG  -Known  of RCA and occluded SVG  -eliquis as above     #History of hepatitis C  #Autoimmune hepatitis  #CKD 3  #Hypertension  #Hyperlipidemia  #DM2  -Insulin sliding scale  #Primary biliary cirrhosis  #Endometrial cancer    History of Present Illness: Sol Dee is a 84 year old female with past medical history hepatitis C, CAD status post CABG, autoimmune hepatitis, CKD 3, hypertension, hyperlipidemia, DM2, primary biliary cirrhosis, endometrial cancer who presents with shortness of breath.  Had a cath in December with  of RCA and occluded SVG.  For the past 5 days has been short of breath with exertion and very weak.  Associated with some nausea vomiting and diarrhea.  Denies chest pain or lower extremity edema.  Just  finished her last round of chemo last week. Diarrhea lasted 3 days, resolved today.     Brief Synopsis: pt w/ weakness, likely 2/2 chemo. Pancytopenic and counts dropped. GCSF started. Counts started to improve. AC adjusted due to low plts. Also w/ possible PNA. Started empiric course of abx. Pt to DC and should f/u with her heme/onc to monitor counts.     Lace+ Score: 75  59-90 High Risk  29-58 Medium Risk  0-28   Low Risk  Patient was referred to the [facility].    TCM Follow-Up Recommendation:  LACE > 58: High Risk of readmission after discharge from the hospital.      Procedures during hospitalization:   none    Incidental or significant findings and recommendations (brief descriptions):  none    Lab/Test results pending at Discharge:   none    Consultants:  marito De Souza    Discharge Medication List:     Discharge Medications        START taking these medications        Instructions Prescription details   amoxicillin clavulanate 875-125 MG Tabs  Commonly known as: Augmentin      Take 1 tablet by mouth 2 (two) times daily for 2 days.   Stop taking on: April 1, 2024  Quantity: 4 tablet  Refills: 0     aspirin 81 MG Tbec  Start taking on: April 1, 2024  Replaces: aspirin 81 MG Tabs      Take 1 tablet (81 mg total) by mouth 3 (three) times a week.   Quantity: 12 tablet  Refills: 0            CHANGE how you take these medications        Instructions Prescription details   apixaban 5 MG Tabs  Commonly known as: Eliquis  What changed: how much to take      Take 0.5 tablets (2.5 mg total) by mouth 2 (two) times daily.   Refills: 0     metoprolol succinate ER 50 MG Tb24  Commonly known as: Toprol XL  What changed:   medication strength  how much to take  additional instructions      Take 1 tablet (50 mg total) by mouth nightly.   Quantity: 30 tablet  Refills: 0            CONTINUE taking these medications        Instructions Prescription details   Entresto 24-26 MG Tabs  Generic drug:  sacubitril-valsartan      Take 1 tablet by mouth 2 (two) times daily.   Quantity: 60 tablet  Refills: 3     Evolocumab 140 MG/ML Sosy      Inject 1 mL into the skin every 14 (fourteen) days.   Refills: 0     FLUoxetine 20 MG Caps  Commonly known as: PROzac      Take 1 capsule (20 mg total) by mouth every morning.   Quantity: 90 capsule  Refills: 3     Lancets Micro Thin 33G Misc      1 each by Does not apply route daily. E11.22   Quantity: 100 each  Refills: 3     Magnesium 100 MG Tabs      Take 1 tablet (100 mg total) by mouth daily.   Refills: 0     Multivitamin/Iron Tabs      Take 1 tablet by mouth daily.   Refills: 0     omega-3 fatty acids 1000 MG Caps  Commonly known as: Fish Oil      Take 1,000 mg by mouth daily.   Refills: 0     Vitamin C 500 MG Tabs  Commonly known as: VITAMIN C      Take 1 tablet (500 mg total) by mouth daily.   Refills: 0     Vitamin D 1000 units Caps      Take 1,000 Units by mouth daily.   Refills: 0            STOP taking these medications      aspirin 81 MG Tabs  Replaced by: aspirin 81 MG Tbec                  Where to Get Your Medications        These medications were sent to [id number] - [place], [place] - [address] [phone], [phone]  [address] [place] [place] [address]      Phone: [phone]   amoxicillin clavulanate 875-125 MG Tabs  aspirin 81 MG Tbec  metoprolol succinate ER 50 MG Tb24         [place] reviewed[place]    Follow-up appointment:   Mirtha Robbins MD  [facility] [address] [address] [phone]    Follow up in 1 week(s)      Artemio Reeder MD  [address] [facility]  [address]  [place] [address] [phone]    Schedule an appointment as soon as possible for a visit      Appointments for Next 30 Days 3/30/2024 - 4/29/2024      None            Vital signs:  Temp:  [97.6 °F (36.4 °C)-98.8 °F (37.1 °C)] 98.6 °F (37 °C)  Pulse:  [72-95] 91  Resp:  [18-20] 20  BP: (109-143)/(45-92) 123/68  SpO2:  [83 %-97 %] 83 %    Physical  Exam:    General: No acute distress   Lungs: clear to auscultation  Cardiovascular: S1, S2  Abdomen: Soft      -----------------------------------------------------------------------------------------------  PATIENT DISCHARGE INSTRUCTIONS: See electronic chart    Rodo Baldwin MD    Total time spent on discharge plannin minutes     The  [employer] Cures Act makes medical notes like these available to patients in the interest of transparency. Please be advised this is a medical document. Medical documents are intended to carry relevant information, facts as evident, and the clinical opinion of the practitioner. The medical note is intended as peer to peer communication and may appear blunt or direct. It is written in medical language and may contain abbreviations or verbiage that are unfamiliar.    [id number] Comprehensive

## 2024-03-30 NOTE — PROGRESS NOTES
03/30/24 0421 03/30/24 0422 03/30/24 0423   Vital Signs   Temp 98.2 °F (36.8 °C)  --   --    Temp src Oral  --   --    Pulse 77 83 92   Heart Rate Source Monitor  --   --    Cardiac Rhythm A paced  --   --    Resp 20  --   --    Respiratory Quality Normal  --   --    /57  --  127/67   MAP (mmHg) 82  --  85   BP Location Right arm  --  Right arm   BP Method Automatic  --  Automatic   Patient Position Lying  --  Sitting      03/30/24 0424 03/30/24 0425   Vital Signs   Temp  --   --    Temp src  --   --    Pulse 88 86   Heart Rate Source  --   --    Cardiac Rhythm  --   --    Resp  --   --    Respiratory Quality  --   --    BP  --  109/45   MAP (mmHg)  --  66   BP Location  --  Right arm   BP Method  --  Automatic   Patient Position  --  Standing

## 2024-03-30 NOTE — PLAN OF CARE
PT A&OX4 RA, Apaced on tele. Bilateral breath sound clear/diminished. Non-productive cough reported  Denied CP. Denied SOB. Pt reported BM in AM  Updated POC to pt, all needs meet at this time.   Call light within reach, bed alarm on, and low position for pt safety.   X1 with walker  Neutropenic Precaution  PT , refused insulin      POC:   IV ABX Q6  Orthos q shift  Weight  Left arm swelling elevated with pillow    Problem: Diabetes/Glucose Control  Goal: Glucose maintained within prescribed range  Description: INTERVENTIONS:  - Monitor Blood Glucose as ordered  - Assess for signs and symptoms of hyperglycemia and hypoglycemia  - Administer ordered medications to maintain glucose within target range  - Assess barriers to adequate nutritional intake and initiate nutrition consult as needed  - Instruct patient on self management of diabetes  Outcome: Progressing     Problem: Patient/Family Goals  Goal: Patient/Family Long Term Goal  Description: Patient's Long Term Goal: stay out of hospital    Interventions:  - follow up appointments  - med compliance   - See additional Care Plan goals for specific interventions  Outcome: Progressing  Goal: Patient/Family Short Term Goal  Description: Patient's Short Term Goal: discharge    Interventions:   - IVF & abx  - Echo  - See additional Care Plan goals for specific interventions  Outcome: Progressing     Problem: CARDIOVASCULAR - ADULT  Goal: Maintains optimal cardiac output and hemodynamic stability  Description: INTERVENTIONS:  - Monitor vital signs, rhythm, and trends  - Monitor for bleeding, hypotension and signs of decreased cardiac output  - Evaluate effectiveness of vasoactive medications to optimize hemodynamic stability  - Monitor arterial and/or venous puncture sites for bleeding and/or hematoma  - Assess quality of pulses, skin color and temperature  - Assess for signs of decreased coronary artery perfusion - ex. Angina  - Evaluate fluid balance, assess for  edema, trend weights  Outcome: Progressing     Problem: GASTROINTESTINAL - ADULT  Goal: Minimal or absence of nausea and vomiting  Description: INTERVENTIONS:  - Maintain adequate hydration with IV or PO as ordered and tolerated  - Nasogastric tube to low intermittent suction as ordered  - Evaluate effectiveness of ordered antiemetic medications  - Provide nonpharmacologic comfort measures as appropriate  - Advance diet as tolerated, if ordered  - Obtain nutritional consult as needed  - Evaluate fluid balance  Outcome: Progressing

## 2024-03-30 NOTE — PHYSICAL THERAPY NOTE
PHYSICAL THERAPY TREATMENT NOTE - INPATIENT    Room Number: 8619/8619-A       Session: 2    Number of Visits to Meet Established Goals: 3    Presenting Problem: dyspnea, dehydration  Co-Morbidities : Endometrial CA s/p chemo, a. fib, CHF, HTN, CKD, CAD s/p CABG, ICD, cirrhosis, hepatits C, DM (s/p total hysterectomy, salpingooectomy)    PHYSICAL THERAPY MEDICAL/SOCIAL HISTORY  History related to current admission: Patient is a 84 year old female admitted on 3/26/2024 from home for dyspnea.  Pt diagnosed with dehydration, possible pneumonia, and orthostatic hypotension.        HOME SITUATION  Type of Home: House   Home Layout: Two level     Lives With: Alone  Drives: Yes     Prior Level of [place]: Pt lives alone in 2 story home. Pt independent with ADL and mobility. Pt does not use RW or cane at baseline. Pt reports supportive children that live nearby,        ASSESSMENT     Patient is currently functioning near baseline with bed mobility, transfers, and gait.    Patient currently does not meet criteria for skilled inpatient physical therapy services, however patient will continue to benefit from QID ambulation with nursing staff.  Pt is discharged from IP PT services.  RN aware to re-order if there is a decline in mobility status.      Pt's daughter and YAMILETH present for session - appreciative       PLAN  PT Treatment Plan: Bed mobility;Endurance;Energy conservation;Patient education;Gait training;Strengthening;Balance training;Transfer training;Stair training  Rehab Potential : Good  Frequency (Obs): 3-5x/week    CURRENT GOALS     Goal #1 Patient is able to demonstrate supine - sit EOB @ level: supervision- MET      Goal #2 Patient is able to demonstrate transfers EOB to/from BSC at assistance level: supervision      Goal #3 Patient is able to ambulate 150 feet with assist device: LRAD at assistance level: supervision      Goal #4 Pt will ascend/descend 1 flight of stairs with supervision   Goal #5     Goal  #6     Goal Comments: Goals established on 3/27/2024       3/30/2024 all goals ongoing - plans to stay on main level, work with HH on further stair training     SUBJECTIVE  \"I love marshmallows\"    OBJECTIVE  Precautions: Bed/chair alarm    WEIGHT BEARING RESTRICTION  Weight Bearing Restriction: None                PAIN ASSESSMENT   Ratin  Location: denied       BALANCE                                                                                                                       Static Sitting: Good  Dynamic Sitting: Good           Static Standing: Fair -  Dynamic Standing: Poor +    ACTIVITY TOLERANCE                         O2 WALK         AM-PAC '6-Clicks' INPATIENT SHORT FORM - BASIC MOBILITY  How much difficulty does the patient currently have...  Patient Difficulty: Turning over in bed (including adjusting bedclothes, sheets and blankets)?: None   Patient Difficulty: Sitting down on and standing up from a chair with arms (e.g., wheelchair, bedside commode, etc.): A Little   Patient Difficulty: Moving from lying on back to sitting on the side of the bed?: None   How much help from another person does the patient currently need...   Help from Another: Moving to and from a bed to a chair (including a wheelchair)?: A Little   Help from Another: Need to walk in hospital room?: A Little   Help from Another: Climbing 3-5 steps with a railing?: A Little       AM-PAC Score:  Raw Score: 20   Approx Degree of Impairment: 35.83%   Standardized Score (AM-PAC Scale): 47.67   CMS Modifier (G-Code): CJ    FUNCTIONAL ABILITY STATUS  Gait Assessment   Functional Mobility/Gait Assessment  Gait Assistance: Supervision  Distance (ft): 150 x 2  Assistive Device: Rolling walker  Pattern: Shuffle (cues for upright posture - staying in close proximity to RW)  Stairs:  (discussed maintaining on first level until improved endurance)    Skilled Therapy Provided    Bed Mobility:  Rolling: NT   Supine<>Sit: NT   Sit<>Supine:  NT     Transfer Mobility:  Sit<>Stand: SBA   Stand<>Sit: SBA   Gait: SBA    Therapist's Comments:       THERAPEUTIC EXERCISES  Lower Extremity Ankle pumps  Glut sets     Upper Extremity none     Position Sitting     Repetitions   10   Sets   1     Patient End of Session: Up in chair;Needs met;Call light within reach;RN aware of session/findings;All patient questions and concerns addressed;Alarm set;Family present    PT Session Time: 15 minutes  Gait Training: 15 minutes  Therapeutic Activity: 0 minutes  Therapeutic Exercise: 0 minutes   Neuromuscular Re-education: 0 minutes

## 2024-03-30 NOTE — PLAN OF CARE
Pt is A&O X4, on RA, A-paced per tele. Voiced no c/o`s. Discharge planning. Cpm.  Problem: Diabetes/Glucose Control  Goal: Glucose maintained within prescribed range  Description: INTERVENTIONS:  - Monitor Blood Glucose as ordered  - Assess for signs and symptoms of hyperglycemia and hypoglycemia  - Administer ordered medications to maintain glucose within target range  - Assess barriers to adequate nutritional intake and initiate nutrition consult as needed  - Instruct patient on self management of diabetes  Outcome: Progressing     Problem: Patient/Family Goals  Goal: Patient/Family Long Term Goal  Description: Patient's Long Term Goal: stay out of hospital    Interventions:  - follow up appointments  - med compliance   - See additional Care Plan goals for specific interventions  Outcome: Progressing  Goal: Patient/Family Short Term Goal  Description: Patient's Short Term Goal: discharge    Interventions:   - IVF & abx  - Echo  - See additional Care Plan goals for specific interventions  Outcome: Progressing     Problem: CARDIOVASCULAR - ADULT  Goal: Maintains optimal cardiac output and hemodynamic stability  Description: INTERVENTIONS:  - Monitor vital signs, rhythm, and trends  - Monitor for bleeding, hypotension and signs of decreased cardiac output  - Evaluate effectiveness of vasoactive medications to optimize hemodynamic stability  - Monitor arterial and/or venous puncture sites for bleeding and/or hematoma  - Assess quality of pulses, skin color and temperature  - Assess for signs of decreased coronary artery perfusion - ex. Angina  - Evaluate fluid balance, assess for edema, trend weights  Outcome: Progressing     Problem: GASTROINTESTINAL - ADULT  Goal: Minimal or absence of nausea and vomiting  Description: INTERVENTIONS:  - Maintain adequate hydration with IV or PO as ordered and tolerated  - Nasogastric tube to low intermittent suction as ordered  - Evaluate effectiveness of ordered antiemetic  medications  - Provide nonpharmacologic comfort measures as appropriate  - Advance diet as tolerated, if ordered  - Obtain nutritional consult as needed  - Evaluate fluid balance  Outcome: Progressing

## 2024-04-01 ENCOUNTER — PATIENT OUTREACH (OUTPATIENT)
Dept: CASE MANAGEMENT | Age: 84
End: 2024-04-01

## 2024-04-01 DIAGNOSIS — Z02.9 ENCOUNTERS FOR ADMINISTRATIVE PURPOSE: ICD-10-CM

## 2024-04-01 DIAGNOSIS — R06.09 DOE (DYSPNEA ON EXERTION): Primary | ICD-10-CM

## 2024-04-01 PROCEDURE — 1111F DSCHRG MED/CURRENT MED MERGE: CPT

## 2024-04-01 RX ORDER — LOPERAMIDE HYDROCHLORIDE 2 MG/1
2 CAPSULE ORAL 4 TIMES DAILY PRN
COMMUNITY

## 2024-04-01 NOTE — PROGRESS NOTES
Initial Post Discharge Follow Up   Discharge Date: 3/30/24  Contact Date: 4/1/2024    Consent Verification:  Assessment Completed With: Other: daughter Linda Permission received per patient?  verbal   HIPAA Verified?  Yes    Discharge Dx:   Dyspnea, unspecified type     General:   How have you been since your discharge from the hospital?  She's still weak. Her breathing is pretty good. We are making sure she walks. She does get winded but she is doing fine. She did have some diarrhea from the antibiotic but that's gone now with imodium.  Do you have any pain since discharge?  No    When you were leaving the hospital were your discharge instructions reviewed with you? Yes  How well were your discharge instructions explained to you?   On a scale of 1-5   1- Very Poor and 5- Very well   Very Well  Do you have any questions about your discharge instructions?  No  Before leaving the hospital was your diagnoses explained to you? Yes  Do you have any questions about your diagnoses? No  Are you able to perform normal daily activities of living as you have prior to your hospital stay (dressing, bathing, ambulating to the bathroom, etc)? yes  (NCM) Was patient given a different diet per AVS? no      Medications:   Current Outpatient Medications   Medication Sig Dispense Refill    amoxicillin clavulanate 875-125 MG Oral Tab Take 1 tablet by mouth 2 (two) times daily for 2 days. 4 tablet 0    aspirin 81 MG Oral Tab EC Take 1 tablet (81 mg total) by mouth 3 (three) times a week. 12 tablet 0    metoprolol succinate ER 50 MG Oral Tablet 24 Hr Take 1 tablet (50 mg total) by mouth nightly. 30 tablet 0    apixaban 5 MG Oral Tab Take 0.5 tablets (2.5 mg total) by mouth 2 (two) times daily.      sacubitril-valsartan (ENTRESTO) 24-26 MG Oral Tab Take 1 tablet by mouth 2 (two) times daily. 60 tablet 3    FLUoxetine HCl 20 MG Oral Cap Take 1 capsule (20 mg total) by mouth every morning. 90 capsule 3    Evolocumab 140 MG/ML Subcutaneous  Solution Prefilled Syringe Inject 1 mL into the skin every 14 (fourteen) days.      LANCETS MICRO THIN 33G Does not apply Misc 1 each by Does not apply route daily. E11.22 (Patient not taking: Reported on 3/27/2023) 100 each 3    Magnesium 100 MG Oral Tab Take 1 tablet (100 mg total) by mouth daily.      Multiple Vitamins-Iron (MULTIVITAMIN/IRON) Oral Tab Take 1 tablet by mouth daily.      Omega-3 Fatty Acids (FISH OIL) 1000 MG Oral Cap Take 1,000 mg by mouth daily.      ascorbic acid (VITAMIN C) 500 MG Oral Tab Take 1 tablet (500 mg total) by mouth daily.      Cholecalciferol (VITAMIN D) 1000 UNITS Oral Cap Take 1,000 Units by mouth daily.         Were there any changes to your current medication(s) noted on the AVS? Yes  If so, were these medication changes discussed with you prior to leaving the hospital? Yes  If a new medication was prescribed:    Was the new medication's purpose & side effects reviewed? Yes  Do you have any questions about your new medication? No  Did you  your discharge medications when you left the hospital? Yes  Let's go over your medications together to make sure we are not missing anything. Medications Reviewed  Are there any reasons that keep you from taking your medication as prescribed? No  Are you having any concerns with constipation? No      Discharge medications reviewed/discussed/and reconciled against outpatient medications with patient.  Any changes or updates to medications sent to PCP.  Patient Acknowledged     Referrals/orders at D/C:  Referrals/orders placed at D/C? yes  What services:   Home health   (If HH was ordered) Has HH been set up?  No, daughter states that she called [facility] today and is awaiting a cb. [facility] advised to call [facility] back should she not hear from them by 2p today. She v/u and agreed.     DME ordered at D/C? No      Discharge orders, AVS reviewed and discussed with patient. Any changes or updates to orders sent to PCP.  Patient Acknowledged      SDOH:    Transportation Needs: No Transportation Needs (3/26/2024)    Transportation Needs     Lack of Transportation: No     Financial Resource Strain: Low Risk  (4/1/2024)    Financial Resource Strain     Difficulty of Paying Living Expenses: Not hard at all     Med Affordability: No       Follow up appointments:      Your appointments       Date & Time Appointment Department ([place])    Apr 12, 2024 11:40 AM CDT Hospital Follow Up with Lanny Ba,  [facility], [facility] ([facility] [address])              [facility], [facility], [facility], [facility] [address]  [address]  [facility] [address]  [phone]            TCC  Was TCC ordered: Yes  Was TCC scheduled: No, Explain-dtr states that pt does not need a sooner appt than the one scheduled on 4/12 with PCP as she is seeing cardiologist tomorrow.       PCP (If no TCC appointment)  Does patient already have a PCP appointment scheduled? Yes  NCM Confirmed PCP office TCM appointment with patient      Specialist    Does the patient have any other follow up appointment(s) needing to be scheduled? Yes  If yes: NCM reviewed upcoming specialist appointment with patient: Yes  Does the patient need assistance scheduling appointment(s): No, pt already has appt with Dr. Altamirano (cards) tomorrow and oncology on 4/9/24    Is there any reason as to why you cannot make your appointment(s)?  No     Needs post D/C:   Now that you are home, are there any needs or concerns you need addressed before your next visit with your PCP?  (DME, meds, questions, etc.): No    Interventions by NCM:   NCM reviewed discharge instructions and when to seek medical attention with the patient's daughter Linda. She states that the patient is doing better. She is still weak but getting around. She states that they are making sure that she is walking around at least 250 ft. She states that she does get winded but  quickly recovers. She states that pt did have diarrhea but that resolved with the use of Imodium stating that the diarrhea was from the antibiotic. She states that patient does not have an appetite from the Chemo and that causes her to have nausea but now they have her on an eating schedule and that has resolved the nausea. She denied that the patient has had any fever, vomiting, HA, lightheadedness or any new or worsening symptoms. Med review completed. She denied having any questions or concerns at this time.       CCM referral placed:    No    BOOK BY DATE: 4/13/24

## 2024-04-01 NOTE — PROGRESS NOTES
Pt's daughter Linda returned [facility] call. [facility] called back. Linda stated that the issue with [facility] was that she needed to have her PCP sign off on orders and she doesn't have an appt with new PCP until 4/12. She did state, however, that she s/w previous PCP that pt saw in June 2024 and that MD will be signing off on the orders. [facility] did discuss TCC and she stated that if it doesn't work with old PCP then she will call [facility] back and schedule with TCC. [facility] closing encounter.

## 2024-04-02 ENCOUNTER — LAB ENCOUNTER (OUTPATIENT)
Dept: LAB | Age: 84
End: 2024-04-02
Attending: PHYSICIAN ASSISTANT
Payer: MEDICARE

## 2024-04-02 DIAGNOSIS — T45.1X5A CHEMOTHERAPY INDUCED NEUTROPENIA (HCC): ICD-10-CM

## 2024-04-02 DIAGNOSIS — D70.1 CHEMOTHERAPY INDUCED NEUTROPENIA (HCC): ICD-10-CM

## 2024-04-02 LAB
BASOPHILS # BLD AUTO: 0.09 X10(3) UL (ref 0–0.2)
BASOPHILS NFR BLD AUTO: 0.9 %
DEPRECATED RDW RBC AUTO: 47.8 FL (ref 35.1–46.3)
EOSINOPHIL # BLD AUTO: 0.07 X10(3) UL (ref 0–0.7)
EOSINOPHIL NFR BLD AUTO: 0.7 %
ERYTHROCYTE [DISTWIDTH] IN BLOOD BY AUTOMATED COUNT: 14.9 % (ref 11–15)
HCT VFR BLD AUTO: 29.8 %
HGB BLD-MCNC: 10 G/DL
IMM GRANULOCYTES # BLD AUTO: 0.11 X10(3) UL (ref 0–1)
IMM GRANULOCYTES NFR BLD: 1.2 %
LYMPHOCYTES # BLD AUTO: 1.05 X10(3) UL (ref 1–4)
LYMPHOCYTES NFR BLD AUTO: 11.1 %
MCH RBC QN AUTO: 30.5 PG (ref 26–34)
MCHC RBC AUTO-ENTMCNC: 33.6 G/DL (ref 31–37)
MCV RBC AUTO: 90.9 FL
MONOCYTES # BLD AUTO: 1.24 X10(3) UL (ref 0.1–1)
MONOCYTES NFR BLD AUTO: 13.1 %
NEUTROPHILS # BLD AUTO: 6.94 X10 (3) UL (ref 1.5–7.7)
NEUTROPHILS # BLD AUTO: 6.94 X10(3) UL (ref 1.5–7.7)
NEUTROPHILS NFR BLD AUTO: 73 %
PLATELET # BLD AUTO: 110 10(3)UL (ref 150–450)
PLATELETS.RETICULATED NFR BLD AUTO: 9.9 % (ref 0–7)
RBC # BLD AUTO: 3.28 X10(6)UL
WBC # BLD AUTO: 9.5 X10(3) UL (ref 4–11)

## 2024-04-02 PROCEDURE — 85025 COMPLETE CBC W/AUTO DIFF WBC: CPT

## 2024-04-02 PROCEDURE — 36415 COLL VENOUS BLD VENIPUNCTURE: CPT

## 2024-04-12 ENCOUNTER — OFFICE VISIT (OUTPATIENT)
Dept: FAMILY MEDICINE CLINIC | Facility: CLINIC | Age: 84
End: 2024-04-12
Payer: MEDICARE

## 2024-04-12 VITALS
BODY MASS INDEX: 28.51 KG/M2 | DIASTOLIC BLOOD PRESSURE: 72 MMHG | RESPIRATION RATE: 16 BRPM | HEART RATE: 86 BPM | WEIGHT: 151 LBS | SYSTOLIC BLOOD PRESSURE: 130 MMHG | TEMPERATURE: 97 F | HEIGHT: 61 IN

## 2024-04-12 DIAGNOSIS — R53.1 WEAKNESS GENERALIZED: Primary | ICD-10-CM

## 2024-04-12 DIAGNOSIS — R14.0 ABDOMINAL BLOATING: ICD-10-CM

## 2024-04-12 DIAGNOSIS — D61.810 ANTINEOPLASTIC CHEMOTHERAPY INDUCED PANCYTOPENIA (CODE) (HCC): ICD-10-CM

## 2024-04-12 DIAGNOSIS — F41.9 ANXIETY: ICD-10-CM

## 2024-04-12 DIAGNOSIS — R11.0 NAUSEA: ICD-10-CM

## 2024-04-12 RX ORDER — FUROSEMIDE 20 MG/1
20 TABLET ORAL EVERY OTHER DAY
COMMUNITY
Start: 2024-04-02

## 2024-04-12 RX ORDER — SPIRONOLACTONE 25 MG/1
TABLET ORAL
COMMUNITY
Start: 2024-04-02

## 2024-04-12 RX ORDER — FLUOXETINE HYDROCHLORIDE 40 MG/1
40 CAPSULE ORAL EVERY MORNING
Qty: 90 CAPSULE | Refills: 0 | Status: SHIPPED | OUTPATIENT
Start: 2024-04-12

## 2024-04-12 RX ORDER — ONDANSETRON 4 MG/1
4 TABLET, ORALLY DISINTEGRATING ORAL EVERY 8 HOURS PRN
Qty: 20 TABLET | Refills: 0 | Status: SHIPPED | OUTPATIENT
Start: 2024-04-12

## 2024-04-12 NOTE — PATIENT INSTRUCTIONS
Start a probiotic (for gut health, preferably refrigerated). This is to help with bloating.     Start a pepcid 1-2 times a day. This is for nausea.    I will send in a medication for nausea if needed (zofran, also called ondansetron)

## 2024-04-12 NOTE — PROGRESS NOTES
Subjective:   Sol Dee is a 84 year old female who presents for hospital follow up.   She was discharged from [facility] to [facility] Health Care Services  Admission Date: 3/26/24   Discharge Date: 3/30/24  Hospital Discharge Diagnosis: Generalized weakness, shortness of breath, dehydration, diarrhea, orthostatic hypotension, neutropenia, thrombocytopenia, questionable left lower lobe pneumonia    Interactive contact within 2 business days post discharge first initiated on Date: 4/1/2024    During the visit, the following was completed:  Obtained and reviewed discharge summary, continuity of care documents, and Hospitalist notes  Reviewed Labs (CBC, CMP)    HPI:   Please see discharge summary for complete details.  New patient to me today.  Medical history discussed further below.  Has been undergoing chemotherapy for endometrial cancer.  After her last treatment, developed dyspnea with exertion, weakness, nausea, vomiting and diarrhea.  History complicated by coronary artery disease status post CABG with stents to both graft and native heart.  Felt weakness was due to chemotherapy and not related to a cardiac cause.  She is found to be pancytopenic.  Was started on antibiotics for possible pneumonia.  Given GCFS. Counts rebounded prior to discharge with hemoglobin of 10.    Patient lives alone.  Has had home health nurse and PT.  Feels PT has been difficult because she still is feeling quite weak.  Also feeling fatigued, nauseous.  Saw oncology earlier this week. Labs from this vist reviewed. WBCs decreased from discharge (white blood cell 3.3, hemoglobin 10.1, platelet 119)    Concerns today:  - Anxiety has increased significantly.  Was given Valium to take prior to her last chemo session.  This causes jitteriness and for her to feel worse.  In general has a worsening sense of anxiety.  Review of chart indicates she is on fluoxetine 20 mg.  Patient notes she has been on this for a long time.  - Nausea and  abdominal bloating: Has been going on for a while.  Feel he is worsened since she was in the hospital.  Nausea comes and goes.  Bloating is always present particular along the mid abdomen.  Notes she had diarrhea in the hospital but this is resolved.  Denies vomiting.  Was previously given Zofran but she did not fill it.  Has not been taking anything for nausea    PMHx:  CAD s/p CABG and multiple (20) stents, CHF.: Has pace maker, defib.  Follows with Dr. Longoria, Dr. Arciniega and Dr. Robbins.     Endometrial cancer- hysterectomy just after Christmas 2023. Had 3 sessions of chemo. This last sesion wiped her out completely. Will be starting radiation for 4 sessions starting next week. Being done at [place].     Had treatment for hepatitis C (autoimmune hepatitis and PBC on list but patient does not recall this)- was unable to finish treatment due to side effects.       History/Other:   Current Medications:  Medication Reconciliation:  I am aware of an inpatient discharge within the last 30 days.  The discharge medication list has been reconciled with the patient's current medication list and reviewed by me.  See medication list for additions of new medication, and changes to current doses of medications and discontinued medications.  Outpatient Medications Marked as Taking for the 4/12/24 encounter (Office Visit) with Lanny Ba, DO   Medication Sig    spironolactone 25 MG Oral Tab     furosemide 20 MG Oral Tab Take 1 tablet (20 mg total) by mouth every other day.    ondansetron 4 MG Oral Tablet Dispersible Take 1 tablet (4 mg total) by mouth every 8 (eight) hours as needed.    FLUoxetine 40 MG Oral Cap Take 1 capsule (40 mg total) by mouth every morning.    aspirin 81 MG Oral Tab EC Take 1 tablet (81 mg total) by mouth 3 (three) times a week.    metoprolol succinate ER 50 MG Oral Tablet 24 Hr Take 1 tablet (50 mg total) by mouth nightly.    apixaban 5 MG Oral Tab Take 0.5 tablets (2.5 mg total) by mouth 2 (two)  times daily.    sacubitril-valsartan (ENTRESTO) 24-26 MG Oral Tab Take 1 tablet by mouth 2 (two) times daily.    Evolocumab 140 MG/ML Subcutaneous Solution Prefilled Syringe Inject 1 mL into the skin every 14 (fourteen) days.    LANCETS MICRO THIN 33G Does not apply Misc 1 each by Does not apply route daily. E11.22    Magnesium 100 MG Oral Tab Take 1 tablet (100 mg total) by mouth daily.    Multiple Vitamins-Iron (MULTIVITAMIN/IRON) Oral Tab Take 1 tablet by mouth daily.    Omega-3 Fatty Acids (FISH OIL) 1000 MG Oral Cap Take 1,000 mg by mouth daily.    ascorbic acid (VITAMIN C) 500 MG Oral Tab Take 1 tablet (500 mg total) by mouth daily.    Cholecalciferol (VITAMIN D) 1000 UNITS Oral Cap Take 1,000 Units by mouth daily.         Review of Systems:  GENERAL: weight stable, energy stable, no sweating  SKIN: denies any unusual skin lesions  EYES: denies blurred vision or double vision  HEENT: denies nasal congestion, sinus pain or ST  LUNGS: denies shortness of breath with exertion  CARDIOVASCULAR: denies chest pain on exertion or palpitations  GI: denies abdominal pain, denies heartburn, denies diarrhea  MUSCULOSKELETAL: denies pain, normal range of motion of extremities  NEURO: denies headaches, denies dizziness, denies weakness  PSYCHE: denies depression or anxiety  HEMATOLOGIC: denies hx of anemia, or bruising, denies bleeding  ENDOCRINE: denies thyroid history  ALL/ASTHMA: denies hx of allergy or asthma    Objective:   No LMP recorded. Patient is postmenopausal.  Estimated body mass index is 28.53 kg/m² as calculated from the following:    Height as of this encounter: 5' 1\" (1.549 m).    Weight as of this encounter: 151 lb (68.5 kg).   /72   Pulse 86   Temp 97.1 °F (36.2 °C)   Resp 16   Ht 5' 1\" (1.549 m)   Wt 151 lb (68.5 kg)   BMI 28.53 kg/m²    GENERAL: well developed, well nourished, in no apparent distress  SKIN: no rashes, no suspicious lesions  HEENT: atraumatic, normocephalic, ears and throat  are clear  EYES: PERRLA, EOMI, conjunctiva are clear  NECK: supple, no adenopathy, no bruits  CHEST: no chest tenderness  LUNGS: clear to auscultation  CARDIO: RRR without murmur  GI: good BS's, no masses, HSM or tenderness  MUSCULOSKELETAL: back is not tender, FROM of the extremities  EXTREMITIES: no cyanosis, clubbing or edema  NEURO: Oriented times three, cranial nerves are intact, motor and sensory are grossly intact    Assessment & Plan:   1. Weakness generalized (Primary)  Continue with PT, rest as needed. 2/2 chemo therapy which has now finished. Cont with oncology.    2. Nausea  -    Will give trial of  Ondansetron; Take 1 tablet (4 mg total) by mouth every 8 (eight) hours as needed.  Dispense: 20 tablet; Refill: 0  -To also try pepcid to see if GERD is involved.    3. Anxiety  -    Increase FLUoxetine HCl; Take 1 capsule (40 mg total) by mouth every morning.  Dispense: 90 capsule; Refill: 0    4. Antineoplastic chemotherapy induced pancytopenia (CODE) (HCC)  Slight decrease in WBC but other counts stable. Cont with oncology.    5. Abdominal bloating  Possibly due to change in GI lu. Recommend probiotic for now.     RTC in 1 month.

## 2024-04-16 ENCOUNTER — LAB ENCOUNTER (OUTPATIENT)
Dept: LAB | Age: 84
End: 2024-04-16
Attending: INTERNAL MEDICINE
Payer: MEDICARE

## 2024-04-16 DIAGNOSIS — N18.30 STAGE 3 CHRONIC KIDNEY DISEASE (HCC): ICD-10-CM

## 2024-04-16 DIAGNOSIS — E11.22 TYPE 2 DIABETES MELLITUS WITH STAGE 3 CHRONIC KIDNEY DISEASE, WITHOUT LONG-TERM CURRENT USE OF INSULIN (HCC): ICD-10-CM

## 2024-04-16 DIAGNOSIS — N18.30 TYPE 2 DIABETES MELLITUS WITH STAGE 3 CHRONIC KIDNEY DISEASE, WITHOUT LONG-TERM CURRENT USE OF INSULIN (HCC): ICD-10-CM

## 2024-04-16 DIAGNOSIS — R53.83 FATIGUE: Primary | ICD-10-CM

## 2024-04-16 LAB
ANION GAP SERPL CALC-SCNC: 0 MMOL/L (ref 0–18)
BASOPHILS # BLD AUTO: 0.06 X10(3) UL (ref 0–0.2)
BASOPHILS NFR BLD AUTO: 1.4 %
BUN BLD-MCNC: 25 MG/DL (ref 9–23)
BUN/CREAT SERPL: 26.3 (ref 10–20)
CALCIUM BLD-MCNC: 10.3 MG/DL (ref 8.7–10.4)
CHLORIDE SERPL-SCNC: 107 MMOL/L (ref 98–112)
CO2 SERPL-SCNC: 31 MMOL/L (ref 21–32)
CREAT BLD-MCNC: 0.95 MG/DL
DEPRECATED RDW RBC AUTO: 58.3 FL (ref 35.1–46.3)
EGFRCR SERPLBLD CKD-EPI 2021: 59 ML/MIN/1.73M2 (ref 60–?)
EOSINOPHIL # BLD AUTO: 0.18 X10(3) UL (ref 0–0.7)
EOSINOPHIL NFR BLD AUTO: 4.2 %
ERYTHROCYTE [DISTWIDTH] IN BLOOD BY AUTOMATED COUNT: 16.4 % (ref 11–15)
FASTING STATUS PATIENT QL REPORTED: NO
GLUCOSE BLD-MCNC: 128 MG/DL (ref 70–99)
HCT VFR BLD AUTO: 38.2 %
HGB BLD-MCNC: 12.3 G/DL
IMM GRANULOCYTES # BLD AUTO: 0.01 X10(3) UL (ref 0–1)
IMM GRANULOCYTES NFR BLD: 0.2 %
LYMPHOCYTES # BLD AUTO: 1.09 X10(3) UL (ref 1–4)
LYMPHOCYTES NFR BLD AUTO: 25.5 %
MCH RBC QN AUTO: 31 PG (ref 26–34)
MCHC RBC AUTO-ENTMCNC: 32.2 G/DL (ref 31–37)
MCV RBC AUTO: 96.2 FL
MONOCYTES # BLD AUTO: 0.65 X10(3) UL (ref 0.1–1)
MONOCYTES NFR BLD AUTO: 15.2 %
NEUTROPHILS # BLD AUTO: 2.28 X10 (3) UL (ref 1.5–7.7)
NEUTROPHILS # BLD AUTO: 2.28 X10(3) UL (ref 1.5–7.7)
NEUTROPHILS NFR BLD AUTO: 53.5 %
OSMOLALITY SERPL CALC.SUM OF ELEC: 292 MOSM/KG (ref 275–295)
PLATELET # BLD AUTO: 237 10(3)UL (ref 150–450)
POTASSIUM SERPL-SCNC: 4.1 MMOL/L (ref 3.5–5.1)
RBC # BLD AUTO: 3.97 X10(6)UL
SODIUM SERPL-SCNC: 138 MMOL/L (ref 136–145)
WBC # BLD AUTO: 4.3 X10(3) UL (ref 4–11)

## 2024-04-16 PROCEDURE — 36415 COLL VENOUS BLD VENIPUNCTURE: CPT

## 2024-04-16 PROCEDURE — 80048 BASIC METABOLIC PNL TOTAL CA: CPT

## 2024-04-16 PROCEDURE — 85025 COMPLETE CBC W/AUTO DIFF WBC: CPT

## 2024-05-14 ENCOUNTER — TELEPHONE (OUTPATIENT)
Dept: FAMILY MEDICINE CLINIC | Facility: CLINIC | Age: 84
End: 2024-05-14

## 2024-05-14 NOTE — TELEPHONE ENCOUNTER
Luly from [facility] call on a mutual patient requesting another 60 days of home care .  Request a re certification verbal order is ok .    Patient have 2 weeks left of home care     B/P 92/64    Please call back at [phone]

## 2024-05-15 RX ORDER — EVOLOCUMAB 140 MG/ML
INJECTION, SOLUTION SUBCUTANEOUS
COMMUNITY
Start: 2024-03-18

## 2024-07-02 ENCOUNTER — LAB ENCOUNTER (OUTPATIENT)
Dept: LAB | Age: 84
End: 2024-07-02
Attending: INTERNAL MEDICINE
Payer: MEDICARE

## 2024-07-02 DIAGNOSIS — E78.00 PURE HYPERCHOLESTEROLEMIA: Primary | ICD-10-CM

## 2024-07-02 DIAGNOSIS — N18.30 TYPE 2 DIABETES MELLITUS WITH STAGE 3 CHRONIC KIDNEY DISEASE, WITHOUT LONG-TERM CURRENT USE OF INSULIN (HCC): ICD-10-CM

## 2024-07-02 DIAGNOSIS — E11.22 TYPE 2 DIABETES MELLITUS WITH STAGE 3 CHRONIC KIDNEY DISEASE, WITHOUT LONG-TERM CURRENT USE OF INSULIN (HCC): ICD-10-CM

## 2024-07-02 LAB
ANION GAP SERPL CALC-SCNC: 6 MMOL/L (ref 0–18)
BUN BLD-MCNC: 17 MG/DL (ref 9–23)
BUN/CREAT SERPL: 16.2 (ref 10–20)
CALCIUM BLD-MCNC: 9.9 MG/DL (ref 8.7–10.4)
CHLORIDE SERPL-SCNC: 105 MMOL/L (ref 98–112)
CO2 SERPL-SCNC: 31 MMOL/L (ref 21–32)
CREAT BLD-MCNC: 1.05 MG/DL
EGFRCR SERPLBLD CKD-EPI 2021: 52 ML/MIN/1.73M2 (ref 60–?)
FASTING STATUS PATIENT QL REPORTED: YES
GLUCOSE BLD-MCNC: 104 MG/DL (ref 70–99)
OSMOLALITY SERPL CALC.SUM OF ELEC: 296 MOSM/KG (ref 275–295)
POTASSIUM SERPL-SCNC: 4.2 MMOL/L (ref 3.5–5.1)
SODIUM SERPL-SCNC: 142 MMOL/L (ref 136–145)

## 2024-07-02 PROCEDURE — 36415 COLL VENOUS BLD VENIPUNCTURE: CPT

## 2024-07-02 PROCEDURE — 80048 BASIC METABOLIC PNL TOTAL CA: CPT

## 2024-07-11 DIAGNOSIS — F41.9 ANXIETY: ICD-10-CM

## 2024-07-11 RX ORDER — FLUOXETINE HYDROCHLORIDE 40 MG/1
40 CAPSULE ORAL EVERY MORNING
Qty: 90 CAPSULE | Refills: 0 | Status: SHIPPED | OUTPATIENT
Start: 2024-07-11

## 2024-07-11 NOTE — TELEPHONE ENCOUNTER
Refill request for:    Requested Prescriptions     Pending Prescriptions Disp Refills    FLUOXETINE HCL 40 MG Oral Cap [Pharmacy Med Name: Fluoxetine Hydrochloride 40 Mg Cap Nort] 90 capsule 0     Sig: Take 1 capsule (40 mg total) by mouth every morning.        Last Prescribed Quantity Refills   4/12/24 90 0     LOV 5/28/2024     Patient was asked to follow-up in: 3 months    Appointment scheduled: 8/29/2024 Lanny Ba DO    Medication not on protocol.     # 90 with 0 refills routed to Lanny Ba DO for review

## 2024-07-17 ENCOUNTER — TELEPHONE (OUTPATIENT)
Dept: FAMILY MEDICINE CLINIC | Facility: CLINIC | Age: 84
End: 2024-07-17

## 2024-07-17 NOTE — TELEPHONE ENCOUNTER
Luly with [facility]. They are    discharging the patient the week of 7/29/24 and pt has hit the goals and readiness and she verbalizes understanding the plan to discharge.    This is a FYI but they will be sending the discharge paperwork for signature.

## 2024-07-29 ENCOUNTER — TELEPHONE (OUTPATIENT)
Dept: FAMILY MEDICINE CLINIC | Facility: CLINIC | Age: 84
End: 2024-07-29

## 2024-07-29 DIAGNOSIS — Z13.6 SCREENING FOR CARDIOVASCULAR CONDITION: ICD-10-CM

## 2024-07-29 DIAGNOSIS — Z13.0 SCREENING FOR IRON DEFICIENCY ANEMIA: ICD-10-CM

## 2024-07-29 DIAGNOSIS — Z13.29 SCREENING FOR THYROID DISORDER: ICD-10-CM

## 2024-07-29 DIAGNOSIS — Z13.1 SCREENING FOR DIABETES MELLITUS: ICD-10-CM

## 2024-07-29 DIAGNOSIS — E55.9 VITAMIN D DEFICIENCY: ICD-10-CM

## 2024-07-29 DIAGNOSIS — Z12.31 VISIT FOR SCREENING MAMMOGRAM: Primary | ICD-10-CM

## 2024-07-29 DIAGNOSIS — Z79.899 LONG-TERM USE OF HIGH-RISK MEDICATION: ICD-10-CM

## 2024-07-29 DIAGNOSIS — N18.31 STAGE 3A CHRONIC KIDNEY DISEASE (CKD) (HCC): ICD-10-CM

## 2024-07-29 DIAGNOSIS — I10 ESSENTIAL HYPERTENSION: ICD-10-CM

## 2024-07-29 DIAGNOSIS — E78.5 DYSLIPIDEMIA: ICD-10-CM

## 2024-07-29 NOTE — TELEPHONE ENCOUNTER
Please enter lab orders for the patient's upcoming physical appointment.     Physical scheduled:   Your appointments       Date & Time Appointment Department ([place])    Aug 29, 2024 10:40 AM CDT Medicare Annual Well Visit with Lanny Ba DO [facility], [facility] ([facility])              [facility], [facility]  [address] [place] [id number]  [phone]           Preferred lab: [facility] ([facility])     The patient has been notified to complete fasting labs prior to their physical appointment.

## 2024-07-31 ENCOUNTER — TELEPHONE (OUTPATIENT)
Dept: FAMILY MEDICINE CLINIC | Facility: CLINIC | Age: 84
End: 2024-07-31

## 2024-07-31 NOTE — TELEPHONE ENCOUNTER
Luly from [facility] is calling because pt is being discharged today     Just an fyi

## 2024-08-13 ENCOUNTER — HOSPITAL ENCOUNTER (OUTPATIENT)
Dept: MAMMOGRAPHY | Age: 84
Discharge: HOME | End: 2024-08-13
Attending: FAMILY MEDICINE
Payer: MEDICARE

## 2024-08-13 ENCOUNTER — LAB ENCOUNTER (OUTPATIENT)
Dept: LAB | Age: 84
End: 2024-08-13
Attending: FAMILY MEDICINE
Payer: MEDICARE

## 2024-08-13 DIAGNOSIS — Z12.31 VISIT FOR SCREENING MAMMOGRAM: ICD-10-CM

## 2024-08-13 DIAGNOSIS — I10 ESSENTIAL HYPERTENSION: ICD-10-CM

## 2024-08-13 DIAGNOSIS — Z13.0 SCREENING FOR IRON DEFICIENCY ANEMIA: ICD-10-CM

## 2024-08-13 DIAGNOSIS — Z13.6 SCREENING FOR CARDIOVASCULAR CONDITION: ICD-10-CM

## 2024-08-13 DIAGNOSIS — Z13.29 SCREENING FOR THYROID DISORDER: ICD-10-CM

## 2024-08-13 DIAGNOSIS — N18.31 STAGE 3A CHRONIC KIDNEY DISEASE (CKD) (HCC): ICD-10-CM

## 2024-08-13 DIAGNOSIS — Z79.899 LONG-TERM USE OF HIGH-RISK MEDICATION: ICD-10-CM

## 2024-08-13 DIAGNOSIS — E78.5 DYSLIPIDEMIA: ICD-10-CM

## 2024-08-13 DIAGNOSIS — Z13.1 SCREENING FOR DIABETES MELLITUS: ICD-10-CM

## 2024-08-13 DIAGNOSIS — E55.9 VITAMIN D DEFICIENCY: ICD-10-CM

## 2024-08-13 LAB
ALBUMIN SERPL-MCNC: 4.1 G/DL (ref 3.2–4.8)
ALBUMIN/GLOB SERPL: 1.5 {RATIO} (ref 1–2)
ALP LIVER SERPL-CCNC: 88 U/L
ALT SERPL-CCNC: 17 U/L
ANION GAP SERPL CALC-SCNC: 5 MMOL/L (ref 0–18)
AST SERPL-CCNC: 24 U/L (ref ?–34)
BASOPHILS # BLD AUTO: 0.05 X10(3) UL (ref 0–0.2)
BASOPHILS NFR BLD AUTO: 1.1 %
BILIRUB SERPL-MCNC: 0.8 MG/DL (ref 0.2–1.1)
BUN BLD-MCNC: 21 MG/DL (ref 9–23)
BUN/CREAT SERPL: 17.6 (ref 10–20)
CALCIUM BLD-MCNC: 10 MG/DL (ref 8.7–10.4)
CHLORIDE SERPL-SCNC: 108 MMOL/L (ref 98–112)
CHOLEST SERPL-MCNC: 283 MG/DL (ref ?–200)
CO2 SERPL-SCNC: 30 MMOL/L (ref 21–32)
CREAT BLD-MCNC: 1.19 MG/DL
CREAT UR-SCNC: 83.8 MG/DL
DEPRECATED RDW RBC AUTO: 42.5 FL (ref 35.1–46.3)
EGFRCR SERPLBLD CKD-EPI 2021: 45 ML/MIN/1.73M2 (ref 60–?)
EOSINOPHIL # BLD AUTO: 0.22 X10(3) UL (ref 0–0.7)
EOSINOPHIL NFR BLD AUTO: 5 %
ERYTHROCYTE [DISTWIDTH] IN BLOOD BY AUTOMATED COUNT: 12.2 % (ref 11–15)
FASTING PATIENT LIPID ANSWER: YES
FASTING STATUS PATIENT QL REPORTED: YES
GLOBULIN PLAS-MCNC: 2.7 G/DL (ref 2–3.5)
GLUCOSE BLD-MCNC: 106 MG/DL (ref 70–99)
HCT VFR BLD AUTO: 40.4 %
HDLC SERPL-MCNC: 62 MG/DL (ref 40–59)
HGB BLD-MCNC: 13.5 G/DL
IMM GRANULOCYTES # BLD AUTO: 0.01 X10(3) UL (ref 0–1)
IMM GRANULOCYTES NFR BLD: 0.2 %
LDLC SERPL CALC-MCNC: 207 MG/DL (ref ?–100)
LYMPHOCYTES # BLD AUTO: 1.21 X10(3) UL (ref 1–4)
LYMPHOCYTES NFR BLD AUTO: 27.4 %
MCH RBC QN AUTO: 31.3 PG (ref 26–34)
MCHC RBC AUTO-ENTMCNC: 33.4 G/DL (ref 31–37)
MCV RBC AUTO: 93.7 FL
MICROALBUMIN UR-MCNC: 6.8 MG/DL
MICROALBUMIN/CREAT 24H UR-RTO: 81.1 UG/MG (ref ?–30)
MONOCYTES # BLD AUTO: 0.44 X10(3) UL (ref 0.1–1)
MONOCYTES NFR BLD AUTO: 10 %
NEUTROPHILS # BLD AUTO: 2.49 X10 (3) UL (ref 1.5–7.7)
NEUTROPHILS # BLD AUTO: 2.49 X10(3) UL (ref 1.5–7.7)
NEUTROPHILS NFR BLD AUTO: 56.3 %
NONHDLC SERPL-MCNC: 221 MG/DL (ref ?–130)
OSMOLALITY SERPL CALC.SUM OF ELEC: 299 MOSM/KG (ref 275–295)
PLATELET # BLD AUTO: 147 10(3)UL (ref 150–450)
POTASSIUM SERPL-SCNC: 4.9 MMOL/L (ref 3.5–5.1)
PROT SERPL-MCNC: 6.8 G/DL (ref 5.7–8.2)
RBC # BLD AUTO: 4.31 X10(6)UL
SODIUM SERPL-SCNC: 143 MMOL/L (ref 136–145)
TRIGL SERPL-MCNC: 83 MG/DL (ref 30–149)
TSI SER-ACNC: 4.49 MIU/ML (ref 0.55–4.78)
VIT D+METAB SERPL-MCNC: 56.2 NG/ML (ref 30–100)
VLDLC SERPL CALC-MCNC: 18 MG/DL (ref 0–30)
WBC # BLD AUTO: 4.4 X10(3) UL (ref 4–11)

## 2024-08-13 PROCEDURE — 80053 COMPREHEN METABOLIC PANEL: CPT

## 2024-08-13 PROCEDURE — 77067 SCR MAMMO BI INCL CAD: CPT | Performed by: FAMILY MEDICINE

## 2024-08-13 PROCEDURE — 80061 LIPID PANEL: CPT

## 2024-08-13 PROCEDURE — 82570 ASSAY OF URINE CREATININE: CPT

## 2024-08-13 PROCEDURE — 36415 COLL VENOUS BLD VENIPUNCTURE: CPT

## 2024-08-13 PROCEDURE — 85025 COMPLETE CBC W/AUTO DIFF WBC: CPT

## 2024-08-13 PROCEDURE — 82043 UR ALBUMIN QUANTITATIVE: CPT

## 2024-08-13 PROCEDURE — 84443 ASSAY THYROID STIM HORMONE: CPT

## 2024-08-13 PROCEDURE — 77063 BREAST TOMOSYNTHESIS BI: CPT | Performed by: FAMILY MEDICINE

## 2024-08-13 PROCEDURE — 82306 VITAMIN D 25 HYDROXY: CPT

## 2024-08-29 ENCOUNTER — OFFICE VISIT (OUTPATIENT)
Dept: FAMILY MEDICINE CLINIC | Facility: CLINIC | Age: 84
End: 2024-08-29
Payer: MEDICARE

## 2024-08-29 VITALS
RESPIRATION RATE: 16 BRPM | SYSTOLIC BLOOD PRESSURE: 90 MMHG | BODY MASS INDEX: 27.92 KG/M2 | HEIGHT: 60.5 IN | DIASTOLIC BLOOD PRESSURE: 50 MMHG | WEIGHT: 146 LBS | HEART RATE: 72 BPM

## 2024-08-29 DIAGNOSIS — I73.9 PVD (PERIPHERAL VASCULAR DISEASE) (HCC): ICD-10-CM

## 2024-08-29 DIAGNOSIS — M85.89 OSTEOPENIA OF MULTIPLE SITES: ICD-10-CM

## 2024-08-29 DIAGNOSIS — C54.1 ENDOMETRIAL CANCER (HCC): ICD-10-CM

## 2024-08-29 DIAGNOSIS — I50.22 CHRONIC SYSTOLIC CHF (CONGESTIVE HEART FAILURE) (HCC): ICD-10-CM

## 2024-08-29 DIAGNOSIS — Z78.0 POSTMENOPAUSAL: ICD-10-CM

## 2024-08-29 DIAGNOSIS — D69.6 THROMBOCYTOPENIA (HCC): ICD-10-CM

## 2024-08-29 DIAGNOSIS — R73.03 PREDIABETES: ICD-10-CM

## 2024-08-29 DIAGNOSIS — I27.20 PULMONARY HYPERTENSION (HCC): ICD-10-CM

## 2024-08-29 DIAGNOSIS — I42.0 DILATED CARDIOMYOPATHY (HCC): ICD-10-CM

## 2024-08-29 DIAGNOSIS — I48.0 PAROXYSMAL ATRIAL FIBRILLATION (HCC): ICD-10-CM

## 2024-08-29 DIAGNOSIS — I65.23 BILATERAL CAROTID ARTERY STENOSIS: ICD-10-CM

## 2024-08-29 DIAGNOSIS — N18.31 STAGE 3A CHRONIC KIDNEY DISEASE (CKD) (HCC): ICD-10-CM

## 2024-08-29 DIAGNOSIS — Z00.00 ENCOUNTER FOR ANNUAL HEALTH EXAMINATION: Primary | ICD-10-CM
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
## 2024-08-29 DIAGNOSIS — F41.1 GAD (GENERALIZED ANXIETY DISORDER): ICD-10-CM

## 2024-08-29 PROBLEM — I34.0 MODERATE MITRAL REGURGITATION: Status: ACTIVE | Noted: 2023-04-04

## 2024-08-29 PROBLEM — F41.9 ANXIETY AND DEPRESSION: Status: ACTIVE | Noted: 2023-06-15

## 2024-08-29 PROBLEM — I07.1 MODERATE TRICUSPID REGURGITATION: Status: ACTIVE | Noted: 2023-04-04

## 2024-08-29 PROBLEM — F32.A ANXIETY AND DEPRESSION: Status: ACTIVE | Noted: 2023-06-15

## 2024-08-29 PROCEDURE — 99213 OFFICE O/P EST LOW 20 MIN: CPT | Performed by: FAMILY MEDICINE

## 2024-08-29 PROCEDURE — G0439 PPPS, SUBSEQ VISIT: HCPCS | Performed by: FAMILY MEDICINE

## 2024-08-29 RX ORDER — EZETIMIBE 10 MG/1
TABLET ORAL
COMMUNITY
Start: 2024-08-27

## 2024-08-29 RX ORDER — PREDNISONE 10 MG/1
10 TABLET ORAL DAILY
Qty: 5 TABLET | Refills: 0 | Status: SHIPPED | OUTPATIENT
Start: 2024-08-29

## 2024-08-29 NOTE — PROGRESS NOTES
Subjective:   Sol Dee is a 84 year old female who presents for a Subsequent Annual Wellness visit (Pt already had Initial Annual Wellness) and scheduled follow up of multiple significant but stable problems.     Last visit presented for hospital follow-up for weakness which was determined to be due to chemotherapy.  At that time noted to have increased anxiety.  We increased fluoxetine from 20 to 40 mg to compensate.     Since last visit, patient had hypotension and has been following up with cardiology.  Her meds have been adjusted.  This is improved.  This did inhibit her ability to complete physical therapy.  Home health and physical therapy have been extended.  Notes she feels she is getting stronger, weakness has decreased and energy levels have improved some.     Also notes anxiety is significantly improved from previous.  Thinks she will be able to decrease her fluoxetine back to 20 mg in the next couple months.     Bloating present still but improving.     PMHx:  CAD s/p CABG and multiple (20) stents, CHF.: Has pace maker, brenda.  Follows with Dr. Longoria, Dr. Arciniega and Dr. Robbins.   Per cardiology:  1. Continue current cardiovascular medications as scheduled:  -Spironolactone 12.5 mg p.o. daily  -Furosemide 20 mg p.o. twice a week  -Metoprolol succinate 25 mg p.o. daily  -Low-dose of Entresto twice a day  2. Patient was instructed to skip Entresto/hold Entresto if systolic blood pressures less than 100 mmHg and she is dizzy.  3. Cholesterol profile is grossly abnormal on Repatha. Recommend to add Zetia 10 mg p.o. daily and will send prescription to pharmacy for Zetia 30 days 6 refills.  4. OptiVol is borderline and I would not push diuretics with dizziness and she is not fluid overloaded by physical exam. I think we should not increase any furosemide at this at present time. It will make dizzy more and drier looking in the whole picture having patient in front of me.  5. No need for additional  cardiac testing now. EF recovered from 20s percent to 45 to 50% after medication adjustment.  6. Follow-up with Dr. Longoria as scheduled.  7. Follow-up with Dr. Robbins and device checking as scheduled. Patient has A-fib treated with beta-blocker on Eliquis.   8. Follow-up with oncology at [facility] as scheduled.  9. Follow-up with me December 2024.  10. Cholesterol profile with CMP in 3 months since we adding Zetia today.All was discussed with the patient.  Copy to PCP    Endometrial cancer- hysterectomy just after Christmas 2023. Had 3 sessions of chemo. This last sesion wiped her out completely. Will be starting radiation for 4 sessions starting next week. Being done at [facility].      Had treatment for hepatitis C (autoimmune hepatitis and PBC on list but patient does not recall this)- was unable to finish treatment due to side effects    Health Maintenance:  Vaccines: reviewed as below. Indicated today: shingrix  Immunization History   Administered Date(s) Administered    Covid-19 Vaccine Moderna 100 mcg/0.5 ml 01/28/2021, 02/25/2021, 08/27/2021    FLUZONE 6 months and older PFS 0.5 ml (77362) 10/15/2014    Fluzone Vaccine Medicare () 10/01/2017, 10/15/2018    Influenza 11/03/2010, 10/29/2012, 10/12/2015, 09/20/2016, 11/10/2019, 09/10/2020, 10/10/2021, 11/09/2022, 12/01/2023    Pneumococcal (Prevnar 13) 01/07/2016    Pneumovax 23 01/01/2005, 07/31/2017    TDAP 11/13/2013    Zoster Vaccine Live (Zostavax) 01/27/2015   Deferred Date(s) Deferred    FLUAD High Dose 65 yr and older (54438) 11/02/2017     Obesity screening: Body mass index is 28.04 kg/m².  Diabetes screening:  due  Hypercholesterolemia screening:  Lab Results   Component Value Date    HDL 62 (H) 08/13/2024    HDL 69 (H) 03/28/2023    HDL 71 (H) 02/13/2023     Lab Results   Component Value Date     (H) 08/13/2024     (H) 03/28/2023     (H) 02/13/2023     Lab Results   Component Value Date    TRIG 83 08/13/2024    TRIG 79 03/28/2023     TRIG 71 02/13/2023        Depression screen:   Depression Screening (PHQ-2/PHQ-9): Over the LAST 2 WEEKS   Little interest or pleasure in doing things: Several days    Feeling down, depressed, or hopeless: Not at all    PHQ-2 SCORE: 1      Cervical Cancer screening: n/a  Colon Cancer screening: Last screening: n/a  Breast Cancer screening: Last mammogram: 8/2024    History/Other:   Fall Risk Assessment:   She has been screened for Falls and is High Risk. Fall Prevention information provided to patient in After Visit Summary.    Do you feel unsteady when standing or walking?: Yes  Do you worry about falling?: Yes  Have you fallen in the past year?: Yes  How many times have you fallen?: (P) 2  Were you injured?: (P) No     Cognitive Assessment:   Abnormal  What day of the week is this?: Correct  What month is it?: Correct  What year is it?: Correct  Recall \"Ball\": Correct  Recall \"Flag\": Incorrect  Recall \"Tree\": Correct    Functional Ability/Status:   Sol Dee has some abnormal functions as listed below:  She has difficulties Affording Meds based on screening of functional status. She has Hearing problems based on screening of functional status.She has Vision problems based on screening of functional status. She has Walking problems based on screening of functional status.       Depression Screening (PHQ):  PHQ-9 TOTAL SCORE: 5  , done 8/29/2024   Little interest or pleasure in doing things: 1    Trouble falling or staying asleep, or sleeping too much: 1     Feeling tired or having little energy: 2    Feeling bad about yourself - or that you are a failure or have let yourself or your family down: 1    If you checked off any problems, how difficult have these problems made it for you to do your work, take care of things at home, or get along with other people?: Somewhat difficult            Advanced Directives:   She does NOT have a Living Will. [Do you have a living will?: Yes]    She does NOT have a  Power of  for Health Care. [Do you have a healthcare power of ?: Yes]    Discussed Advance Care Planning with patient (and family/surrogate if present). Standard forms made available to patient in After Visit Summary.      Patient Active Problem List   Diagnosis    Osteopenia    Anemia    Autoimmune hepatitis (HCC)    Thrombocytopenia (HCC)    Subclavian steal syndrome    Stage 3 chronic kidney disease (HCC)    AICD (automatic cardioverter/defibrillator) present    Essential hypertension    ASHD (arteriosclerotic heart disease)    PVD (peripheral vascular disease) (HCC)    Dilated cardiomyopathy (HCC)    Prediabetes    Vitamin D deficiency    S/P angioplasty with stent    Carotid artery occlusion    Hx of CABG    Bilateral carotid artery disease (HCC)    Chronic total occlusion of coronary artery    Pure hypercholesterolemia    Closed displaced fracture of greater tuberosity of right humerus with routine healing, subsequent encounter (global thru 8/20/19)    CAD (coronary artery disease)    Abnormal EKG    YOUNG (dyspnea on exertion)    NSTEMI (non-ST elevated myocardial infarction) (HCC)    Dyspnea, unspecified type    Dyspnea    Community acquired pneumonia    Dehydration    Diarrhea    Antineoplastic chemotherapy induced pancytopenia (CODE) (HCC)    Paroxysmal atrial fibrillation (HCC)    Endometrial cancer (HCC)    Pancytopenia (HCC)    Uterine carcinosarcoma (HCC)    Anxiety and depression    Chronic systolic CHF (congestive heart failure) (HCC)    Moderate mitral regurgitation    Moderate tricuspid regurgitation    Pulmonary hypertension (HCC)     Allergies:  She is allergic to statins and valium [diazepam].    Current Medications:  Outpatient Medications Marked as Taking for the 8/29/24 encounter (Office Visit) with Lanny Ba DO   Medication Sig    ezetimibe 10 MG Oral Tab     predniSONE 10 MG Oral Tab Take 1 tablet (10 mg total) by mouth daily.    FLUOXETINE HCL 40 MG Oral Cap Take 1  capsule (40 mg total) by mouth every morning.    REPATHA SURECLICK 140 MG/ML Subcutaneous Solution Auto-injector INJECT CONTENTS OF PEN ONCE EVERY 14 DAYS    spironolactone 25 MG Oral Tab 0.5 tablets (12.5 mg total).    furosemide 20 MG Oral Tab Take 1 tablet (20 mg total) by mouth twice a week.    aspirin 81 MG Oral Tab EC Take 1 tablet (81 mg total) by mouth 3 (three) times a week.    metoprolol succinate ER 50 MG Oral Tablet 24 Hr Take 1 tablet (50 mg total) by mouth nightly. (Patient taking differently: Take 1 tablet (50 mg total) by mouth nightly. Take 1/2 tablet (total 25mg) by mouth nightly.)    apixaban 5 MG Oral Tab Take 0.5 tablets (2.5 mg total) by mouth 2 (two) times daily.    sacubitril-valsartan (ENTRESTO) 24-26 MG Oral Tab Take 1 tablet by mouth 2 (two) times daily. (Patient taking differently: Take 1 tablet by mouth 2 (two) times daily. Take only if BP systolic is over 100)    Evolocumab 140 MG/ML Subcutaneous Solution Prefilled Syringe Inject 1 mL into the skin every 14 (fourteen) days.    LANCETS MICRO THIN 33G Does not apply Misc 1 each by Does not apply route daily. E11.22    Multiple Vitamins-Iron (MULTIVITAMIN/IRON) Oral Tab Take 1 tablet by mouth daily.    Omega-3 Fatty Acids (FISH OIL) 1000 MG Oral Cap Take 1,000 mg by mouth daily.    ascorbic acid (VITAMIN C) 500 MG Oral Tab Take 1 tablet (500 mg total) by mouth daily.    Cholecalciferol (VITAMIN D) 1000 UNITS Oral Cap Take 1,000 Units by mouth daily.         Medical History:  She  has a past medical history of Acute hepatitis C without mention of hepatic coma(070.51), AICD (automatic cardioverter/defibrillator) present (06/2013), Allergic rhinitis, Anemia, Anxiety, ASHD (arteriosclerotic heart disease), Atherosclerosis of coronary artery, Autoimmune hepatitis (HCC), Cancer (HCC), Cardiomyopathy (HCC), CKD (chronic kidney disease), Congestive heart disease (HCC), Coronary atherosclerosis of unspecified type of vessel, native or graft, CVD  (cerebrovascular disease), Depression, Elevated LFTs, Essential hypertension (?), Glucose intolerance (pre-diabetes), Heart attack (HCC), Heart disease (?6223-9002), High blood pressure, High cholesterol, History of right shoulder fracture (), Hyperlipemia, Hyperlipidemia, Hypertension, Incontinence, Osteopenia, Positive VALARIE (antinuclear antibody), Primary biliary cirrhosis (HCC), PVD (peripheral vascular disease) (HCC), Shortness of breath, Stage 3 chronic kidney disease (HCC), Subclavian steal syndrome, Thrombocytopenia (HCC), Type 2 diabetes mellitus with diabetic chronic kidney disease (HCC) (2016), Type 2 diabetes mellitus with stage 3 chronic kidney disease, without long-term current use of insulin (HCC), Type II or unspecified type diabetes mellitus without mention of complication, not stated as uncontrolled (aka 87957), Viral hepatitis C (??), and Visual impairment.  Surgical History:  She  has a past surgical history that includes cholecystectomy; open heart surgery (); orthopedic surg (); tonsillectomy; Cardiac defibrillator placement; cabg (); cath percutaneous  transluminal coronary angioplasty; arthroscopy of joint unlisted; other surgical history (Right, 2016); angiogram (1996); angiogram (1996); angiogram (1997); angiogram (2007); angiogram (2010); angiogram (2011); angiogram (2013); angiogram (2016); angioplasty (coronary) (2017); angioplasty (coronary) (2017); angioplasty (coronary) (2016); angioplasty (coronary) (2013); angioplasty (coronary) (2013); angioplasty (coronary) (2011); angioplasty (coronary) (2011); carpal tunnel release (??); knee arthroscopy (?); cath drug eluting stent; Cardiac pacemaker placement; colonoscopy; appendectomy; cataract; colonoscopy; hysterectomy; ; skin surgery; and oophorectomy.   Family History:  Her family history includes Arthritis in her mother;  Asthma in her son; Breast Cancer (age of onset: 50) in her sister; Breast Cancer (age of onset: 80) in her mother; Cancer in her mother and sister; Diabetes in her father; Heart Disease in her brother, brother, father, and son; Heart Disorder in her brother, brother, father, and mother; Hypertension in her daughter and son; Uterine Cancer (age of onset: 83) in her self.  Social History:  She  reports that she has never smoked. She has never used smokeless tobacco. She reports that she does not drink alcohol and does not use drugs.    Tobacco:  She has never smoked tobacco.    CAGE Alcohol Screen:   CAGE screening score of 0 on 8/23/2024, showing low risk of alcohol abuse.      Patient Care Team:  Lanny Ba DO as PCP - General (Family Medicine)  Nicki Evangelista MD (GASTROENTEROLOGY)  Elia Sr MD (Cardiovascular Diseases)  Shay Rankin (OPHTHALMOLOGY)    Review of Systems     Negative except as above    Objective:   Physical Exam  General Appearance:  Alert, cooperative, no distress, appears stated age   Head:  Normocephalic, without obvious abnormality, atraumatic   Eyes:  conjunctiva/corneas clear, EOM's intact both eyes   Neck: Supple, symmetrical, trachea midline, no adenopathy;  thyroid: not enlarged, symmetric, no tenderness/mass/nodules; no carotid bruit or JVD   Lungs:   Clear to auscultation bilaterally, respirations unlabored   Heart:  Regular rate and rhythm, S1 and S2 normal, no murmur, rub, or gallop   Abdomen:   Soft, non-tender, bowel sounds active all four quadrants,  no masses, no organomegaly   Pelvic: Deferred   Extremities: Extremities normal, atraumatic, no cyanosis or edema   Neurologic: Grossly Normal         BP 90/50 (BP Location: Left arm)   Pulse 72   Resp 16   Ht 5' 0.5\" (1.537 m)   Wt 146 lb (66.2 kg)   BMI 28.04 kg/m²  Estimated body mass index is 28.04 kg/m² as calculated from the following:    Height as of this encounter: 5' 0.5\" (1.537 m).    Weight as of this  encounter: 146 lb (66.2 kg).    Medicare Hearing Assessment:   Hearing Screening    Time taken: 8/29/2024 10:43 AM  Screening Method: Whisper Test  Whisper Test Result: Pass         Visual Acuity:   Right Eye Visual Acuity: Corrected Right Eye Chart Acuity: 20/40   Left Eye Visual Acuity: Corrected Left Eye Chart Acuity: 20/40   Both Eyes Visual Acuity: Corrected Both Eyes Chart Acuity: 20/40   Able To Tolerate Visual Acuity: Yes        Assessment & Plan:   Sol Dee is a 84 year old female who presents for a Medicare Assessment.     1. Encounter for annual health examination (Primary)  2. Prediabetes  -    Stable.  Will check Hemoglobin A1C; Future; Expected date: 08/29/2024  3. Osteopenia of multiple sites  4. Postmenopausal  -     XR DEXA BONE DENSITOMETRY (CPT=77080); Future; Expected date: 08/29/2024  5. Bug bite, initial encounter  -    Unclear etiology.  Given significant pruritus, will treat with predniSONE; Take 1 tablet (10 mg total) by mouth daily.  Dispense: 5 tablet; Refill: 0  6. Stage 3a chronic kidney disease (CKD) (HCC)  BMP every 3 to 6 months.  Stable.  7. Thrombocytopenia (HCC)  Stable.  Monitor with CBC every 6 months  8. PVD (peripheral vascular disease) (HCC)  Medically optimized.  Continue current medications.  Continue following with cardiology.  9. Dilated cardiomyopathy (HCC)  Stable.  Continue current medications and following with cardiology.  10. Bilateral carotid artery stenosis  Medically optimized.  Continue following with cardiology  11. Paroxysmal atrial fibrillation (HCC)  Medically optimized with beta-blocker and anticoagulated with Eliquis.  Continue following with cardiology  12. Endometrial cancer (HCC)  Completed chemo and radiation.  Defer to oncology for follow-up  13. Chronic systolic CHF (congestive heart failure) (HCC)  Improved.  Continue current medications.  14. Pulmonary hypertension (HCC)  Stable.  Continue current medications.  Repeat ultrasound in 1  year.  15. ADITYA (generalized anxiety disorder)  To drop to fluoxetine 20 mg nce patient feels she is ready.    The patient indicates understanding of these issues and agrees to the plan.  Continue with current treatment plan.  Reinforced healthy diet, lifestyle, and exercise.  RTC in [address] [place] [address],.     Lanny Ba DO, 8/29/2024     Supplementary Documentation:   General Health:  In the past six months, have you lost more than 10 pounds without trying?: 1 - Yes  Has your appetite been poor?: Yes  Type of Diet: Balanced  How does the patient maintain a good energy level?: Other  How would you describe your daily physical activity?: Light  How would you describe your current health state?: Fair  How do you maintain positive mental well-being?: Social Interaction;Visiting Friends  On a scale of 0 to 10, with 0 being no pain and 10 being severe pain, what is your pain level?: 0 - (None)  In the past six months, have you experienced urine leakage?: 1-Yes  At any time do you feel concerned for the safety/well-being of yourself and/or your children, in your home or elsewhere?: No  Have you had any immunizations at another office such as Influenza, Hepatitis B, Tetanus, or Pneumococcal?: Yes    Health Maintenance   Topic Date Due    Annual Physical  Never done    Zoster Vaccines (1 of 2) 03/24/2015    Diabetes Care Foot Exam  02/06/2019    Diabetes Care Dilated Eye Exam  07/29/2021    COVID-19 Vaccine (4 - 2023-24 season) 09/01/2023    Diabetes Care A1C  09/26/2024    Influenza Vaccine (1) 10/01/2024    Diabetes Care: GFR  08/13/2025    Diabetes Care: Microalb/Creat Ratio  08/13/2025    Mammogram  08/13/2025    DEXA Scan  Completed    Annual Depression Screening  Completed    Fall Risk Screening (Annual)  Completed    Pneumococcal Vaccine: 65+ Years  Completed

## 2024-10-14 DIAGNOSIS — F41.9 ANXIETY: ICD-10-CM

## 2024-10-17 ENCOUNTER — TELEPHONE (OUTPATIENT)
Dept: FAMILY MEDICINE CLINIC | Facility: CLINIC | Age: 84
End: 2024-10-17

## 2024-10-17 NOTE — TELEPHONE ENCOUNTER
Refill request for:    Requested Prescriptions     Pending Prescriptions Disp Refills    FLUOXETINE HCL 40 MG Oral Cap [Pharmacy Med Name: Fluoxetine Hydrochloride 40 Mg Cap Nort] 90 capsule 0     Sig: Take 1 capsule (40 mg total) by mouth every morning.        Last Prescribed Quantity Refills   7/11/24 90 0     LOV 8/29/2024     Patient was asked to follow-up in: Follow-up not documented in note    Appointment scheduled: 2/28/2025 Lanny Ba DO    Medication not on protocol.     # 90 with 0 refills routed to Lanny Ba DO for review

## 2024-10-17 NOTE — TELEPHONE ENCOUNTER
Pt is calling patient was told by insurance that her mammogram was rejected by insurance because it was too soon. She is asking Dr. Ba to write a letter and put it in her My chart stating she ordered it as a new provider because we had no record of a recent mammogram. Please advise.

## 2024-10-19 RX ORDER — FLUOXETINE 40 MG/1
40 CAPSULE ORAL EVERY MORNING
Qty: 90 CAPSULE | Refills: 0 | Status: SHIPPED | OUTPATIENT
Start: 2024-10-19

## 2024-12-05 ENCOUNTER — LAB ENCOUNTER (OUTPATIENT)
Dept: LAB | Age: 84
End: 2024-12-05
Attending: INTERNAL MEDICINE
Payer: MEDICARE

## 2024-12-05 DIAGNOSIS — R73.03 PREDIABETES: ICD-10-CM

## 2024-12-05 DIAGNOSIS — I50.22 ACC/AHA STAGE C CHRONIC SYSTOLIC HEART FAILURE (HCC): Primary | ICD-10-CM

## 2024-12-05 LAB
ALBUMIN SERPL-MCNC: 4.2 G/DL (ref 3.2–4.8)
ALBUMIN/GLOB SERPL: 1.6 {RATIO} (ref 1–2)
ALP LIVER SERPL-CCNC: 71 U/L
ALT SERPL-CCNC: 24 U/L
ANION GAP SERPL CALC-SCNC: 7 MMOL/L (ref 0–18)
AST SERPL-CCNC: 27 U/L (ref ?–34)
BILIRUB SERPL-MCNC: 0.8 MG/DL (ref 0.2–1.1)
BUN BLD-MCNC: 42 MG/DL (ref 9–23)
BUN/CREAT SERPL: 30 (ref 10–20)
CALCIUM BLD-MCNC: 9.9 MG/DL (ref 8.7–10.4)
CHLORIDE SERPL-SCNC: 106 MMOL/L (ref 98–112)
CHOLEST SERPL-MCNC: 217 MG/DL (ref ?–200)
CO2 SERPL-SCNC: 29 MMOL/L (ref 21–32)
CREAT BLD-MCNC: 1.4 MG/DL
EGFRCR SERPLBLD CKD-EPI 2021: 37 ML/MIN/1.73M2 (ref 60–?)
EST. AVERAGE GLUCOSE BLD GHB EST-MCNC: 131 MG/DL (ref 68–126)
FASTING PATIENT LIPID ANSWER: YES
FASTING STATUS PATIENT QL REPORTED: YES
GLOBULIN PLAS-MCNC: 2.6 G/DL (ref 2–3.5)
GLUCOSE BLD-MCNC: 115 MG/DL (ref 70–99)
HBA1C MFR BLD: 6.2 % (ref ?–5.7)
HDLC SERPL-MCNC: 55 MG/DL (ref 40–59)
LDLC SERPL CALC-MCNC: 151 MG/DL (ref ?–100)
NONHDLC SERPL-MCNC: 162 MG/DL (ref ?–130)
OSMOLALITY SERPL CALC.SUM OF ELEC: 305 MOSM/KG (ref 275–295)
POTASSIUM SERPL-SCNC: 4.2 MMOL/L (ref 3.5–5.1)
PROT SERPL-MCNC: 6.8 G/DL (ref 5.7–8.2)
SODIUM SERPL-SCNC: 142 MMOL/L (ref 136–145)
TRIGL SERPL-MCNC: 65 MG/DL (ref 30–149)
VLDLC SERPL CALC-MCNC: 12 MG/DL (ref 0–30)

## 2024-12-05 PROCEDURE — 36415 COLL VENOUS BLD VENIPUNCTURE: CPT

## 2024-12-05 PROCEDURE — 80053 COMPREHEN METABOLIC PANEL: CPT

## 2024-12-05 PROCEDURE — 83036 HEMOGLOBIN GLYCOSYLATED A1C: CPT

## 2024-12-05 PROCEDURE — 80061 LIPID PANEL: CPT

## 2025-01-10 ENCOUNTER — TELEPHONE (OUTPATIENT)
Dept: FAMILY MEDICINE CLINIC | Facility: CLINIC | Age: 85
End: 2025-01-10

## 2025-01-10 ENCOUNTER — LAB ENCOUNTER (OUTPATIENT)
Dept: LAB | Facility: REFERENCE LAB | Age: 85
End: 2025-01-10
Attending: FAMILY MEDICINE
Payer: MEDICARE

## 2025-01-10 DIAGNOSIS — I50.22 CHRONIC SYSTOLIC CHF (CONGESTIVE HEART FAILURE) (HCC): ICD-10-CM

## 2025-01-10 DIAGNOSIS — I50.22 CHRONIC SYSTOLIC CHF (CONGESTIVE HEART FAILURE) (HCC): Primary | ICD-10-CM

## 2025-01-10 LAB
ALBUMIN SERPL-MCNC: 4.2 G/DL (ref 3.2–4.8)
ALBUMIN/GLOB SERPL: 1.6 {RATIO} (ref 1–2)
ALP LIVER SERPL-CCNC: 83 U/L
ALT SERPL-CCNC: 25 U/L
ANION GAP SERPL CALC-SCNC: 8 MMOL/L (ref 0–18)
AST SERPL-CCNC: 26 U/L (ref ?–34)
BILIRUB SERPL-MCNC: 0.9 MG/DL (ref 0.2–1.1)
BUN BLD-MCNC: 36 MG/DL (ref 9–23)
BUN/CREAT SERPL: 28.3 (ref 10–20)
CALCIUM BLD-MCNC: 10.2 MG/DL (ref 8.7–10.4)
CHLORIDE SERPL-SCNC: 102 MMOL/L (ref 98–112)
CO2 SERPL-SCNC: 30 MMOL/L (ref 21–32)
CREAT BLD-MCNC: 1.27 MG/DL
EGFRCR SERPLBLD CKD-EPI 2021: 42 ML/MIN/1.73M2 (ref 60–?)
FASTING STATUS PATIENT QL REPORTED: YES
GLOBULIN PLAS-MCNC: 2.7 G/DL (ref 2–3.5)
GLUCOSE BLD-MCNC: 122 MG/DL (ref 70–99)
OSMOLALITY SERPL CALC.SUM OF ELEC: 300 MOSM/KG (ref 275–295)
POTASSIUM SERPL-SCNC: 4.8 MMOL/L (ref 3.5–5.1)
PROT SERPL-MCNC: 6.9 G/DL (ref 5.7–8.2)
SODIUM SERPL-SCNC: 140 MMOL/L (ref 136–145)

## 2025-01-10 PROCEDURE — 36415 COLL VENOUS BLD VENIPUNCTURE: CPT

## 2025-01-10 PROCEDURE — 80053 COMPREHEN METABOLIC PANEL: CPT

## 2025-01-17 ENCOUNTER — HOSPITAL ENCOUNTER (OUTPATIENT)
Dept: BONE DENSITY | Age: 85
Discharge: HOME | End: 2025-01-17
Attending: FAMILY MEDICINE
Payer: MEDICARE

## 2025-01-17 DIAGNOSIS — M85.89 OSTEOPENIA OF MULTIPLE SITES: ICD-10-CM

## 2025-01-17 DIAGNOSIS — Z78.0 POSTMENOPAUSAL: ICD-10-CM

## 2025-01-17 PROCEDURE — 77080 DXA BONE DENSITY AXIAL: CPT | Performed by: FAMILY MEDICINE

## 2025-01-24 DIAGNOSIS — F41.9 ANXIETY: ICD-10-CM

## 2025-01-25 RX ORDER — FLUOXETINE 40 MG/1
40 CAPSULE ORAL EVERY MORNING
Qty: 90 CAPSULE | Refills: 0 | Status: SHIPPED | OUTPATIENT
Start: 2025-01-25

## 2025-01-25 NOTE — TELEPHONE ENCOUNTER
Requested Prescriptions     Signed Prescriptions Disp Refills    FLUOXETINE HCL 40 MG Oral Cap 90 capsule 0     Sig: TAKE 1 CAPSULE BY MOUTH EVERY MORNING     Authorizing Provider: KATHLEEN MATT     Ordering User: SERJIO JACKSON      Refilled per protocol/OV notes

## 2025-02-28 ENCOUNTER — OFFICE VISIT (OUTPATIENT)
Dept: FAMILY MEDICINE CLINIC | Facility: CLINIC | Age: 85
End: 2025-02-28
Payer: MEDICARE

## 2025-02-28 VITALS
TEMPERATURE: 97 F | HEART RATE: 88 BPM | OXYGEN SATURATION: 97 % | WEIGHT: 150.63 LBS | SYSTOLIC BLOOD PRESSURE: 118 MMHG | BODY MASS INDEX: 29 KG/M2 | RESPIRATION RATE: 16 BRPM | DIASTOLIC BLOOD PRESSURE: 62 MMHG

## 2025-02-28 DIAGNOSIS — N18.32 TYPE 2 DIABETES MELLITUS WITH STAGE 3B CHRONIC KIDNEY DISEASE, WITHOUT LONG-TERM CURRENT USE OF INSULIN (HCC): Primary | ICD-10-CM

## 2025-02-28 DIAGNOSIS — E11.22 TYPE 2 DIABETES MELLITUS WITH STAGE 3B CHRONIC KIDNEY DISEASE, WITHOUT LONG-TERM CURRENT USE OF INSULIN (HCC): Primary | ICD-10-CM

## 2025-02-28 DIAGNOSIS — F41.9 ANXIETY: ICD-10-CM

## 2025-02-28 DIAGNOSIS — N18.32 STAGE 3B CHRONIC KIDNEY DISEASE (HCC): ICD-10-CM

## 2025-02-28 DIAGNOSIS — R25.2 MUSCLE CRAMPING: ICD-10-CM

## 2025-02-28 DIAGNOSIS — D69.6 THROMBOCYTOPENIA: ICD-10-CM

## 2025-02-28 DIAGNOSIS — I27.20 SEVERE PULMONARY HYPERTENSION (HCC): ICD-10-CM

## 2025-02-28 DIAGNOSIS — I50.22 CHRONIC SYSTOLIC CHF (CONGESTIVE HEART FAILURE) (HCC): ICD-10-CM

## 2025-02-28 DIAGNOSIS — I48.0 PAROXYSMAL ATRIAL FIBRILLATION (HCC): ICD-10-CM

## 2025-02-28 DIAGNOSIS — Z85.42 HX OF MALIGNANT NEOPLASM OF ENDOMETRIUM: ICD-10-CM

## 2025-02-28 PROBLEM — D61.818 PANCYTOPENIA (HCC): Status: RESOLVED | Noted: 2024-03-28 | Resolved: 2025-02-28

## 2025-02-28 PROCEDURE — 99214 OFFICE O/P EST MOD 30 MIN: CPT | Performed by: FAMILY MEDICINE

## 2025-02-28 NOTE — PROGRESS NOTES
Subjective:   Sol Dee is a 84 year old female who presents for Follow - Up (6 months well check)       History/Other:   History of Present Illness  Carolina Dee is a 84 year old female with severe pulmonary hypertension, prediabetes/diabetes, anxiety, endometrial cancer and chronic systolic heart failure who presents for a six-month follow-up.    She has severe pulmonary hypertension,  chronic systolic heart failure. A recent echocardiogram showed an ejection fraction of 50%, with improvements in mitral and tricuspid regurgitation and pulmonary artery pressure. She is currently on furosemide, Entresto metoprolol ezetimibe and spironolactone.     She has chronic kidney disease, currently in stage 3, with recent labs showing stable kidney function. She is on diuretic medications, which may contribute    She is on fluoxetine for anxiety, which has improved. She has experienced recent family stressors, including the passing of her son-in-law and her brother's upcoming surgery for an aneurysm.    She reports muscle cramps in her legs, which are severe at times. She finds relief with quinine water.    She has a history of osteopenia, as shown by a DEXA scan on January 17, 2025. She is not currently taking any specific treatment for it.    She has a history of coronary artery disease, atrial fibrillation, and peripheral vascular disease. She is on Entresto, which was recently expensive but will be more affordable in the future. She also receives Repatha through a yamila.     Chief Complaint Reviewed and Verified  Nursing Notes Reviewed and   Verified  Tobacco Reviewed  Allergies Reviewed  Medications Reviewed    Problem List Reviewed  Medical History Reviewed  Surgical History   Reviewed  OB Status Reviewed  Family History Reviewed         Tobacco:  She has never smoked tobacco.    Current Outpatient Medications   Medication Sig Dispense Refill    FLUOXETINE HCL 40 MG Oral Cap TAKE 1 CAPSULE BY MOUTH  EVERY MORNING 90 capsule 0    ezetimibe 10 MG Oral Tab       REPATHA SURECLICK 140 MG/ML Subcutaneous Solution Auto-injector INJECT CONTENTS OF PEN ONCE EVERY 14 DAYS      spironolactone 25 MG Oral Tab 0.5 tablets (12.5 mg total).      furosemide 20 MG Oral Tab Take 1 tablet (20 mg total) by mouth twice a week.      aspirin 81 MG Oral Tab EC Take 1 tablet (81 mg total) by mouth 3 (three) times a week. 12 tablet 0    metoprolol succinate ER 50 MG Oral Tablet 24 Hr Take 1 tablet (50 mg total) by mouth nightly. (Patient taking differently: Take 1 tablet (50 mg total) by mouth nightly. Take 1/2 tablet (total 25mg) by mouth nightly.) 30 tablet 0    apixaban 5 MG Oral Tab Take 0.5 tablets (2.5 mg total) by mouth 2 (two) times daily.      sacubitril-valsartan (ENTRESTO) 24-26 MG Oral Tab Take 1 tablet by mouth 2 (two) times daily. (Patient taking differently: Take 1 tablet by mouth 2 (two) times daily. Take only if BP systolic is over 100) 60 tablet 3    Evolocumab 140 MG/ML Subcutaneous Solution Prefilled Syringe Inject 1 mL into the skin every 14 (fourteen) days.      LANCETS MICRO THIN 33G Does not apply Misc 1 each by Does not apply route daily. E11.22 100 each 3    Multiple Vitamins-Iron (MULTIVITAMIN/IRON) Oral Tab Take 1 tablet by mouth daily.      Omega-3 Fatty Acids (FISH OIL) 1000 MG Oral Cap Take 1,000 mg by mouth daily.      ascorbic acid (VITAMIN C) 500 MG Oral Tab Take 1 tablet (500 mg total) by mouth daily.      Cholecalciferol (VITAMIN D) 1000 UNITS Oral Cap Take 1,000 Units by mouth daily.             Review of Systems:  Pertinent items are noted in HPI.      Objective:   /62 (BP Location: Right arm)   Pulse 88   Temp 97.1 °F (36.2 °C) (Tympanic)   Resp 16   Wt 150 lb 9.6 oz (68.3 kg)   SpO2 97%   Breastfeeding No   BMI 28.93 kg/m²  Estimated body mass index is 28.93 kg/m² as calculated from the following:    Height as of 8/29/24: 5' 0.5\" (1.537 m).    Weight as of this encounter: 150 lb 9.6  oz (68.3 kg).  Results  LABS  BUN: high (01/2025)  A1c: 6.9% (12/2024)    RADIOLOGY  DEXA scan: osteopenia, major fragility fracture risk 23%, hip fracture risk 6.5% (01/17/2025)    DIAGNOSTIC  Echocardiogram: EF 50%, ventricular fractional shortening 26.7%, mitral regurgitation mild-moderate, tricuspid regurgitation mild, pulmonary artery systolic pressure 30 mmHg (02/07/2025)     Physical Exam  GENERAL: Alert, cooperative, well developed, no acute distress  HEENT: Normocephalic, normal oropharynx, moist mucous membranes  CHEST: Clear to auscultation bilaterally, no wheezes, rhonchi, or crackles  CARDIOVASCULAR: Normal heart rate and rhythm, S1 and S2 normal without murmurs  ABDOMEN: Soft, non-tender, non-distended, without organomegaly, normal bowel sounds  EXTREMITIES: No cyanosis or edema  NEUROLOGICAL: Cranial nerves grossly intact, moves all extremities without gross motor or sensory deficit      Assessment & Plan:   1. Type 2 diabetes mellitus with stage 3b chronic kidney disease, without long-term current use of insulin ([facility]) (Primary)  2. Thrombocytopenia  3. Anxiety  4. Severe pulmonary hypertension ([facility])  5. Paroxysmal atrial fibrillation ([facility])  6. Hx of malignant neoplasm of endometrium  7. Stage 3b chronic kidney disease ([facility])  8. Chronic systolic CHF (congestive heart failure) ([facility])    Assessment & Plan  Pulmonary Hypertension  Severe pulmonary hypertension with recent echocardiogram showing improvement in pulmonary artery pressure from 60 to 30 mmHg, now within normal limits.  - Continue current medications  - Follow up with cardiology next month    Chronic Systolic Heart Failure  Chronic systolic heart failure with an ejection fraction of 50%. Recent echocardiogram shows improvement in mitral and tricuspid regurgitation from moderate to mild.  - Continue current medications  - Follow up with cardiology next month    Atrial Fibrillation  Paroxysmal atrial fibrillation with no recent changes in  symptoms or medications.  - Continue current medications  - Follow up with cardiology next month    Coronary Artery Disease  Coronary artery disease with no recent changes in symptoms or medications.  - Continue current medications  - Follow up with cardiology next month    Peripheral Vascular Disease  Peripheral vascular disease with no recent changes in symptoms or medications.  - Continue current medications  - Follow up with cardiology next month    Chronic Kidney Disease Stage 3  Chronic kidney disease stage 3, likely secondary to diuretic use and age-related decline. Recent labs show stable kidney function with improved BUN levels. Discussed balancing heart and kidney health, prioritizing heart function due to its critical role and limited replacement options.  - Order kidney function tests in 3 months  - Monitor blood counts and A1c in 3 months    Diabetes/Prediabetes  Diet controlled. Recent A1c of 6.2%, improved from 6.9%.  - Monitor A1c in 3 months    Thrombocytopenia  - CBC in 3 months    History of malignant neoplasm of uterus/endometrium  - Continue following with oncology.  Completed chemo.  Desires no further treatment at this time.  No evidence of recurrence    Muscle Cramps  Frequent muscle cramps in legs, possibly related to diuretic use and electrolyte imbalances. Discussed potential magnesium supplementation and advised to consult cardiology for approval.  - Discuss magnesium supplementation with cardiology    Osteopenia  Osteopenia confirmed by DEXA scan. Patient is 85 years old and currently not on any treatment for bone density. Discussed importance of weight-bearing exercises and vitamin D supplementation to reduce fracture risk. Explained risks of hip fractures, including significant health decline and high mortality within a year.  Patient declined treatment at this time  - Encourage weight-bearing exercises  - Consider vitamin D supplementation  - Reassess bone density in 2  years    Anxiety  Anxiety managed with fluoxetine. Patient reports improvement in symptoms. Decision made to continue current dose due to recent family stressors, including brother's upcoming surgery and recent family loss.  - Continue fluoxetine  - Follow up in 6 months    General Health Maintenance  Routine health maintenance discussed, including the importance of regular exercise and monitoring of chronic conditions.  - Encourage regular exercise  - Monitor chronic conditions regularly    Follow-up  - Follow up in 6 months  - Follow up with cardiology next month  - Order kidney function tests, blood counts, and A1c in 3 months.    Recording duration: 24 minutes        Lanny Ba DO, 2/28/2025, 10:10 AM

## 2025-02-28 NOTE — PROGRESS NOTES
The following individual(s) verbally consented to be recorded using ambient AI listening technology and understand that they can each withdraw their consent to this listening technology at any point by asking the clinician to turn off or pause the recording:    Patient name: Sol KILGORE Leila

## 2025-05-22 DIAGNOSIS — F41.9 ANXIETY: ICD-10-CM

## 2025-05-22 RX ORDER — FLUOXETINE HYDROCHLORIDE 40 MG/1
40 CAPSULE ORAL EVERY MORNING
Qty: 90 CAPSULE | Refills: 0 | Status: SHIPPED | OUTPATIENT
Start: 2025-05-22

## 2025-06-17 ENCOUNTER — TELEPHONE (OUTPATIENT)
Dept: FAMILY MEDICINE CLINIC | Facility: CLINIC | Age: 85
End: 2025-06-17

## 2025-06-20 NOTE — TELEPHONE ENCOUNTER
Can we get more details? Is there a reason she wouldn't be able to get a hearing test? Or is this just needing a referral?    Thanks,  Lanny Ba, DO

## 2025-06-23 NOTE — TELEPHONE ENCOUNTER
Spoke w/pt. If she has a letter from the provider stating that she is in need of a hearing test then Ruthie Smith will do it for free. She is asking for letter (in MyChart) that she can print off and give to them. She has not been hearing out of her Left ear like she feels she should be.

## 2025-08-20 DIAGNOSIS — F41.9 ANXIETY: ICD-10-CM

## 2025-08-20 PROBLEM — I21.4 NSTEMI (NON-ST ELEVATED MYOCARDIAL INFARCTION) (HCC): Status: RESOLVED | Noted: 2023-02-13 | Resolved: 2025-08-20

## 2025-08-20 PROBLEM — C55 UTERINE CARCINOSARCOMA (HCC): Status: RESOLVED | Noted: 2024-03-28 | Resolved: 2025-08-20

## 2025-08-20 PROBLEM — C54.1 ENDOMETRIAL CANCER (HCC): Status: RESOLVED | Noted: 2024-03-27 | Resolved: 2025-08-20

## 2025-08-21 ENCOUNTER — LAB ENCOUNTER (OUTPATIENT)
Dept: LAB | Age: 85
End: 2025-08-21
Attending: FAMILY MEDICINE

## 2025-08-21 DIAGNOSIS — R53.83 FATIGUE: ICD-10-CM

## 2025-08-21 DIAGNOSIS — D69.6 THROMBOCYTOPENIA: ICD-10-CM

## 2025-08-21 DIAGNOSIS — N18.32 TYPE 2 DIABETES MELLITUS WITH STAGE 3B CHRONIC KIDNEY DISEASE, WITHOUT LONG-TERM CURRENT USE OF INSULIN (HCC): ICD-10-CM

## 2025-08-21 DIAGNOSIS — R06.09 DOE (DYSPNEA ON EXERTION): Primary | ICD-10-CM

## 2025-08-21 DIAGNOSIS — E11.22 TYPE 2 DIABETES MELLITUS WITH STAGE 3B CHRONIC KIDNEY DISEASE, WITHOUT LONG-TERM CURRENT USE OF INSULIN (HCC): ICD-10-CM

## 2025-08-21 LAB
ANION GAP SERPL CALC-SCNC: 9 MMOL/L (ref 0–18)
BASOPHILS # BLD AUTO: 0.07 X10(3) UL (ref 0–0.2)
BASOPHILS NFR BLD AUTO: 1.6 %
BUN BLD-MCNC: 26 MG/DL (ref 9–23)
BUN/CREAT SERPL: 22.4 (ref 10–20)
CALCIUM BLD-MCNC: 9.6 MG/DL (ref 8.7–10.4)
CHLORIDE SERPL-SCNC: 104 MMOL/L (ref 98–112)
CO2 SERPL-SCNC: 29 MMOL/L (ref 21–32)
CREAT BLD-MCNC: 1.16 MG/DL (ref 0.55–1.02)
DEPRECATED RDW RBC AUTO: 42.6 FL (ref 35.1–46.3)
EGFRCR SERPLBLD CKD-EPI 2021: 46 ML/MIN/1.73M2 (ref 60–?)
EOSINOPHIL # BLD AUTO: 0.21 X10(3) UL (ref 0–0.7)
EOSINOPHIL NFR BLD AUTO: 4.7 %
ERYTHROCYTE [DISTWIDTH] IN BLOOD BY AUTOMATED COUNT: 12.8 % (ref 11–15)
EST. AVERAGE GLUCOSE BLD GHB EST-MCNC: 128 MG/DL (ref 68–126)
FASTING STATUS PATIENT QL REPORTED: YES
GLUCOSE BLD-MCNC: 110 MG/DL (ref 70–99)
HBA1C MFR BLD: 6.1 % (ref ?–5.7)
HCT VFR BLD AUTO: 38.8 % (ref 35–48)
HGB BLD-MCNC: 14 G/DL (ref 12–16)
IMM GRANULOCYTES # BLD AUTO: 0.01 X10(3) UL (ref 0–1)
IMM GRANULOCYTES NFR BLD: 0.2 %
LYMPHOCYTES # BLD AUTO: 1.09 X10(3) UL (ref 1–4)
LYMPHOCYTES NFR BLD AUTO: 24.6 %
MCH RBC QN AUTO: 34 PG (ref 26–34)
MCHC RBC AUTO-ENTMCNC: 36.1 G/DL (ref 31–37)
MCV RBC AUTO: 94.2 FL (ref 80–100)
MONOCYTES # BLD AUTO: 0.46 X10(3) UL (ref 0.1–1)
MONOCYTES NFR BLD AUTO: 10.4 %
NEUTROPHILS # BLD AUTO: 2.59 X10 (3) UL (ref 1.5–7.7)
NEUTROPHILS # BLD AUTO: 2.59 X10(3) UL (ref 1.5–7.7)
NEUTROPHILS NFR BLD AUTO: 58.5 %
OSMOLALITY SERPL CALC.SUM OF ELEC: 299 MOSM/KG (ref 275–295)
PLATELET # BLD AUTO: 145 10(3)UL (ref 150–450)
PLATELETS.RETICULATED NFR BLD AUTO: 4.7 % (ref 0–7)
POTASSIUM SERPL-SCNC: 4.2 MMOL/L (ref 3.5–5.1)
RBC # BLD AUTO: 4.12 X10(6)UL (ref 3.8–5.3)
SODIUM SERPL-SCNC: 142 MMOL/L (ref 136–145)
WBC # BLD AUTO: 4.4 X10(3) UL (ref 4–11)

## 2025-08-21 PROCEDURE — 85025 COMPLETE CBC W/AUTO DIFF WBC: CPT

## 2025-08-21 PROCEDURE — 80048 BASIC METABOLIC PNL TOTAL CA: CPT

## 2025-08-21 PROCEDURE — 83036 HEMOGLOBIN GLYCOSYLATED A1C: CPT

## 2025-08-21 PROCEDURE — 36415 COLL VENOUS BLD VENIPUNCTURE: CPT

## 2025-08-26 RX ORDER — FLUOXETINE HYDROCHLORIDE 40 MG/1
40 CAPSULE ORAL EVERY MORNING
Qty: 90 CAPSULE | Refills: 3 | Status: SHIPPED | OUTPATIENT
Start: 2025-08-26 | End: 2025-08-28

## 2025-08-28 PROBLEM — I50.32 HEART FAILURE WITH IMPROVED EJECTION FRACTION (HFIMPEF) (HCC): Status: ACTIVE | Noted: 2025-04-25

## 2025-08-28 PROCEDURE — 82570 ASSAY OF URINE CREATININE: CPT | Performed by: FAMILY MEDICINE

## 2025-08-28 PROCEDURE — 82043 UR ALBUMIN QUANTITATIVE: CPT | Performed by: FAMILY MEDICINE

## (undated) DIAGNOSIS — Z01.818 PREOP EXAMINATION: Primary | ICD-10-CM

## (undated) NOTE — LETTER
[facility] [address], [address]  Consent for Procedure/Sedation    Date:     Time:       1.  I authorize the performance upon Modesto Black the following:  DUAL CARDIAC DEFIBRILLATOR GENERATOR CHANGE, POSSIBLE LEAD REVISION ________________________________    ___________________    Witness: _________________________      Date: ___________________    Printed: 3/24/2020   11:21 AM  Patient Name: Tamika Hilton        : 3/11/1940       Medical Record #: [id number]

## (undated) NOTE — IP AVS SNAPSHOT
[facility] [place] [address] [place], [address] ~ [phone]                Discharge Summary   4/11/2017    [facility]           Admission Information        Provider Department    4/11/2017 Brock Santo MD  8ne-A Take 20 mg by mouth daily. 9 AM                   Glucose Blood Strp   Commonly known as:  FREESTYLE LITE TEST        Dx. 250.00 Type II diabetes controlled with diet.   Check blood sugars three times a week, alternating am and 2 hours post prandia Call to schedule your cardiac rehab after that and bring your order with you to your first visit. Verify your insurance coverage prior to your first visit.   Call [facility] cardiac rehab at [phone] or [facility] cardiac rehab at [phone]    Discharge R Medications Returned:           Additional Information       We are concerned for your overall well being:    - If you are a smoker or have smoked in the last 12 months, we encourage you to explore options for quitting.     - If you have concerns related t experience these side effects or respond to medications the same. Please call your provider or healthcare team if you have any questions regarding your medications while at home.          Blood Pressure and Cardiac Medications     Sotalol HCl (BETAPACE) 80 Most common side effects: Dizziness, drowsiness, problems with movement   What to report to your healthcare team: Changes in thinking, talking or movement, drowsiness, shaking           All Other Medications     Azelastine HCl 0.05 % Ophthalmic Solution

## (undated) NOTE — LETTER
[facility] [address], [address]  Consent for Procedure/Sedation  Date: 03/28/23         Time: 0845    1. I hereby authorize Dilshad Ramsey, my physician and his/her assistants (if applicable), which may include medical students, residents, and/or fellows, to perform the following surgical operation/ procedure and administer such anesthesia as may be determined necessary by my physician:  Operation/Procedure name (s)  Cardiac Catheterization, Left Ventricular Cineangiography, Bilateral Selective Coronary Angiography and/or Right Heart Catheterization; possible Percutaneous Transluminal Coronary Angioplasty, Coronary Atherectomy, Coronary Stent, Intracoronary Thrombolytic therapy, Antiplatelet therapy and/or Intravascular Ultrasound on [facility] [place]   2. I recognize that during the surgical operation/procedure, unforeseen conditions may necessitate additional or different procedures than those listed above. I, therefore, further authorize and request that the above-named surgeon, assistants, or designees perform such procedures as are, in their judgment, necessary and desirable. 3.   My surgeon/physician has discussed prior to my surgery the potential benefits, risks and side effects of this procedure; the likelihood of achieving goals; and potential problems that might occur during recuperation. They also discussed reasonable alternatives to the procedure, including risks, benefits, and side effects related to the alternatives and risks related to not receiving this procedure. I have had all my questions answered and I acknowledge that no guarantee has been made as to the result that may be obtained. 4.   Should the need arise during my operation/procedure, which includes change of level of care prior to discharge, I also consent to the administration of blood and/or blood products.   Further, I understand that despite careful testing and screening of blood or blood products by collecting agencies, I may still be subject to ill effects as a result of receiving a blood transfusion and/or blood products. The following are some, but not all, of the potential risks that can occur: fever and allergic reactions, hemolytic reactions, transmission of diseases such as Hepatitis, AIDS and Cytomegalovirus (CMV) and fluid overload. In the event that I wish to have an autologous transfusion of my own blood, or a directed donor transfusion, I will discuss this with my physician. Check only if Refusing Blood or Blood Products  I understand refusal of blood or blood products as deemed necessary by my physician may have serious consequences to my condition to include possible death. I hereby assume responsibility for my refusal and release the hospital, its personnel, and my physicians from any responsibility for the consequences of my refusal.          o  Refuse      5. I authorize the use of any specimen, organs, tissues, body parts or foreign objects that may be removed from my body during the operation/procedure for diagnosis, research or teaching purposes and their subsequent disposal by hospital authorities. I also authorize the release of specimen test results and/or written reports to my treating physician on the hospital medical staff or other referring or consulting physicians involved in my care, at the discretion of the Pathologist or my treating physician. 6.   I consent to the photographing or videotaping of the operations or procedures to be performed, including appropriate portions of my body for medical, scientific, or educational purposes, provided my identity is not revealed by the pictures or by descriptive texts accompanying them. If the procedure has been photographed/videotaped, the surgeon will obtain the original picture, image, videotape or CD.   The hospital will not be responsible for storage, release or maintenance of the picture, image, tape or CD.    7.   I consent to the presence of a  or observers in the operating room as deemed necessary by my physician or their designees. 8.   I recognize that in the event my procedure results in extended X-Ray/fluoroscopy time, I may develop a skin reaction. 9. If I have a Do Not Attempt Resuscitation (DNAR) order in place, that status will be suspended while in the operating room, procedural suite, and during the recovery period unless otherwise explicitly stated by me (or a person authorized to consent on my behalf). The surgeon or my attending physician will determine when the applicable recovery period ends for purposes of reinstating the DNAR order. 10. Patients having a sterilization procedure: I understand that if the procedure is successful the results will be permanent and it will therefore be impossible for me to inseminate, conceive, or bear children. I also understand that the procedure is intended to result in sterility, although the result has not been guaranteed. 11. I acknowledge that my physician has explained sedation/analgesia administration to me including the risk and benefits I consent to the administration of sedation/analgesia as may be necessary or desirable in the judgment of my physician.     I CERTIFY THAT I HAVE READ AND FULLY UNDERSTAND THE ABOVE CONSENT TO OPERATION and/or OTHER PROCEDURE.        ____________________________________       _________________________________      ______________________________  Signature of Patient         Signature of Responsible Person        Printed Name of Responsible Person    ____________________________________      _________________________________      ______________________________       Signature of Witness          Relationship to Patient                       Date                                       Time  Patient Name: Era      : 3/11/1940                 Printed: 2023      Medical Record #: [id number] Page 1 of 2

## (undated) NOTE — LETTER
[facility] [address], [address]  Consent for Procedure/Sedation    Date:        Time:       1.  I authorize the performance upon Lanie Winter the following:cardiac catheterization, left ventricular cineangiography, bilateral period, the physician will determine when the applicable recovery period ends for purposes of reinstating the Do Not Resuscitate (DNR) order.     Signature of Patient: ____________________________________________________    Signature of person authorized

## (undated) NOTE — LETTER
Date: 6/23/2025    Patient Name: Sol Dee          To Whom it may concern:    Ms. Dee has reported decreased hearing out of her left ear and I recommend she have her hearing tested so we can determine next steps in treatment.         Sincerely,        Lanny Ba, DO

## (undated) NOTE — LETTER
Pio Longo, MD Dee Dee Salmeron, MD Brennon Villanueva, MD Lanny Ba, DO  Patti Beckman, MD Simon Garcia, YOSELYN Schneider PA-C    Diabetic Eye Exam for Retinopathy  Please fax completed form to [phone]    Patient's Name: Sol Dee    YOB: 1940  Primary Care Physician: Lanny Ba,           Date of Exam: ______/______/_______    Sol Dee received a dilated fundus examination with the following results:    ?     No diabetic retinopathy detected.  ?     Background retinopathy was detected, but only requires monitoring.  ?     Retinopathy requiring further testing and/or treatment was detected. See notes below.      Notes / Commentary / Recommendations:  ______________________________________________________________________________  ______________________________________________________________________________  ______________________________________________________________________________    Sol is to return for follow-up / evaluation in ________ months.    Eye Care Provider Information    Provider Name: _________________________________  Signature: ___________________________     Clinic/Office Name: ___________________________________________________________________   Address: ____________________________________________________________________________  Phone #: _____________________________________   Fax #: ________________________________

## (undated) NOTE — LETTER
[facility] [place] FOR BLOOD OR BLOOD COMPONENTS      In the course of your treatment, it may become necessary to administer a transfusion of blood or blood components. This form provides basic information concerning this procedure and, if signed by you, authorizes its performance by qualified medical personnel. DESCRIPTION OF PROCEDURE:  Blood is introduced into one of your veins, commonly in the arm, using a sterilized disposable needle. The amount of blood transfused, and whether the transfusion will be of blood or blood components is a judgment the physician will make based on your particular needs. RISKS:  The transfusion is a common procedure of low risk. MINOR AND TEMPORARY REACTIONS ARE NOT UNCOMMON, including a slight bruise, swelling or local reaction in the area where the needle pierces your skin, or a non-serious reaction to the transfused material itself, including headache, fever or a mild skin reaction, such as rash. Serious reactions are possible, though very unlikely and include severe allergic reaction (shock)  and destruction (hemolysis) of transfused blood cells. Infectious diseases which are known to be transmitted by blood transfusion include CERTAIN TYPES OF VIRAL HEPATITIS, a viral infection of the liver, HUMAN IMMUNODEFICIENCY VIRUS (HIV-1,2) infection, a viral infection known to cause ACQUIRED IMMUNODEFICIENCY SYNDROME (AIDS) AS WELL AS CERTAIN OTHER BACTERIAL, VIRAL AND PARASITIC DISEASES. While a minimal risk of acquiring an infectious disease from transfused blood exists, in accordance with Federal and State law all due care has been taken in donor selection and testing to avoid transmission of disease. ALTERNATIVES:  If loss of blood poses serious threats in the course of your treatment, THERE IS NO EFFECTIVE ALTERNATIVE TO BLOOD TRANSFUSION.  However, if you have any further questions on this matter, your physician will fully explain the alternatives to you if it has not already been done. I,Sol Dee, have read/had read to me the above. I understand the matters bearing on the decision whether or not to authorize a transfusion of blood or blood components. I have no questions which have not been answered to my full satisfaction.  I hereby consent to such transfusion as  my physician may deem necessary or advisable in the course of my treatment.        _______________   __________________________________________________  Date     Signature of Patient, Parent or Legal Guardian      ([facility])      __________________________________________  Witness to Signature (title or relationship to patient)    Patient Name: Jasper Lam     : 3/11/1940                 Printed: 2023     Medical Record #: [id number]                    Page 1 of 1

## (undated) NOTE — LETTER
Date: 10/21/2024    Patient Name: Sol Dee          To Whom it may concern:    Sol is currently under my care as a new patient. As part of her routine health maintenance, a mammogram was ordered. According to the records I have available to me, her last mammogram was on 3/24/202. For this reason, we ordered a mammogram for updated breast cancer screening. This was completed on 8/13/2024. According to the patient, she received a letter stating the mammogram was completed too soon. Please reconsider coverage of this routine screening test as it was ordered and recommended with the knowledge available at that time.         Sincerely,        Lanny Ba, DO

## (undated) NOTE — LETTER
Date: 10/22/2024    Patient Name: Sol Dee          To Whom it may concern:    Sol is currently under my care as a new patient. As part of her routine health maintenance, a mammogram was ordered. According to the records I have available to me, her last mammogram was on 3/24/2022. For this reason, we ordered a mammogram for updated breast cancer screening. This was completed on 8/13/2024. According to the patient, she received a letter stating the mammogram was completed too soon. Please reconsider coverage of this routine screening test as it was ordered and recommended with the knowledge available at that time.         Sincerely,        Lanny Ba, DO

## (undated) NOTE — LETTER
[facility] [address], [address] [place] [address]  Consent for Procedure/Sedation  Date: 2/13/2023           Time: 2030    1. I hereby authorize Dr. Coleman Valverde, my physician and his/her assistants (if applicable), which may include medical students, residents, and/or fellows, to perform the following surgical operation/ procedure and administer such anesthesia as may be determined necessary by my physician:  Operation/Procedure name (s)  Cardiac Catheterization, Left Ventricular Cineangiography, Bilateral Selective Coronary Angiography and/or Right Heart Catheterization; possible Percutaneous Transluminal Coronary Angioplasty, Coronary Atherectomy, Coronary Stent, Intracoronary Thrombolytic therapy, Antiplatelet therapy and/or Intravascular Ultrasound on Lianet Joshi   2. I recognize that during the surgical operation/procedure, unforeseen conditions may necessitate additional or different procedures than those listed above. I, therefore, further authorize and request that the above-named surgeon, assistants, or designees perform such procedures as are, in their judgment, necessary and desirable. 3.   My surgeon/physician has discussed prior to my surgery the potential benefits, risks and side effects of this procedure; the likelihood of achieving goals; and potential problems that might occur during recuperation. They also discussed reasonable alternatives to the procedure, including risks, benefits, and side effects related to the alternatives and risks related to not receiving this procedure. I have had all my questions answered and I acknowledge that no guarantee has been made as to the result that may be obtained. 4.   Should the need arise during my operation/procedure, which includes change of level of care prior to discharge, I also consent to the administration of blood and/or blood products.   Further, I understand that despite careful testing and screening of blood or blood products by collecting agencies, I may still be subject to ill effects as a result of receiving a blood transfusion and/or blood products. The following are some, but not all, of the potential risks that can occur: fever and allergic reactions, hemolytic reactions, transmission of diseases such as Hepatitis, AIDS and Cytomegalovirus (CMV) and fluid overload. In the event that I wish to have an autologous transfusion of my own blood, or a directed donor transfusion, I will discuss this with my physician. Check only if Refusing Blood or Blood Products  I understand refusal of blood or blood products as deemed necessary by my physician may have serious consequences to my condition to include possible death. I hereby assume responsibility for my refusal and release the hospital, its personnel, and my physicians from any responsibility for the consequences of my refusal.          o  Refuse      5. I authorize the use of any specimen, organs, tissues, body parts or foreign objects that may be removed from my body during the operation/procedure for diagnosis, research or teaching purposes and their subsequent disposal by hospital authorities. I also authorize the release of specimen test results and/or written reports to my treating physician on the hospital medical staff or other referring or consulting physicians involved in my care, at the discretion of the Pathologist or my treating physician. 6.   I consent to the photographing or videotaping of the operations or procedures to be performed, including appropriate portions of my body for medical, scientific, or educational purposes, provided my identity is not revealed by the pictures or by descriptive texts accompanying them. If the procedure has been photographed/videotaped, the surgeon will obtain the original picture, image, videotape or CD.   The hospital will not be responsible for storage, release or maintenance of the picture, image, tape or CD.    7.   I consent to the presence of a  or observers in the operating room as deemed necessary by my physician or their designees. 8.   I recognize that in the event my procedure results in extended X-Ray/fluoroscopy time, I may develop a skin reaction. 9. If I have a Do Not Attempt Resuscitation (DNAR) order in place, that status will be suspended while in the operating room, procedural suite, and during the recovery period unless otherwise explicitly stated by me (or a person authorized to consent on my behalf). The surgeon or my attending physician will determine when the applicable recovery period ends for purposes of reinstating the DNAR order. 10. Patients having a sterilization procedure: I understand that if the procedure is successful the results will be permanent and it will therefore be impossible for me to inseminate, conceive, or bear children. I also understand that the procedure is intended to result in sterility, although the result has not been guaranteed. 11. I acknowledge that my physician has explained sedation/analgesia administration to me including the risk and benefits I consent to the administration of sedation/analgesia as may be necessary or desirable in the judgment of my physician.     I CERTIFY THAT I HAVE READ AND FULLY UNDERSTAND THE ABOVE CONSENT TO OPERATION and/or OTHER PROCEDURE.        ____________________________________       _________________________________      ______________________________  Signature of Patient         Signature of Responsible Person        Printed Name of Responsible Person    ____________________________________      _________________________________      ______________________________       Signature of Witness          Relationship to Patient                       Date                                       Time  Patient Name: Arsenio Dior     : 3/11/1940                 Printed: 2023      Medical Record #: [id number]                      Page 1 of 2

## (undated) NOTE — LETTER
[facility] [address], [address]  Consent for Procedure/Sedation    Date: 9/26/2017    Time: 0715      1.  I authorize the performance upon Christianne Shabazz the following:cardiac catheterization, left ventricular cineangiography, period, the physician will determine when the applicable recovery period ends for purposes of reinstating the Do Not Resuscitate (DNR) order.     Signature of Patient: ____________________________________________________    Signature of person authorized